# Patient Record
Sex: FEMALE | Race: WHITE | NOT HISPANIC OR LATINO | Employment: OTHER | URBAN - METROPOLITAN AREA
[De-identification: names, ages, dates, MRNs, and addresses within clinical notes are randomized per-mention and may not be internally consistent; named-entity substitution may affect disease eponyms.]

---

## 2017-07-10 ENCOUNTER — ALLSCRIPTS OFFICE VISIT (OUTPATIENT)
Dept: OTHER | Facility: OTHER | Age: 82
End: 2017-07-10

## 2017-09-27 ENCOUNTER — GENERIC CONVERSION - ENCOUNTER (OUTPATIENT)
Dept: OTHER | Facility: OTHER | Age: 82
End: 2017-09-27

## 2017-11-20 ENCOUNTER — GENERIC CONVERSION - ENCOUNTER (OUTPATIENT)
Dept: OTHER | Facility: OTHER | Age: 82
End: 2017-11-20

## 2018-01-12 NOTE — MISCELLANEOUS
Message  will change meds to atenolol 25 mg daily and amlodipine 2 5 mg daily  she called with bp 180/90 and not tolerating ziac      Plan  Atrial fibrillation, Benign essential hypertension    · AmLODIPine Besylate 2 5 MG Oral Tablet; TAKE 1 TABLET DAILY  Benign essential hypertension    · Atenolol 25 MG Oral Tablet; TAKE 1 TABLET DAILY    Signatures   Electronically signed by : Last Torres DO;  Apr 17 2016 11:13AM EST                       (Author)

## 2018-01-13 VITALS
HEIGHT: 64 IN | HEART RATE: 64 BPM | RESPIRATION RATE: 16 BRPM | DIASTOLIC BLOOD PRESSURE: 70 MMHG | SYSTOLIC BLOOD PRESSURE: 122 MMHG | WEIGHT: 126.38 LBS | BODY MASS INDEX: 21.57 KG/M2

## 2018-01-13 NOTE — MISCELLANEOUS
Message   Recorded as Task   Date: 01/13/2016 03:33 PM, Created By: Benita Holden   Task Name: Medical Complaint Callback   Assigned To: Paolo Castelan   Regarding Patient: Mima Rosas, Status: Complete   CommentBolivar Monmouth - 13 Jan 2016 3:33 PM     TASK CREATED  Caller: Self; [phone] (Home)  PC from patient stating that she was getting headaches and not feeling well, so she decided to take her BP, which she had not done in quite some time  Her BP was 190/99, which upset her, and she called an ambulance, which took her to [facility] ER  She states they found nothing and released her at 3:00 am   This was on Sunday evening  On Monday she went to PCP/Krystian Coe, and he increased her Atneolol from 12 5 mgs to 25 mgs qd  She has taken it for two days now, and BP came down to 175/85, but she feels this is still unsatifactory  She "does not care for him as a doctor" and wanted to contact you for advisement  She has only had the increase in Atenolol for 3 days, but is taking her BP very often and becoming upset  She is due back with you in April for her yearly appointment  I told her that once she has the 25 mgs in her system for a few more days, it may come down further  Her symptoms have already decreased significantly  Any suggestions or changes on your part? Fareed Valle - 13 Jan 2016 3:42 PM     TASK EDITED  phone call to pt   ML   rec taking extra 12 5 if sbp remains above 575 systolic   should call if problems persist   Fareed Valle - 13 Jan 2016 3:42 PM     TASK COMPLETED   PC from patient stating her BP has returned to normal again  She thanks us for such prompt and good care  Active Problems    1  Atrial fibrillation (427 31) (I48 91)   2  Benign essential hypertension (401 1) (I10)   3  Mitral regurgitation (424 0) (I34 0)   4  Osteoporosis (733 00) (M81 0)   5  TIA (transient ischemic attack) (435 9) (G45 9)    Current Meds   1   Atenolol 25 MG Oral Tablet; one tablet daily as directed; Therapy: 21Apr2015 to (Evaluate:19Oct2016)  Requested for: 25Oct2015; Last   Rx:25Oct2015 Ordered   2  Ecotrin 325 MG Oral Tablet Delayed Release; Take 1 tablet twice daily Recorded   3  Multiple Vitamin Oral Tablet; TAKE 1 TABLET DAILY; Therapy: 21Apr2015 to Recorded   4  Multiple Vitamin Oral Tablet; TAKE 1 TABLET DAILY; Therapy: 21Apr2015 to Recorded    Allergies    1  Iodinated Contrast Media    2  IV Dye   3   Shellfish    Signatures   Electronically signed by : Rocio Shirley, ; Jan 14 2016  1:57PM EST                       (Administrative)

## 2018-01-22 RX ORDER — AMLODIPINE BESYLATE 2.5 MG/1
1 TABLET ORAL 2 TIMES DAILY
COMMUNITY
Start: 2016-04-17 | End: 2018-06-16 | Stop reason: SDUPTHER

## 2018-01-22 RX ORDER — ONDANSETRON 4 MG/1
TABLET, FILM COATED ORAL EVERY 8 HOURS PRN
COMMUNITY
Start: 2017-07-10 | End: 2020-08-17

## 2018-01-22 RX ORDER — HYDROMORPHONE HYDROCHLORIDE 2 MG/1
2 TABLET ORAL EVERY 6 HOURS PRN
COMMUNITY
Start: 2017-07-10 | End: 2021-08-21

## 2018-01-22 RX ORDER — ATENOLOL 25 MG/1
1 TABLET ORAL DAILY
COMMUNITY
Start: 2015-04-21 | End: 2018-03-20 | Stop reason: SDUPTHER

## 2018-01-22 RX ORDER — MULTIVITAMIN WITH IRON
1 TABLET ORAL DAILY
COMMUNITY
Start: 2015-04-21

## 2018-01-24 ENCOUNTER — OFFICE VISIT (OUTPATIENT)
Dept: CARDIOLOGY CLINIC | Facility: CLINIC | Age: 83
End: 2018-01-24
Payer: MEDICARE

## 2018-01-24 VITALS
HEART RATE: 80 BPM | WEIGHT: 133.8 LBS | BODY MASS INDEX: 22.97 KG/M2 | RESPIRATION RATE: 20 BRPM | DIASTOLIC BLOOD PRESSURE: 80 MMHG | SYSTOLIC BLOOD PRESSURE: 130 MMHG

## 2018-01-24 DIAGNOSIS — I34.0 MITRAL VALVE INSUFFICIENCY, ACQUIRED: ICD-10-CM

## 2018-01-24 DIAGNOSIS — I48.91 ATRIAL FIBRILLATION, UNSPECIFIED TYPE (HCC): Primary | ICD-10-CM

## 2018-01-24 DIAGNOSIS — I10 BENIGN ESSENTIAL HYPERTENSION: ICD-10-CM

## 2018-01-24 PROCEDURE — 93000 ELECTROCARDIOGRAM COMPLETE: CPT | Performed by: INTERNAL MEDICINE

## 2018-01-24 PROCEDURE — 99213 OFFICE O/P EST LOW 20 MIN: CPT | Performed by: INTERNAL MEDICINE

## 2018-01-24 NOTE — PROGRESS NOTES
History:  28-year-old with mild to moderate mitral insufficiency, suspected embolic stroke for which she has refused systemic anticoagulation, hypertension and atrial fibrillation  Since her last visit, she denies chest pain, shortness of breath, palpitations, edema or lightheadedness  She did suffer a fall which was non syncopal    Past medical history:  History of hernia repair    Allergies:  Adhesive tape, morphine derivatives, iodine contrast media    Medications:  Amlodipine 2 5 mg b i d  Atenolol 25 mg daily  Dilaudid  Echo trend 325 mg daily  Multivitamins  Zofran    Family history: Mother had myocardial infarction and  at age 67    Social history:  Patient is a former smoker  Drinks alcohol rarely    ROS:  Remarkable for frequent urination and difficulty sleeping no recent TIAs or claudication    Physical exam:  Blood pressure 130/80  Pulse 80  General:  Pleasant elderly female in no acute distress  Eyes:  Sclera anicteric  Conjunctiva pink  ENT:  Oropharynx clear  Mucous membranes without erythema or exudate  Neck:  Supple without JVD or thyromegaly  Lungs:  Clear without wheezing rhonchi rales  Heart:  Regular with grade 2 apical systolic murmur  No diastolic murmur rub or gallop  Abdomen:  Nontender without mass organomegaly    A/P:  1  Atrial fibrillation-paroxysmal   Currently in sinus rhythm  Has refused warfarin or novel anticoagulants  Continue full-dose aspirin  Currently in normal sinus rhythm and not complaining of recent palpitations  2  Mild to moderate mitral insufficiency  Not problematic  Continue observant  3  Benign essential hypertension-well controlled with atenolol and amlodipine    Continue same    Follow-up in 6 months time    Shelby Sanchez MD

## 2018-03-20 DIAGNOSIS — I48.0 PAF (PAROXYSMAL ATRIAL FIBRILLATION) (HCC): Primary | ICD-10-CM

## 2018-03-20 RX ORDER — ATENOLOL 25 MG/1
TABLET ORAL
Qty: 90 TABLET | Refills: 1 | Status: SHIPPED | OUTPATIENT
Start: 2018-03-20 | End: 2018-11-05 | Stop reason: SDUPTHER

## 2018-06-16 DIAGNOSIS — I10 ESSENTIAL HYPERTENSION: Primary | ICD-10-CM

## 2018-06-17 RX ORDER — AMLODIPINE BESYLATE 2.5 MG/1
TABLET ORAL
Qty: 180 TABLET | Refills: 2 | Status: SHIPPED | OUTPATIENT
Start: 2018-06-17 | End: 2019-01-28 | Stop reason: SDUPTHER

## 2018-07-31 ENCOUNTER — OFFICE VISIT (OUTPATIENT)
Dept: CARDIOLOGY CLINIC | Facility: CLINIC | Age: 83
End: 2018-07-31
Payer: MEDICARE

## 2018-07-31 VITALS
BODY MASS INDEX: 23.29 KG/M2 | RESPIRATION RATE: 16 BRPM | HEART RATE: 68 BPM | DIASTOLIC BLOOD PRESSURE: 76 MMHG | SYSTOLIC BLOOD PRESSURE: 128 MMHG | HEIGHT: 64 IN | WEIGHT: 136.4 LBS

## 2018-07-31 DIAGNOSIS — I10 BENIGN ESSENTIAL HYPERTENSION: ICD-10-CM

## 2018-07-31 DIAGNOSIS — I48.0 PAROXYSMAL ATRIAL FIBRILLATION (HCC): Primary | ICD-10-CM

## 2018-07-31 DIAGNOSIS — I34.0 MITRAL VALVE INSUFFICIENCY, ACQUIRED: ICD-10-CM

## 2018-07-31 PROCEDURE — 99213 OFFICE O/P EST LOW 20 MIN: CPT | Performed by: INTERNAL MEDICINE

## 2018-07-31 NOTE — PROGRESS NOTES
Cardiology Follow Up    Gonzalez Joy  7/20/1932  [id number]  [facility] [address] [phone]    Reason for visit: 6 month FU for PAF, HTN and Mild to moderate MR    1  Paroxysmal atrial fibrillation (HCC)     2  Benign essential hypertension     3  Mitral valve insufficiency, acquired         Interval History: Since her last visit dhe denies CP or SOB  She denies palpitations or edema  He denies lightheadedness  Patient Active Problem List   Diagnosis    Paroxysmal atrial fibrillation (HCC)    Benign essential hypertension    Mitral valve insufficiency, acquired    Osteoporosis    TIA (transient ischemic attack)     No past medical history on file  Social History     Social History    Marital status: Single     Spouse name: N/A    Number of children: N/A    Years of education: N/A     Occupational History    Not on file  Social History Main Topics    Smoking status: Former Smoker    Smokeless tobacco: Never Used    Alcohol use Not on file    Drug use: No    Sexual activity: Not on file     Other Topics Concern    Not on file     Social History Narrative    No narrative on file      Family History   Problem Relation Age of Onset    Heart disease Mother      No past surgical history on file      Current Outpatient Prescriptions:     amLODIPine (NORVASC) 2 5 mg tablet, TAKE ONE TABLET BY MOUTH TWICE DAILY , Disp: 180 tablet, Rfl: 2    aspirin (ECOTRIN) 325 mg EC tablet, Take 1 tablet by mouth 2 (two) times a day, Disp: , Rfl:     atenolol (TENORMIN) 25 mg tablet, TAKE ONE TABLET BY MOUTH EVERY DAY , Disp: 90 tablet, Rfl: 1    HYDROmorphone (DILAUDID) 2 mg tablet, Take 1 tablet by mouth daily, Disp: , Rfl:     Multiple Vitamins tablet, Take 1 tablet by mouth daily, Disp: , Rfl:     ondansetron (ZOFRAN) 4 mg tablet, Take by mouth, Disp: , Rfl:   Allergies   Allergen Reactions    Iodine Shortness Of Breath    Buprenorphine GI Intolerance    Hydrocodone-Acetaminophen      Vomiting    Iodinated Diagnostic Agents     Morphine     Naproxen GI Intolerance    Shellfish Allergy     Other Other (See Comments)     red raised areas - please use paper tape    Shellfish-Derived Products Rash         Review of Systems:  Review of Systems   Constitutional: Negative for fatigue  Respiratory: Negative for shortness of breath  Cardiovascular: Negative for chest pain, palpitations and leg swelling  Gastrointestinal: Negative for constipation and diarrhea  Genitourinary: Negative for frequency  Musculoskeletal: Positive for arthralgias and back pain  Neurological: Negative for dizziness and speech difficulty  Physical Exam:  Vitals:    07/31/18 0917   BP: 128/76   Pulse: 68   Resp: 16   Weight: 61 9 kg (136 lb 6 4 oz)   Height: 5' 3 5" (1 613 m)     Physical Exam   Constitutional: She appears well-developed and well-nourished  No distress  HENT:   Head: Normocephalic and atraumatic  Mouth/Throat: No oropharyngeal exudate  Eyes: Conjunctivae are normal  No scleral icterus  Neck: Neck supple  Normal carotid pulses and no JVD present  Carotid bruit is not present  No thyromegaly present  Cardiovascular: Normal rate and regular rhythm  Exam reveals no gallop and no friction rub  Murmur heard  Systolic (apical location) murmur is present with a grade of 2/6   Pulmonary/Chest: Breath sounds normal  She has no wheezes  She has no rhonchi  She has no rales  Abdominal: Soft  She exhibits no mass  There is no hepatosplenomegaly  There is no tenderness  Musculoskeletal: She exhibits no edema  Discussion/Summary:  1  PAF-In NSR today  Refuses warfarin or NOAC despite a hx of CVA  Takes full dose ASA  Continue atenolol for potential rate control and BP  2  HTN-w/c on atenolol and amlodipine  Continue same  3  Mild to moderate MR   Should not be problematic going forward      FU 6 months      Toni Mcbride MD

## 2018-11-05 DIAGNOSIS — I48.0 PAF (PAROXYSMAL ATRIAL FIBRILLATION) (HCC): ICD-10-CM

## 2018-11-05 DIAGNOSIS — I10 ESSENTIAL HYPERTENSION: ICD-10-CM

## 2018-11-05 RX ORDER — ATENOLOL 25 MG/1
25 TABLET ORAL DAILY
Qty: 90 TABLET | Refills: 3 | Status: SHIPPED | OUTPATIENT
Start: 2018-11-05 | End: 2019-10-28 | Stop reason: SDUPTHER

## 2018-11-30 ENCOUNTER — APPOINTMENT (EMERGENCY)
Dept: CT IMAGING | Facility: HOSPITAL | Age: 83
DRG: 543 | End: 2018-11-30
Payer: MEDICARE

## 2018-11-30 ENCOUNTER — HOSPITAL ENCOUNTER (INPATIENT)
Facility: HOSPITAL | Age: 83
LOS: 2 days | Discharge: HOME HEALTH | DRG: 543 | End: 2018-12-03
Attending: EMERGENCY MEDICINE | Admitting: FAMILY MEDICINE
Payer: MEDICARE

## 2018-11-30 DIAGNOSIS — M54.9 BACK PAIN: Primary | ICD-10-CM

## 2018-11-30 DIAGNOSIS — S32.010A COMPRESSION FRACTURE OF L1 LUMBAR VERTEBRA (HCC): ICD-10-CM

## 2018-11-30 DIAGNOSIS — S32.020A COMPRESSION FRACTURE OF L2 LUMBAR VERTEBRA (HCC): ICD-10-CM

## 2018-11-30 PROBLEM — E87.1 HYPONATREMIA: Status: ACTIVE | Noted: 2018-11-30

## 2018-11-30 LAB
ALBUMIN SERPL BCP-MCNC: 3.1 G/DL (ref 3.5–5)
ALP SERPL-CCNC: 86 U/L (ref 46–116)
ALT SERPL W P-5'-P-CCNC: 25 U/L (ref 12–78)
ANION GAP SERPL CALCULATED.3IONS-SCNC: 9 MMOL/L (ref 4–13)
AST SERPL W P-5'-P-CCNC: 22 U/L (ref 5–45)
BASOPHILS # BLD AUTO: 0.02 THOUSANDS/ΜL (ref 0–0.1)
BASOPHILS NFR BLD AUTO: 0 % (ref 0–1)
BILIRUB SERPL-MCNC: 0.81 MG/DL (ref 0.2–1)
BUN SERPL-MCNC: 15 MG/DL (ref 5–25)
CALCIUM SERPL-MCNC: 8.5 MG/DL (ref 8.3–10.1)
CHLORIDE SERPL-SCNC: 96 MMOL/L (ref 100–108)
CO2 SERPL-SCNC: 26 MMOL/L (ref 21–32)
CREAT SERPL-MCNC: 0.85 MG/DL (ref 0.6–1.3)
EOSINOPHIL # BLD AUTO: 0.21 THOUSAND/ΜL (ref 0–0.61)
EOSINOPHIL NFR BLD AUTO: 3 % (ref 0–6)
ERYTHROCYTE [DISTWIDTH] IN BLOOD BY AUTOMATED COUNT: 13.3 % (ref 11.6–15.1)
GFR SERPL CREATININE-BSD FRML MDRD: 62 ML/MIN/1.73SQ M
GLUCOSE SERPL-MCNC: 111 MG/DL (ref 65–140)
HCT VFR BLD AUTO: 39.8 % (ref 34.8–46.1)
HGB BLD-MCNC: 13.4 G/DL (ref 11.5–15.4)
IMM GRANULOCYTES # BLD AUTO: 0.03 THOUSAND/UL (ref 0–0.2)
IMM GRANULOCYTES NFR BLD AUTO: 0 % (ref 0–2)
LYMPHOCYTES # BLD AUTO: 0.85 THOUSANDS/ΜL (ref 0.6–4.47)
LYMPHOCYTES NFR BLD AUTO: 11 % (ref 14–44)
MCH RBC QN AUTO: 31.7 PG (ref 26.8–34.3)
MCHC RBC AUTO-ENTMCNC: 33.7 G/DL (ref 31.4–37.4)
MCV RBC AUTO: 94 FL (ref 82–98)
MONOCYTES # BLD AUTO: 0.53 THOUSAND/ΜL (ref 0.17–1.22)
MONOCYTES NFR BLD AUTO: 7 % (ref 4–12)
NEUTROPHILS # BLD AUTO: 6.15 THOUSANDS/ΜL (ref 1.85–7.62)
NEUTS SEG NFR BLD AUTO: 79 % (ref 43–75)
NRBC BLD AUTO-RTO: 0 /100 WBCS
PLATELET # BLD AUTO: 182 THOUSANDS/UL (ref 149–390)
PMV BLD AUTO: 8.4 FL (ref 8.9–12.7)
POTASSIUM SERPL-SCNC: 4.2 MMOL/L (ref 3.5–5.3)
PROT SERPL-MCNC: 7.1 G/DL (ref 6.4–8.2)
RBC # BLD AUTO: 4.23 MILLION/UL (ref 3.81–5.12)
SODIUM SERPL-SCNC: 131 MMOL/L (ref 136–145)
WBC # BLD AUTO: 7.79 THOUSAND/UL (ref 4.31–10.16)

## 2018-11-30 PROCEDURE — 99285 EMERGENCY DEPT VISIT HI MDM: CPT

## 2018-11-30 PROCEDURE — 80053 COMPREHEN METABOLIC PANEL: CPT | Performed by: PHYSICIAN ASSISTANT

## 2018-11-30 PROCEDURE — 85025 COMPLETE CBC W/AUTO DIFF WBC: CPT | Performed by: PHYSICIAN ASSISTANT

## 2018-11-30 PROCEDURE — 72131 CT LUMBAR SPINE W/O DYE: CPT

## 2018-11-30 PROCEDURE — 96375 TX/PRO/DX INJ NEW DRUG ADDON: CPT

## 2018-11-30 PROCEDURE — 96374 THER/PROPH/DIAG INJ IV PUSH: CPT

## 2018-11-30 PROCEDURE — 72128 CT CHEST SPINE W/O DYE: CPT

## 2018-11-30 PROCEDURE — 99222 1ST HOSP IP/OBS MODERATE 55: CPT | Performed by: FAMILY MEDICINE

## 2018-11-30 PROCEDURE — 36415 COLL VENOUS BLD VENIPUNCTURE: CPT | Performed by: PHYSICIAN ASSISTANT

## 2018-11-30 RX ORDER — ATENOLOL 50 MG/1
25 TABLET ORAL DAILY
Status: DISCONTINUED | OUTPATIENT
Start: 2018-12-01 | End: 2018-12-03 | Stop reason: HOSPADM

## 2018-11-30 RX ORDER — ASPIRIN 325 MG
325 TABLET, DELAYED RELEASE (ENTERIC COATED) ORAL DAILY
Status: DISCONTINUED | OUTPATIENT
Start: 2018-12-01 | End: 2018-12-03 | Stop reason: HOSPADM

## 2018-11-30 RX ORDER — HYDROMORPHONE HCL/PF 1 MG/ML
0.5 SYRINGE (ML) INJECTION ONCE
Status: DISCONTINUED | OUTPATIENT
Start: 2018-11-30 | End: 2018-11-30

## 2018-11-30 RX ORDER — ONDANSETRON 2 MG/ML
4 INJECTION INTRAMUSCULAR; INTRAVENOUS EVERY 6 HOURS PRN
Status: DISCONTINUED | OUTPATIENT
Start: 2018-11-30 | End: 2018-12-03 | Stop reason: HOSPADM

## 2018-11-30 RX ORDER — ASPIRIN 325 MG
325 TABLET, DELAYED RELEASE (ENTERIC COATED) ORAL 2 TIMES DAILY
Status: DISCONTINUED | OUTPATIENT
Start: 2018-11-30 | End: 2018-11-30

## 2018-11-30 RX ORDER — SODIUM CHLORIDE 9 MG/ML
75 INJECTION, SOLUTION INTRAVENOUS CONTINUOUS
Status: DISCONTINUED | OUTPATIENT
Start: 2018-11-30 | End: 2018-12-02

## 2018-11-30 RX ORDER — ONDANSETRON 2 MG/ML
4 INJECTION INTRAMUSCULAR; INTRAVENOUS ONCE
Status: COMPLETED | OUTPATIENT
Start: 2018-11-30 | End: 2018-11-30

## 2018-11-30 RX ORDER — HYDROMORPHONE HYDROCHLORIDE 2 MG/1
2 TABLET ORAL DAILY
Status: DISCONTINUED | OUTPATIENT
Start: 2018-12-01 | End: 2018-12-03 | Stop reason: HOSPADM

## 2018-11-30 RX ORDER — ACETAMINOPHEN 325 MG/1
650 TABLET ORAL EVERY 6 HOURS PRN
Status: DISCONTINUED | OUTPATIENT
Start: 2018-11-30 | End: 2018-11-30

## 2018-11-30 RX ORDER — HYDROMORPHONE HCL/PF 1 MG/ML
0.2 SYRINGE (ML) INJECTION EVERY 4 HOURS PRN
Status: DISCONTINUED | OUTPATIENT
Start: 2018-11-30 | End: 2018-12-03 | Stop reason: HOSPADM

## 2018-11-30 RX ORDER — HEPARIN SODIUM 5000 [USP'U]/ML
5000 INJECTION, SOLUTION INTRAVENOUS; SUBCUTANEOUS EVERY 12 HOURS SCHEDULED
Status: DISCONTINUED | OUTPATIENT
Start: 2018-11-30 | End: 2018-12-03 | Stop reason: HOSPADM

## 2018-11-30 RX ORDER — HYDROMORPHONE HCL/PF 1 MG/ML
0.2 SYRINGE (ML) INJECTION ONCE
Status: COMPLETED | OUTPATIENT
Start: 2018-11-30 | End: 2018-11-30

## 2018-11-30 RX ORDER — AMLODIPINE BESYLATE 2.5 MG/1
2.5 TABLET ORAL 2 TIMES DAILY
Status: DISCONTINUED | OUTPATIENT
Start: 2018-11-30 | End: 2018-12-03 | Stop reason: HOSPADM

## 2018-11-30 RX ADMIN — ONDANSETRON 4 MG: 2 INJECTION INTRAMUSCULAR; INTRAVENOUS at 13:35

## 2018-11-30 RX ADMIN — AMLODIPINE BESYLATE 2.5 MG: 2.5 TABLET ORAL at 21:08

## 2018-11-30 RX ADMIN — SODIUM CHLORIDE 75 ML/HR: 0.9 INJECTION, SOLUTION INTRAVENOUS at 18:45

## 2018-11-30 RX ADMIN — HYDROMORPHONE HYDROCHLORIDE 0.2 MG: 1 INJECTION, SOLUTION INTRAMUSCULAR; INTRAVENOUS; SUBCUTANEOUS at 13:37

## 2018-11-30 RX ADMIN — HYDROMORPHONE HYDROCHLORIDE 0.2 MG: 1 INJECTION, SOLUTION INTRAMUSCULAR; INTRAVENOUS; SUBCUTANEOUS at 21:07

## 2018-11-30 NOTE — TREATMENT PLAN
Treatment plan   11/30/2018    Bernarda Billingsley, aged 80    Date of birth 7/20/1932     Neurosurgery remote evaluation and recommendations    Spoke with Dr Ligia Yousif at [facility] on 11/30/2018    This 26-year-old woman living in [place] apparently is complaining more and more of /with increasing low back back pain since early November 2018  History of longstanding chronic back    PMH hypertension  Osteopenia DJD    On ASA 81 mg a day     She lives in independent living facility    She is longstanding back pain which is position  More comfortable and less pain if lying flat two pillows not weight-bearing    By report denies any radicular or abnormal long tract signs  And has got good use and power in upper and lower extremities    No incontinence    She is severe age-related osteopenia with deconditioning and thoracolumbar kyphosis    She has a history of multiple compression fractures of the thoracolumbar spine    She had kyphoplasties of L3, L4-L5 about two years ago    Also has compression fracture of T12 with mild retropulsion    She sees chronic pain management  Apparently she takes 2 mg of Dilaudid daily    Imaging reviewed by me; CT thoracic and lumbar spine from 11/30/2018    Striking osteopenia  Preserved thoracolumbar alignment without subluxation with mild lower thoracolumbar kyphosis and levoscoliosis at the T11-L1 level moderate    Severe compression fracture of T12 which is been previously treated with cement  Overall there is  80% loss of height- essentially a vertebra plana    No severe compression impression fracture of L1 vertebral body with about 90% loss of height in the central mid body region anteriorly superiorly  Thinner remnant of bone anteriorly and slightly wider remnant posteriorly      No effective central marrow trabeculae that could be treated with transpedicular cement    Mild to moderate canal stenosis at T12  Prior cement kyphoplasty T12    There is mild compression fracture of the vertebral body of L2 with loss of height 20% with sclerosis    The L3 vertebral body is relatively well maintained in height    The L4 and L5 vertebral bodies show moderate longstanding compression fractures with previous cement treatment    Some cement is migrated into the disc space between the L4 and L5 vertebral bodies    In the thoracic spine their mind mild chronic compression deformities of T8 and T6 superior endplate    Overall no acute fracture lucencies identified and posterior elements appear intact without particular fractures    Mild grade 1 spondylolisthesis L5 on S1    There is no indication for transfer    Would not consider any neurosurgical intervention    The likely more recent fracture of L1 not be suitable for kyphoplasty    Recommend TLSO brace this is to be worn when she is sitting up greater than 45° when out of bed  Should have upright thoracolumbar x-rays AP and lateral x-rays either sitting or preferably standing once the brace is fitted    She may benefit for this for up to two months in terms of alleviation of pain and preventing forward subluxation and increase in her kyphosis at the thoracolumbar junction    Recommend be seen by pain management  + consider multi modality pain control regime for now    11/30/2018 4:57 PM    Isaias Byrne MD, Attending Neurological Surgeon

## 2018-11-30 NOTE — H&P
H&P- Robie Jeans 7/20/1932, 80 y o  female MRN: [id number]    Unit/Bed#: E5 -01 Encounter: [id number]    Primary Care Provider: Adryan Nj   Date and time admitted to hospital: 11/30/2018 11:28 AM        * Closed compression fracture of L1 lumbar vertebra [facility])   Assessment & Plan    Keep patient on strict bed rest   Consult PT/OT for evaluation  Consult orthopedic surgery to be fitted for back brace  Continue pain control with Dilaudid  Subcutaneous heparin 5000 units q 12 hours for DVT prophylaxis  Benign essential hypertension   Assessment & Plan    Continue atenolol 25 mg daily and atenolol 2 5 mg b i d  Blood pressure is well controlled     Hyponatremia   Assessment & Plan    Was likely hypovolemic hyponatremia due to dehydration  Continue normal saline at 75 mL/hour  VTE Prophylaxis: Heparin  / reason for no mechanical VTE prophylaxis Patient refused   Code Status:  DNR/DNI  POLST: reason for no mechanical VTE prophylaxis Patient refused  Discussion with family:  No    Anticipated Length of Stay:  Patient will be admitted on an Observation basis with an anticipated length of stay of  greater than 2 midnights  Justification for Hospital Stay:  Severe L1 compression fracture    Total Time for Visit, including Counseling / Coordination of Care: 20 minutes  Greater than 50% of this total time spent on direct patient counseling and coordination of care  Chief Complaint:   Severe back pain    History of Present Illness:    Robie Jeans is a 80 y o  female who presents with severe low back pain that started early this month due to an L1 compression fracture  The patient has had kyphoplasties of L3, L4, L5 and fracture of T12 approximately 2 years ago  For chronic pain she takes 2 mg of Dilaudid 3 to 4 times a day    She also follows up as an outpatient with pain management specialist   She currently lives in an independent living facility and has severe pain even when she goes to ambulate or even at rest   She denies any numbness or tingling or radiation of the pain down her legs, she denies any bowel or bladder incontinence or saddle anesthesia  She denies any fevers or chills  If the as CT of thoracic and lumbar spine showed multiple chronic compression fractures of the thoracic and lumbar spine including severe previously treated fractures of T12, L4 on L5  There is also evidence of a severe L1 compression fracture and mild L2 compression fracture  The case was discuss with the neurosurgery team from the ED physician, and recommended no surgical intervention but rather admission for back brace fitting and PT/OT evaluation  The patient currently states that she has severe back pain but no other complaints  Review of Systems:    Review of Systems   Constitutional: Negative  HENT: Negative  Eyes: Negative  Respiratory: Negative  Cardiovascular: Negative  Gastrointestinal: Negative  Endocrine: Negative  Genitourinary: Negative  Musculoskeletal: Positive for back pain  Skin: Negative  Neurological: Negative  Past Medical and Surgical History:     Past Medical History:   Diagnosis Date    Hyperlipidemia     Hypertension     L1 vertebral fracture (HCC)        Past Surgical History:   Procedure Laterality Date    BACK SURGERY         Meds/Allergies:    Prior to Admission medications    Medication Sig Start Date End Date Taking?  Authorizing Provider   amLODIPine (NORVASC) 2 5 mg tablet TAKE ONE TABLET BY MOUTH TWICE DAILY  6/17/18  Yes Candy Angela MD   aspirin (ECOTRIN) 325 mg EC tablet Take 1 tablet by mouth 2 (two) times a day   Yes Historical Provider, MD   atenolol (TENORMIN) 25 mg tablet Take 1 tablet (25 mg total) by mouth daily 11/5/18  Yes Candy Angela MD   HYDROmorphone (DILAUDID) 2 mg tablet Take 1 tablet by mouth daily 7/10/17  Yes Historical Provider, MD   ondansetron (ZOFRAN) 4 mg tablet Take by mouth 7/10/17  Yes Historical Provider, MD   Multiple Vitamins tablet Take 1 tablet by mouth daily 4/21/15   Historical Provider, MD     I have reviewed home medications using allscripts  Allergies: Allergies   Allergen Reactions    Iodine Shortness Of Breath    Buprenorphine GI Intolerance    Hydrocodone-Acetaminophen      Vomiting    Iodinated Diagnostic Agents     Morphine     Naproxen GI Intolerance    Shellfish Allergy     Other Other (See Comments)     red raised areas - please use paper tape    Shellfish-Derived Products Rash       Social History:     Marital Status: Single   Occupation:  None  Patient Pre-hospital Living Situation:  Assisted living  Patient Pre-hospital Level of Mobility:  None ambulatory  Patient Pre-hospital Diet Restrictions:  None  Substance Use History:   History   Alcohol Use No     History   Smoking Status    Former Smoker   Smokeless Tobacco    Never Used     History   Drug Use No       Family History:    Family History   Problem Relation Age of Onset    Heart disease Mother        Physical Exam:     Vitals:   Blood Pressure: 135/75 (11/30/18 1845)  Pulse: 79 (11/30/18 1845)  Temperature: 99 5 °F (37 5 °C) (11/30/18 1845)  Temp Source: Temporal (11/30/18 1845)  Respirations: 18 (11/30/18 1845)  Weight - Scale: 62 kg (136 lb 11 oz) (11/30/18 1135)  SpO2: 93 % (11/30/18 1845)    Physical Exam   Constitutional: She is oriented to person, place, and time  She appears well-developed and well-nourished  HENT:   Head: Normocephalic and atraumatic  Eyes: Pupils are equal, round, and reactive to light  Cardiovascular: Normal rate, regular rhythm and normal heart sounds  Pulmonary/Chest: Effort normal and breath sounds normal  No respiratory distress  She has no wheezes  Abdominal: Soft  Bowel sounds are normal    Musculoskeletal:   Severe back pain with tenderness to palpation in the lumbar region area   Neurological: She is alert and oriented to person, place, and time     Skin: Skin is warm and dry  Additional Data:     Lab Results: I have personally reviewed pertinent reports  Results from last 7 days  Lab Units 11/30/18  1247   WBC Thousand/uL 7 79   HEMOGLOBIN g/dL 13 4   HEMATOCRIT % 39 8   PLATELETS Thousands/uL 182   NEUTROS PCT % 79*   LYMPHS PCT % 11*   MONOS PCT % 7   EOS PCT % 3       Results from last 7 days  Lab Units 11/30/18  1247   SODIUM mmol/L 131*   POTASSIUM mmol/L 4 2   CHLORIDE mmol/L 96*   CO2 mmol/L 26   BUN mg/dL 15   CREATININE mg/dL 0 85   ANION GAP mmol/L 9   CALCIUM mg/dL 8 5   ALBUMIN g/dL 3 1*   TOTAL BILIRUBIN mg/dL 0 81   ALK PHOS U/L 86   ALT U/L 25   AST U/L 22   GLUCOSE RANDOM mg/dL 111                       Imaging: I have personally reviewed pertinent reports  CT spine thoracic & lumbar wo contrast   Final Result by Comfort Fleming DO (11/30 1342)      Diffuse osteopenia  Multiple chronic compression fractures of the thoracic and lumbar spine including severe, previously treated fractures of T12, L4 and L5  There is a severe L1 compression fracture and a mild L2 compression fracture  No acute    fracture lucencies are identified  A mild acute to subacute component cannot be excluded  Resulting mild canal stenosis and foraminal narrowing without evidence of discrete nerve impingement  Hypodensities within the right kidney described in detail above  Within the upper pole there is a hypodensity with a punctate calcification along its wall  Since there are no prior studies for comparison, this can be further evaluated with renal    ultrasound on a nonemergent basis  The study was marked in [facility] for immediate notification  Workstation performed: [id number]             EKG, Pathology, and Other Studies Reviewed on Admission:   · EKG:  CT of thoracic and lumbar spine  Allscripts / Epic Records Reviewed: Yes     ** Please Note: This note has been constructed using a voice recognition system   **

## 2018-11-30 NOTE — ED PROVIDER NOTES
History  Chief Complaint   Patient presents with    Back Pain     back pain, hx L1 fracture  pain worsening since Tuesday evening  taking pain meds as prescribed, dilaudid last taken at 0830 this morning  states no relief  This is an 80-year old female patient who being of November started with low back pain and was found to have an L1 compression fracture which she has had pain ever since  She has had kyphoplasties of L3, L4, L5, approximately 2 years ago  She also has a fracture of T12  She has chronic pain in takes 2 mg of Dilaudid daily  She does see chronic pain management  She is independent leave living and currently has severe pain when she goes ambulate but even at rest she has discomfort  Does not radiate no numbness or tingling no change in bowel or bladder or saddle anesthesia  No fever no chills no headache no blurred vision or double vision no cough congestion sore throat nausea vomiting diarrhea abdominal pain no chest pain or shortness of breath  No urgency frequency or dysuria  Patient moves her feet without difficulty  At this time patient will have a CT of her thoracic and lumbar spine a high probability of being admitted due to being a fall risk with a compression fracture  Prior to Admission Medications   Prescriptions Last Dose Informant Patient Reported? Taking?    HYDROmorphone (DILAUDID) 2 mg tablet  Self Yes Yes   Sig: Take 1 tablet by mouth daily   Multiple Vitamins tablet  Self Yes No   Sig: Take 1 tablet by mouth daily   amLODIPine (NORVASC) 2 5 mg tablet  Self No Yes   Sig: TAKE ONE TABLET BY MOUTH TWICE DAILY    aspirin (ECOTRIN) 325 mg EC tablet  Self Yes Yes   Sig: Take 1 tablet by mouth 2 (two) times a day   atenolol (TENORMIN) 25 mg tablet   No Yes   Sig: Take 1 tablet (25 mg total) by mouth daily   ondansetron (ZOFRAN) 4 mg tablet  Self Yes Yes   Sig: Take by mouth      Facility-Administered Medications: None       Past Medical History:   Diagnosis Date    Hyperlipidemia     Hypertension     L1 vertebral fracture (HCC)        Past Surgical History:   Procedure Laterality Date    BACK SURGERY         Family History   Problem Relation Age of Onset    Heart disease Mother      I have reviewed and agree with the history as documented  Social History   Substance Use Topics    Smoking status: Former Smoker    Smokeless tobacco: Never Used    Alcohol use No        Review of Systems   All other systems reviewed and are negative  Physical Exam  Physical Exam   Constitutional: She appears well-developed and well-nourished  HENT:   Head: Normocephalic and atraumatic  Right Ear: External ear normal    Left Ear: External ear normal    Nose: Nose normal    Mouth/Throat: Oropharynx is clear and moist    Eyes: Pupils are equal, round, and reactive to light  Conjunctivae are normal    Neck: Normal range of motion  Neck supple  Cardiovascular: Normal rate and regular rhythm  Pulmonary/Chest: Effort normal and breath sounds normal    Abdominal: Soft  Bowel sounds are normal  There is no tenderness  Musculoskeletal:        Back:    Neurological: She is alert  Skin: Skin is warm  Psychiatric: She has a normal mood and affect  Her behavior is normal    Nursing note and vitals reviewed        Vital Signs  ED Triage Vitals   Temperature Pulse Respirations Blood Pressure SpO2   11/30/18 1135 11/30/18 1135 11/30/18 1135 11/30/18 1135 11/30/18 1135   98 6 °F (37 °C) 80 20 130/71 95 %      Temp src Heart Rate Source Patient Position - Orthostatic VS BP Location FiO2 (%)   -- -- 11/30/18 1508 11/30/18 1508 --     Lying Right arm       Pain Score       11/30/18 1135       7           Vitals:    11/30/18 1135 11/30/18 1242 11/30/18 1508   BP: 130/71 133/72 134/68   Pulse: 80 79 80   Patient Position - Orthostatic VS:   Lying       Visual Acuity      ED Medications  Medications   ondansetron (ZOFRAN) injection 4 mg (4 mg Intravenous Given 11/30/18 1335) HYDROmorphone (DILAUDID) injection 0 2 mg (0 2 mg Intravenous Given 11/30/18 7527)       Diagnostic Studies  Results Reviewed     Procedure Component Value Units Date/Time    Comprehensive metabolic panel [[id number]]  (Abnormal) Collected:  11/30/18 1247    Lab Status:  Final result Specimen:  Blood from Arm, Left Updated:  11/30/18 1321     Sodium 131 (L) mmol/L      Potassium 4 2 mmol/L      Chloride 96 (L) mmol/L      CO2 26 mmol/L      ANION GAP 9 mmol/L      BUN 15 mg/dL      Creatinine 0 85 mg/dL      Glucose 111 mg/dL      Calcium 8 5 mg/dL      AST 22 U/L      ALT 25 U/L      Alkaline Phosphatase 86 U/L      Total Protein 7 1 g/dL      Albumin 3 1 (L) g/dL      Total Bilirubin 0 81 mg/dL      eGFR 62 ml/min/1 73sq m     Narrative:         National Kidney Disease Education Program recommendations are as follows:  GFR calculation is accurate only with a steady state creatinine  Chronic Kidney disease less than 60 ml/min/1 73 sq  meters  Kidney failure less than 15 ml/min/1 73 sq  meters      CBC and differential [[id number]]  (Abnormal) Collected:  11/30/18 1247    Lab Status:  Final result Specimen:  Blood from Arm, Left Updated:  11/30/18 1252     WBC 7 79 Thousand/uL      RBC 4 23 Million/uL      Hemoglobin 13 4 g/dL      Hematocrit 39 8 %      MCV 94 fL      MCH 31 7 pg      MCHC 33 7 g/dL      RDW 13 3 %      MPV 8 4 (L) fL      Platelets 588 Thousands/uL      nRBC 0 /100 WBCs      Neutrophils Relative 79 (H) %      Immat GRANS % 0 %      Lymphocytes Relative 11 (L) %      Monocytes Relative 7 %      Eosinophils Relative 3 %      Basophils Relative 0 %      Neutrophils Absolute 6 15 Thousands/µL      Immature Grans Absolute 0 03 Thousand/uL      Lymphocytes Absolute 0 85 Thousands/µL      Monocytes Absolute 0 53 Thousand/µL      Eosinophils Absolute 0 21 Thousand/µL      Basophils Absolute 0 02 Thousands/µL                  CT spine thoracic & lumbar wo contrast   Final Result by Jam Melgoza DO (11/30 1342)      Diffuse osteopenia  Multiple chronic compression fractures of the thoracic and lumbar spine including severe, previously treated fractures of T12, L4 and L5  There is a severe L1 compression fracture and a mild L2 compression fracture  No acute    fracture lucencies are identified  A mild acute to subacute component cannot be excluded  Resulting mild canal stenosis and foraminal narrowing without evidence of discrete nerve impingement  Hypodensities within the right kidney described in detail above  Within the upper pole there is a hypodensity with a punctate calcification along its wall  Since there are no prior studies for comparison, this can be further evaluated with renal    ultrasound on a nonemergent basis  The study was marked in [facility] for immediate notification  Workstation performed: [id number]                    Procedures  Procedures       Phone Contacts  ED Phone Contact    ED Course  ED Course as of Nov 30 1532 Fri Nov 30, 2018   1351 Spoke with Dr Chico Moreno paralysis explained case in detail he would like to consult Neurosurgery to see if intervention is necessary and the patient needs to go to [facility] where she might be able to be better served  200 Spoke with neurosurgeon will review films and call back    1525 Spoke with Dr Ashlee Park Neurosurgery he reviewed all films and states she is not a candidate for kyphoplasty nor surgery    She should be kept that [place] and fitted for  brace                                MDM  CritCare Time    Disposition  Final diagnoses:   Back pain   Compression fracture of L1 lumbar vertebra (HCC)   Compression fracture of L2 lumbar vertebra ([address] )     Time reflects when diagnosis was documented in both MDM as applicable and the Disposition within this note     Time User Action Codes Description Comment    11/30/2018  3:27 PM Carissa Bobby Add [M54 9] Back pain     11/30/2018  3:27 PM [place], Miles Donato [[id number]] Compression fracture of L1 lumbar vertebra ([address] )     11/30/2018  3:28 PM [address] [[id number]] Compression fracture of L2 lumbar vertebra [facility])       ED Disposition     ED Disposition Condition Comment    Admit  Case was discussed with dr Stas Sinha and the patient's admission status was agreed to be Admission Status: observation status to the service of Dr Josh Peters   Follow-up Information    None         Patient's Medications   Discharge Prescriptions    No medications on file     No discharge procedures on file      ED Provider  Electronically Signed by           Gurmeet Santo PA-C  11/30/18 7334

## 2018-11-30 NOTE — ED NOTES
RN called floor to clarify if pt room ready/clean   Per Yara, room is clean-OK to bring pt to room at this time     Kimberly Jimenez RN  11/30/18 7021

## 2018-12-01 LAB
ANION GAP SERPL CALCULATED.3IONS-SCNC: 8 MMOL/L (ref 4–13)
BASOPHILS # BLD AUTO: 0.02 THOUSANDS/ΜL (ref 0–0.1)
BASOPHILS NFR BLD AUTO: 0 % (ref 0–1)
BUN SERPL-MCNC: 11 MG/DL (ref 5–25)
CALCIUM SERPL-MCNC: 8.3 MG/DL (ref 8.3–10.1)
CHLORIDE SERPL-SCNC: 99 MMOL/L (ref 100–108)
CO2 SERPL-SCNC: 24 MMOL/L (ref 21–32)
CREAT SERPL-MCNC: 0.72 MG/DL (ref 0.6–1.3)
EOSINOPHIL # BLD AUTO: 0.32 THOUSAND/ΜL (ref 0–0.61)
EOSINOPHIL NFR BLD AUTO: 5 % (ref 0–6)
ERYTHROCYTE [DISTWIDTH] IN BLOOD BY AUTOMATED COUNT: 13.2 % (ref 11.6–15.1)
GFR SERPL CREATININE-BSD FRML MDRD: 76 ML/MIN/1.73SQ M
GLUCOSE SERPL-MCNC: 107 MG/DL (ref 65–140)
HCT VFR BLD AUTO: 36.2 % (ref 34.8–46.1)
HGB BLD-MCNC: 12.2 G/DL (ref 11.5–15.4)
IMM GRANULOCYTES # BLD AUTO: 0.03 THOUSAND/UL (ref 0–0.2)
IMM GRANULOCYTES NFR BLD AUTO: 1 % (ref 0–2)
LYMPHOCYTES # BLD AUTO: 0.94 THOUSANDS/ΜL (ref 0.6–4.47)
LYMPHOCYTES NFR BLD AUTO: 14 % (ref 14–44)
MCH RBC QN AUTO: 31.9 PG (ref 26.8–34.3)
MCHC RBC AUTO-ENTMCNC: 33.7 G/DL (ref 31.4–37.4)
MCV RBC AUTO: 95 FL (ref 82–98)
MONOCYTES # BLD AUTO: 0.49 THOUSAND/ΜL (ref 0.17–1.22)
MONOCYTES NFR BLD AUTO: 7 % (ref 4–12)
NEUTROPHILS # BLD AUTO: 4.86 THOUSANDS/ΜL (ref 1.85–7.62)
NEUTS SEG NFR BLD AUTO: 73 % (ref 43–75)
NRBC BLD AUTO-RTO: 0 /100 WBCS
PLATELET # BLD AUTO: 182 THOUSANDS/UL (ref 149–390)
PMV BLD AUTO: 8.9 FL (ref 8.9–12.7)
POTASSIUM SERPL-SCNC: 3.8 MMOL/L (ref 3.5–5.3)
RBC # BLD AUTO: 3.82 MILLION/UL (ref 3.81–5.12)
SODIUM SERPL-SCNC: 131 MMOL/L (ref 136–145)
WBC # BLD AUTO: 6.66 THOUSAND/UL (ref 4.31–10.16)

## 2018-12-01 PROCEDURE — 85025 COMPLETE CBC W/AUTO DIFF WBC: CPT | Performed by: FAMILY MEDICINE

## 2018-12-01 PROCEDURE — 80048 BASIC METABOLIC PNL TOTAL CA: CPT | Performed by: FAMILY MEDICINE

## 2018-12-01 PROCEDURE — 99232 SBSQ HOSP IP/OBS MODERATE 35: CPT | Performed by: FAMILY MEDICINE

## 2018-12-01 PROCEDURE — 99222 1ST HOSP IP/OBS MODERATE 55: CPT | Performed by: PHYSICIAN ASSISTANT

## 2018-12-01 RX ADMIN — SODIUM CHLORIDE 75 ML/HR: 0.9 INJECTION, SOLUTION INTRAVENOUS at 21:01

## 2018-12-01 RX ADMIN — ONDANSETRON 4 MG: 2 INJECTION INTRAMUSCULAR; INTRAVENOUS at 08:16

## 2018-12-01 RX ADMIN — HYDROMORPHONE HYDROCHLORIDE 0.2 MG: 1 INJECTION, SOLUTION INTRAMUSCULAR; INTRAVENOUS; SUBCUTANEOUS at 16:25

## 2018-12-01 RX ADMIN — AMLODIPINE BESYLATE 2.5 MG: 2.5 TABLET ORAL at 08:16

## 2018-12-01 RX ADMIN — HYDROMORPHONE HYDROCHLORIDE 2 MG: 2 TABLET ORAL at 08:16

## 2018-12-01 RX ADMIN — HYDROMORPHONE HYDROCHLORIDE 0.2 MG: 1 INJECTION, SOLUTION INTRAMUSCULAR; INTRAVENOUS; SUBCUTANEOUS at 21:43

## 2018-12-01 RX ADMIN — ASPIRIN 325 MG: 325 TABLET, COATED ORAL at 08:16

## 2018-12-01 RX ADMIN — AMLODIPINE BESYLATE 2.5 MG: 2.5 TABLET ORAL at 16:25

## 2018-12-01 RX ADMIN — ATENOLOL 25 MG: 50 TABLET ORAL at 08:16

## 2018-12-01 NOTE — PROGRESS NOTES
Progress Note - Irais Thomas 1932, 80 y o  female MRN: [id number]    Unit/Bed#: E5 -01 Encounter: [id number]    Primary Care Provider: Oksana Wilkins   Date and time admitted to hospital: 2018 11:28 AM        * Closed compression fracture of L1 lumbar vertebra [facility])   Assessment & Plan    Consult PT/OT for evaluation  Orthopedic surgery consulted and patient will be fitted with a TLSO brace  Orthopedic surgery recommends weight-bearing as tolerated  Continue pain control with Dilaudid  Subcutaneous heparin 5000 units q 12 hours for DVT prophylaxis  Benign essential hypertension   Assessment & Plan    Continue atenolol 25 mg daily and atenolol 2 5 mg b i d  Blood pressure is well controlled     Hyponatremia   Assessment & Plan    Was likely hypovolemic hyponatremia due to dehydration  Continue normal saline at 75 mL/hour  VTE Pharmacologic Prophylaxis:   Pharmacologic: Heparin  Mechanical VTE Prophylaxis in Place: No    Patient Centered Rounds: I have performed bedside rounds with nursing staff today  Discussions with Specialists or Other Care Team Provider:  Yes    Education and Discussions with Family / Patient:  Yes    Time Spent for Care: 15 minutes  More than 50% of total time spent on counseling and coordination of care as described above  Current Length of Stay: 0 day(s)    Current Patient Status: Observation   Certification Statement: The patient will continue to require additional inpatient hospital stay due to Requires PT/OT evaluation    Discharge Plan:   To be determined    Code Status: Level 3 - DNAR and DNI      Subjective:   Patient still complaining of lower back pain    Objective:     Vitals:   Temp (24hrs), Av 9 °F (37 2 °C), Min:98 °F (36 7 °C), Max:99 5 °F (37 5 °C)    Temp:  [98 °F (36 7 °C)-99 5 °F (37 5 °C)] 98 °F (36 7 °C)  HR:  [75-89] 77  Resp:  [17-20] 20  BP: (113-144)/(66-89) 113/73  SpO2:  [90 %-95 %] 93 %  Body mass index is 23 83 kg/m²  Input and Output Summary (last 24 hours):     No intake or output data in the 24 hours ending 12/01/18 1638    Physical Exam:     Physical Exam   Constitutional: She is oriented to person, place, and time  She appears well-developed  No distress  Frail, elderly female patient   HENT:   Head: Normocephalic and atraumatic  Eyes: Pupils are equal, round, and reactive to light  EOM are normal    Neck: Normal range of motion  Neck supple  Cardiovascular: Normal rate, regular rhythm and normal heart sounds  Pulmonary/Chest: Effort normal and breath sounds normal    Abdominal: Soft  Bowel sounds are normal  She exhibits no distension  There is no tenderness  Musculoskeletal: She exhibits tenderness  She exhibits no edema  Neurological: She is alert and oriented to person, place, and time  Skin: Skin is warm and dry  She is not diaphoretic  Additional Data:     Labs:      Results from last 7 days  Lab Units 12/01/18  0440   WBC Thousand/uL 6 66   HEMOGLOBIN g/dL 12 2   HEMATOCRIT % 36 2   PLATELETS Thousands/uL 182   NEUTROS PCT % 73   LYMPHS PCT % 14   MONOS PCT % 7   EOS PCT % 5       Results from last 7 days  Lab Units 12/01/18  0440 11/30/18  1247   SODIUM mmol/L 131* 131*   POTASSIUM mmol/L 3 8 4 2   CHLORIDE mmol/L 99* 96*   CO2 mmol/L 24 26   BUN mg/dL 11 15   CREATININE mg/dL 0 72 0 85   ANION GAP mmol/L 8 9   CALCIUM mg/dL 8 3 8 5   ALBUMIN g/dL  --  3 1*   TOTAL BILIRUBIN mg/dL  --  0 81   ALK PHOS U/L  --  86   ALT U/L  --  25   AST U/L  --  22   GLUCOSE RANDOM mg/dL 107 111                           * I Have Reviewed All Lab Data Listed Above  * Additional Pertinent Lab Tests Reviewed:  Stephanie [facility] Admission Reviewed    Imaging:    Imaging Reports Reviewed Today Include:  None available  Imaging Personally Reviewed by Myself Includes:  None    Recent Cultures (last 7 days):           Last 24 Hours Medication List:     Current Facility-Administered Medications:  amLODIPine 2 5 mg Oral BID Loree Lang MD    aspirin 325 mg Oral Daily Loree Lang MD    atenolol 25 mg Oral Daily Horace Porter MD    heparin (porcine) 5,000 Units Subcutaneous Q12H [address] Horace Porter MD    HYDROmorphone 0 2 mg Intravenous Q4H PRN Chana Schmitt PA-C    HYDROmorphone 2 mg Oral Daily Horace Porter MD    ondansetron 4 mg Intravenous Q6H PRN Loree Lang MD    sodium chloride 75 mL/hr Intravenous Continuous Loree Lang MD Last Rate: 75 mL/hr (11/30/18 1845)        Today, Patient Was Seen By: Loree Lang MD    ** Please Note: Dictation voice to text software may have been used in the creation of this document   **

## 2018-12-01 NOTE — CONSULTS
Orthopedics   Irais Thomas 80 y o  female MRN: [id number]  Unit/Bed#: E5 -01      Chief Complaint:   Back Pain    HPI:   80 y  o female complaining of Lumbar back pain  She has a history of L1 vertebral compression fracture and stated that her pain has been progressively increasing since Tuesday  She presented to the ED yesterday who admitted her to be fitted for a TLSO brace today  Patient admits to a history of diffuse osteopenia and osteoporosis  She is status post kyphoplasty for L3-5 performed about 2 years ago by Dr Maricarmen Miramontes of  [facility] pain [facility]  Denies lower extremity numbness, weakness, bowel/bladder incontinence  Review Of Systems:   · Skin: Normal  · Neuro: See HPI  · Musculoskeletal: See HPI  · 14 point review of systems negative except as stated above     Past Medical History:   Past Medical History:   Diagnosis Date    Hyperlipidemia     Hypertension     L1 vertebral fracture ([address] )        Past Surgical History:   Past Surgical History:   Procedure Laterality Date    BACK SURGERY         Family History:  Family history reviewed and non-contributory  Family History   Problem Relation Age of Onset    Heart disease Mother        Social History:  Social History     Social History    Marital status: Single     Spouse name: N/A    Number of children: N/A    Years of education: N/A     Social History Main Topics    Smoking status: Former Smoker    Smokeless tobacco: Never Used    Alcohol use No    Drug use: No    Sexual activity: Not Asked     Other Topics Concern    None     Social History Narrative    None       Allergies:    Allergies   Allergen Reactions    Iodine Shortness Of Breath    Buprenorphine GI Intolerance and Other (See Comments)    Hydrocodone-Acetaminophen      Vomiting    Hydrocodone-Acetaminophen      Vomiting  Vomiting    Iodinated Diagnostic Agents     Metrizamide     Morphine     Naproxen GI Intolerance    Shellfish Allergy     Glucosamine Rash    Other Other (See Comments)     red raised areas - please use paper tape    Shellfish-Derived Products Rash           Labs:    0  Lab Value Date/Time   HCT 36 2 12/01/2018 0440   HCT 39 8 11/30/2018 1247   HGB 12 2 12/01/2018 0440   HGB 13 4 11/30/2018 1247   WBC 6 66 12/01/2018 0440   WBC 7 79 11/30/2018 1247       Meds:    Current Facility-Administered Medications:     amLODIPine (NORVASC) tablet 2 5 mg, 2 5 mg, Oral, BID, Arturo Casarez MD, 2 5 mg at 11/30/18 2108    aspirin (ECOTRIN) EC tablet 325 mg, 325 mg, Oral, Daily, Arturo Casarez MD    atenolol (TENORMIN) tablet 25 mg, 25 mg, Oral, Daily, Arturo Casarez MD    heparin (porcine) subcutaneous injection 5,000 Units, 5,000 Units, Subcutaneous, Q12H [facility] **AND** Platelet count, , , Once, Arturo Casarez MD    HYDROmorphone (DILAUDID) injection 0 2 mg, 0 2 mg, Intravenous, Q4H PRN, Chana Schmitt PA-C, 0 2 mg at 11/30/18 2107    HYDROmorphone (DILAUDID) tablet 2 mg, 2 mg, Oral, Daily, Arturo Casarez MD    ondansetron (ZOFRAN) injection 4 mg, 4 mg, Intravenous, Q6H PRN, Arturo Casarez MD    sodium chloride 0 9 % infusion, 75 mL/hr, Intravenous, Continuous, Horace Porter MD, Last Rate: 75 mL/hr at 11/30/18 1845, 75 mL/hr at 11/30/18 1845    Blood Culture:   No results found for: BLOODCX    Wound Culture:   No results found for: WOUNDCULT    Ins and Outs:  No intake/output data recorded  Physical Exam:   /89 (BP Location: Right arm)   Pulse 82   Temp 99 2 °F (37 3 °C) (Temporal)   Resp 19   Wt 62 kg (136 lb 11 oz)   SpO2 90%   BMI 23 83 kg/m²   Gen: Alert and oriented to person, place, time  HEENT: EOMI, eyes clear, moist mucus membranes, hearing intact  Respiratory: Bilateral chest rise   No audible wheezing found  Cardiovascular: Regular Rate and Rhythm  Abdomen: soft nontender/nondistended  Musculoskeletal: BACK  · Skin intact, no open lesions  · Tenderness to palpation over Thoracic and Lumbar spine  · 5/5 strength with hip flexion/extension/abduction, Knee Flexion/extension, ankle dorsi/plantar flexion, EHL/FHL bilateral lower extremities  · Sensation intact L1-S1 bilateral lower extremities  · negative Straight leg raise  · 2+ deep tendon reflexes noted at patella tendon, achilles tendon bilateral lower extremities  · Normal rectal tone without gross blood  · Normal perianal sensation    Radiology:   I personally reviewed the films  CT  Thoracic and Lumbar spine shows diffuse osteopenia, compression fractures including severe fx of L1 and mild fx of S2  Assessment:  80 y  o female complaining of  Thoracic and Lumbar back pain    With multiple compression fractures and history of compression fractures and kyphoplasty of L3-5    Plan:   · TLSO brace ordered  · PT OT eval and treat  · Weight bear as tolerated  · Medical management pain control per primary team  · Dispo: Ortho signing off      Leonides Patel PA-C

## 2018-12-01 NOTE — ASSESSMENT & PLAN NOTE
Consult PT/OT for evaluation  Orthopedic surgery consulted and patient will be fitted with a TLSO brace  Orthopedic surgery recommends weight-bearing as tolerated  Continue pain control with Dilaudid  Subcutaneous heparin 5000 units q 12 hours for DVT prophylaxis

## 2018-12-01 NOTE — UTILIZATION REVIEW
Initial Clinical Review    Admission: Date/Time/Statement:   OBS  ORDER    11/30  @    1532     Orders Placed This Encounter   Procedures    Place in Observation (expected length of stay for this patient is less than two midnights)     Standing Status:   Standing     Number of Occurrences:   1     Order Specific Question:   Admitting Physician     Answer:   Francisco Mckeon     Order Specific Question:   Level of Care     Answer:   Med Surg [16]         ED: Date/Time/Mode of Arrival:   ED Arrival Information     Expected Arrival Acuity Means of Arrival Escorted By Service Admission Type    - 11/30/2018 11:28 Urgent Ambulance [facility] Medicine Urgent    Arrival Complaint    Back Pain          Chief Complaint:   Chief Complaint   Patient presents with    Back Pain     back pain, hx L1 fracture  pain worsening since Tuesday evening  taking pain meds as prescribed, dilaudid last taken at 0830 this morning  states no relief  History of Illness: Yudith Valadez is a 80 y o  female who presents with severe low back pain that started early this month due to an L1 compression fracture  The patient has had kyphoplasties of L3, L4, L5 and fracture of T12 approximately 2 years ago  For chronic pain she takes 2 mg of Dilaudid 3 to 4 times a day  She also follows up as an outpatient with pain management specialist   She currently lives in an independent living facility and has severe pain even when she goes to ambulate or even at rest   She denies any numbness or tingling or radiation of the pain down her legs, she denies any bowel or bladder incontinence or saddle anesthesia  She denies any fevers or chills  If the as CT of thoracic and lumbar spine showed multiple chronic compression fractures of the thoracic and lumbar spine including severe previously treated fractures of T12, L4 on L5  There is also evidence of a severe L1 compression fracture and mild L2 compression fracture  The case was discuss with the neurosurgery team from the ED physician, and recommended no surgical intervention but rather admission for back brace fitting and PT/OT evaluation  The patient currently states that she has severe back pain but no other complaints        ED Vital Signs:   ED Triage Vitals   Temperature Pulse Respirations Blood Pressure SpO2   11/30/18 1135 11/30/18 1135 11/30/18 1135 11/30/18 1135 11/30/18 1135   98 6 °F (37 °C) 80 20 130/71 95 %      Temp Source Heart Rate Source Patient Position - Orthostatic VS BP Location FiO2 (%)   11/30/18 1845 11/30/18 2342 11/30/18 1508 11/30/18 1508 --   Temporal Monitor Lying Right arm       Pain Score       11/30/18 1135       7        Wt Readings from Last 1 Encounters:   11/30/18 62 kg (136 lb 11 oz)       Vital Signs (abnormal):   above    Abnormal Labs/Diagnostic Test Results:   NA  131  Albumin   3 1  Ct  T/spine/L/S  Spine:    Diffuse osteopenia   Multiple chronic compression fractures of the thoracic and lumbar spine including severe, previously treated fractures of T12, L4 and L5   There is a severe L1 compression fracture and a mild L2 compression fracture   No acute   fracture lucencies are identified   A mild acute to subacute component cannot be excluded   Resulting mild canal stenosis and foraminal narrowing without evidence of discrete nerve impingement  Hypodensities within the right kidney described in detail above   Within the upper pole there is a hypodensity with a punctate calcification along its wall   Since there are no prior studies for comparison, this can be further evaluated with renal   ultrasound on a nonemergent basis        ED Treatment:   Medication Administration from 11/30/2018 1128 to 11/30/2018 1819       Date/Time Order Dose Route Action Action by Comments     11/30/2018 1335 ondansetron (ZOFRAN) injection 4 mg 4 mg Intravenous Given Aj Ritchie RN      11/30/2018 1337 HYDROmorphone (DILAUDID) injection 0 2 mg 0 2 mg Intravenous Given Kal Sweeney RN           Past Medical/Surgical History: Active Ambulatory Problems     Diagnosis Date Noted    Paroxysmal atrial fibrillation ([facility] [address] ) 01/07/2013    Benign essential hypertension 04/28/2014    Mitral valve insufficiency, acquired 04/29/2014    Osteoporosis 04/28/2014    TIA (transient ischemic attack) 04/21/2015     Resolved Ambulatory Problems     Diagnosis Date Noted    No Resolved Ambulatory Problems     Past Medical History:   Diagnosis Date    Hyperlipidemia     Hypertension     L1 vertebral fracture (HCC)        Admitting Diagnosis: Back pain [M54 9]  Compression fracture of L2 lumbar vertebra ([facility] [address] ) [S32 020A]  Compression fracture of L1 lumbar vertebra ([facility] [address] ) [S32 010A]    Age/Sex: 80 y o  female    Assessment/Plan:   Closed compression fracture of L1 lumbar vertebra (HCC)   Assessment & Plan     Keep patient on strict bed rest   Consult PT/OT for evaluation  Consult orthopedic surgery to be fitted for back brace  Continue pain control with Dilaudid  Subcutaneous heparin 5000 units q 12 hours for DVT prophylaxis       Benign essential hypertension   Assessment & Plan     Continue atenolol 25 mg daily and atenolol 2 5 mg b i d  Blood pressure is well controlled      Hyponatremia   Assessment & Plan     Was likely hypovolemic hyponatremia due to dehydration  Continue normal saline at 75 mL/hour  Anticipated Length of Stay:  Patient will be admitted on an Observation basis with an anticipated length of stay of  greater than 2 midnights     Justification for Hospital Stay:  Severe L1 compression fracture    Admission Orders:  OBS  ORDER    11/30  @    1532  Scheduled Meds:   Current Facility-Administered Medications:  amLODIPine 2 5 mg Oral BID Tigist Silva MD    aspirin 325 mg Oral Daily Horace Porter MD    atenolol 25 mg Oral Daily Horace Porter MD    heparin (porcine) 5,000 Units Subcutaneous Q12H [address] Tigist Silva MD    HYDROmorphone 0 2 mg Intravenous Q4H PRN Chana Schmitt PA-C    HYDROmorphone 2 mg Oral Daily Horace Porter MD    ondansetron 4 mg Intravenous Q6H PRN Navid Cox MD    sodium chloride 75 mL/hr Intravenous Continuous Navid Cox MD Last Rate: 75 mL/hr (11/30/18 1845)     Continuous Infusions:   sodium chloride 75 mL/hr Last Rate: 75 mL/hr (11/30/18 1845)     PRN Meds: HYDROmorphone    ondansetron     Reg diet  Cons ortho  PT/OT  Spine brace  IV  zofran PRN ( x1  12/1  Thus far)  IV  Dilaudid  Prn ( x1  11/30)    Per  Ortho Consult:  80 y  o female complaining of  Thoracic and Lumbar back pain    With multiple compression fractures and history of compression fractures and kyphoplasty of L3-5     Plan:   · TLSO brace ordered  · PT OT eval and treat  · Weight bear as tolerated

## 2018-12-02 LAB
ANION GAP SERPL CALCULATED.3IONS-SCNC: 10 MMOL/L (ref 4–13)
BUN SERPL-MCNC: 12 MG/DL (ref 5–25)
CALCIUM SERPL-MCNC: 8 MG/DL (ref 8.3–10.1)
CHLORIDE SERPL-SCNC: 103 MMOL/L (ref 100–108)
CO2 SERPL-SCNC: 22 MMOL/L (ref 21–32)
CREAT SERPL-MCNC: 0.71 MG/DL (ref 0.6–1.3)
GFR SERPL CREATININE-BSD FRML MDRD: 77 ML/MIN/1.73SQ M
GLUCOSE SERPL-MCNC: 110 MG/DL (ref 65–140)
POTASSIUM SERPL-SCNC: 3.8 MMOL/L (ref 3.5–5.3)
SODIUM SERPL-SCNC: 135 MMOL/L (ref 136–145)

## 2018-12-02 PROCEDURE — G8979 MOBILITY GOAL STATUS: HCPCS

## 2018-12-02 PROCEDURE — G8987 SELF CARE CURRENT STATUS: HCPCS

## 2018-12-02 PROCEDURE — 99232 SBSQ HOSP IP/OBS MODERATE 35: CPT | Performed by: FAMILY MEDICINE

## 2018-12-02 PROCEDURE — 97163 PT EVAL HIGH COMPLEX 45 MIN: CPT

## 2018-12-02 PROCEDURE — G8978 MOBILITY CURRENT STATUS: HCPCS

## 2018-12-02 PROCEDURE — 80048 BASIC METABOLIC PNL TOTAL CA: CPT | Performed by: FAMILY MEDICINE

## 2018-12-02 PROCEDURE — G8988 SELF CARE GOAL STATUS: HCPCS

## 2018-12-02 PROCEDURE — 97166 OT EVAL MOD COMPLEX 45 MIN: CPT

## 2018-12-02 RX ADMIN — HYDROMORPHONE HYDROCHLORIDE 0.2 MG: 1 INJECTION, SOLUTION INTRAMUSCULAR; INTRAVENOUS; SUBCUTANEOUS at 04:42

## 2018-12-02 RX ADMIN — HYDROMORPHONE HYDROCHLORIDE 0.2 MG: 1 INJECTION, SOLUTION INTRAMUSCULAR; INTRAVENOUS; SUBCUTANEOUS at 17:14

## 2018-12-02 RX ADMIN — HYDROMORPHONE HYDROCHLORIDE 0.2 MG: 1 INJECTION, SOLUTION INTRAMUSCULAR; INTRAVENOUS; SUBCUTANEOUS at 22:34

## 2018-12-02 RX ADMIN — ATENOLOL 25 MG: 50 TABLET ORAL at 09:08

## 2018-12-02 RX ADMIN — HYDROMORPHONE HYDROCHLORIDE 2 MG: 2 TABLET ORAL at 09:08

## 2018-12-02 RX ADMIN — ASPIRIN 325 MG: 325 TABLET, COATED ORAL at 09:08

## 2018-12-02 RX ADMIN — AMLODIPINE BESYLATE 2.5 MG: 2.5 TABLET ORAL at 09:08

## 2018-12-02 NOTE — PLAN OF CARE
Problem: OCCUPATIONAL THERAPY ADULT  Goal: Performs self-care activities at highest level of function for planned discharge setting  See evaluation for individualized goals  Treatment Interventions: ADL retraining, Functional transfer training, UE strengthening/ROM, Endurance training, Patient/family training, Equipment evaluation/education, Compensatory technique education, Energy conservation, Activityengagement          See flowsheet documentation for full assessment, interventions and recommendations  Limitation: Decreased ADL status, Decreased Safe judgement during ADL, Decreased UE strength, Decreased endurance, Decreased self-care trans, Decreased high-level ADLs  Prognosis: Good  Assessment: Pt is 79 y/o female seen for OT evaluation admit [facility] on 11/30/18 with increased back pain, pt w/ severe L1 compression fracture, mild L2 compression fracture  Pt with TLSO brace and spinal precautions for session  Comorbidities include: previous L3-L5 fracture s/p kyphoplasty, HTN, hyponatremia, osteoporosis  Personal factors include pt is resident at [place] [id number]  Prior to arrival pt independent with ADLS, IADLS, functional transfers and mobility with intermittent use of SPC  Upon evaluation, pt with setup self-feeding, supervision grooming, supervision UB ADLS, MOD assist LB ADLS, MIN assist toileting, MIN assist sit<>stand functional transfers, supervision functional mobility with RW  Pt presents with the following deficits impacting occupational performance: increased pain, decreased strength, increased fatigue, decreased activity tolerance (FAIR), decreased balance, spinal precautions with TLSO brace, decreased endurance  These deficits functionally impact participation with [employer], functional transfers and mobility, leisure participation   Occupational performance areas to address include: dressing, grooming, bathing, toileting, light meal prep, functional transfers and mobility with RW, pt education on home safety and transfer safety  Pt educated on spinal precautions and techniques, pt provided with packet, pt receptive  Pt provided with bedside commode over toilet  Pt seated bedside chair at end of session with call bell within reach and all needs met  Pt reports no concerns with returning to [address] [place] [address]  From an OT standpoint, recommendation at time of d/c is home with HOME OT       OT Discharge Recommendation: Home OT  OT - OK to Discharge:  (home with HOME OT when medically stable)      Comments: MARIELOS LOVE, OTS

## 2018-12-02 NOTE — ASSESSMENT & PLAN NOTE
PT/OT evaluated patient-and recommended home PT  Orthopedic surgery consulted and patient will be fitted with a TLSO brace  Orthopedic surgery recommends weight-bearing as tolerated  Continue pain control with Dilaudid  Subcutaneous heparin 5000 units q 12 hours for DVT prophylaxis  Patient will be discharged to home tomorrow

## 2018-12-02 NOTE — ASSESSMENT & PLAN NOTE
Hyponatremia improved to sodium of 135  Was likely hypovolemic hyponatremia due to dehydration  Can discontinue IV fluids

## 2018-12-02 NOTE — PLAN OF CARE
Problem: PHYSICAL THERAPY ADULT  Goal: Performs mobility at highest level of function for planned discharge setting  See evaluation for individualized goals  Treatment/Interventions: Functional transfer training, LE strengthening/ROM, Elevations, Therapeutic exercise, Endurance training, Patient/family training, Equipment eval/education, Bed mobility, Gait training, Spoke to nursing, OT  Equipment Recommended: [place] ([place])       See flowsheet documentation for full assessment, interventions and recommendations  Prognosis: Fair  Problem List: Decreased strength, Decreased endurance, Impaired balance, Decreased mobility, Pain  Assessment: Pt is 80 y o  female seen for PT evaluation s/p admit to [facility] on 11/30/2018  Two pt identifiers were used to confirm  Pt presented w/ increased low back pain  Pt was admitted with a primary dx of: closed compression fx of L1  Pt WBAT with TLSO as per Ortho  PT now consulted for assessment of mobility and d/c needs  Pt with Activity as tolerated orders  Pts current co morbidities effecting treatment include: HLD, HTN, L 1 vertebral fx, hx of samaria surgery   Pts current clinical presentation is Unstable/ Unpredictable (high complexity) due to Ongoing medical management for primary dx, Increased reliance on more restrictive AD compared to baseline, Decreased activity tolerance compared to baseline, Fall risk, Increased assistance needed from caregiver at current time, Spinal precautions at current time    Prior to admission, pt was I with ambulation with the use of a SPC as per pt  Upon evaluation, pt currently is requiring; min A x1 for transfers and S for ambulation w/ RW   Pt denies any lightheadedness or dizziness with ambulation  Pt presents at PT eval functioning below baseline and currently w/ overall mobility deficits 2* to: BLE weakness, impaired balance, decreased endurance, gait deviations, pain, orthopedic restrictions   Pt currently at a fall risk 2* to impairments listed above  Based on the aforementioned PT evaluation, pt will continue to benefit from skilled Acute PT interventions to address stated impairments; to maximize functional mobility; for ongoing pt/ family training; and DME needs  At conclusion of PT session pt returned back in chair with phone and call bell within reach  Pt denies any further questions at this time  PT is currently recommending home PT  Pt/ family agreeable to plan and goals as stated on evaluation  PT will continue to follow during hospital stay  Barriers to Discharge: None     Recommendation: Home PT     PT - OK to Discharge: Yes (when medically cleared )    See flowsheet documentation for full assessment

## 2018-12-02 NOTE — PROGRESS NOTES
Progress Note - Pantera Ozuna 1932, 80 y o  female MRN: [id number]    Unit/Bed#: E5 -01 Encounter: [id number]    Primary Care Provider: John Pantoja   Date and time admitted to hospital: 2018 11:28 AM        * Closed compression fracture of L1 lumbar vertebra [facility])   Assessment & Plan    PT/OT evaluated patient-and recommended home PT  Orthopedic surgery consulted and patient will be fitted with a TLSO brace  Orthopedic surgery recommends weight-bearing as tolerated  Continue pain control with Dilaudid  Subcutaneous heparin 5000 units q 12 hours for DVT prophylaxis  Patient will be discharged to home tomorrow  Benign essential hypertension   Assessment & Plan    Continue atenolol 25 mg daily and atenolol 2 5 mg b i d  Blood pressure is well controlled     Hyponatremia   Assessment & Plan    Hyponatremia improved to sodium of 135  Was likely hypovolemic hyponatremia due to dehydration  Can discontinue IV fluids  VTE Pharmacologic Prophylaxis:   Pharmacologic: Heparin  Mechanical VTE Prophylaxis in Place: No    Patient Centered Rounds: I have performed bedside rounds with nursing staff today  Discussions with Specialists or Other Care Team Provider:  No    Education and Discussions with Family / Patient:  Yes    Time Spent for Care: 20 minutes  More than 50% of total time spent on counseling and coordination of care as described above  Current Length of Stay: 1 day(s)    Current Patient Status: Inpatient   Certification Statement: The patient will continue to require additional inpatient hospital stay due to For discharge tomorrow    Discharge Plan:   To home tomorrow with home PT    Code Status: Level 3 - DNAR and DNI      Subjective:   Patient states that her back pain is much better    Objective:     Vitals:   Temp (24hrs), Av 2 °F (36 8 °C), Min:97 3 °F (36 3 °C), Max:99 2 °F (37 3 °C)    Temp:  [97 3 °F (36 3 °C)-99 2 °F (37 3 °C)] 98 2 °F (36 8 °C)  HR: [] 88  Resp:  [18-19] 18  BP: (100-120)/(61-68) 100/61  SpO2:  [90 %-94 %] 90 %  Body mass index is 23 83 kg/m²  Input and Output Summary (last 24 hours): Intake/Output Summary (Last 24 hours) at 12/02/18 1648  Last data filed at 12/01/18 2100   Gross per 24 hour   Intake          2088 75 ml   Output                0 ml   Net          2088 75 ml       Physical Exam:     Physical Exam   Constitutional: She is oriented to person, place, and time  She appears well-developed and well-nourished  No distress  HENT:   Head: Normocephalic and atraumatic  Eyes: Pupils are equal, round, and reactive to light  EOM are normal    Neck: Normal range of motion  Neck supple  Cardiovascular: Normal rate, regular rhythm and normal heart sounds  Pulmonary/Chest: Effort normal and breath sounds normal    Abdominal: Soft  Bowel sounds are normal    Musculoskeletal: She exhibits tenderness  Lumbar tenderness to palpation   Neurological: She is alert and oriented to person, place, and time  Skin: Skin is warm and dry  She is not diaphoretic  Additional Data:     Labs:      Results from last 7 days  Lab Units 12/01/18  0440   WBC Thousand/uL 6 66   HEMOGLOBIN g/dL 12 2   HEMATOCRIT % 36 2   PLATELETS Thousands/uL 182   NEUTROS PCT % 73   LYMPHS PCT % 14   MONOS PCT % 7   EOS PCT % 5       Results from last 7 days  Lab Units 12/02/18  0505  11/30/18  1247   SODIUM mmol/L 135*  < > 131*   POTASSIUM mmol/L 3 8  < > 4 2   CHLORIDE mmol/L 103  < > 96*   CO2 mmol/L 22  < > 26   BUN mg/dL 12  < > 15   CREATININE mg/dL 0 71  < > 0 85   ANION GAP mmol/L 10  < > 9   CALCIUM mg/dL 8 0*  < > 8 5   ALBUMIN g/dL  --   --  3 1*   TOTAL BILIRUBIN mg/dL  --   --  0 81   ALK PHOS U/L  --   --  86   ALT U/L  --   --  25   AST U/L  --   --  22   GLUCOSE RANDOM mg/dL 110  < > 111   < > = values in this interval not displayed  * I Have Reviewed All Lab Data Listed Above    * Additional Pertinent Lab Tests Reviewed: Stephanie 66 Admission Reviewed    Imaging:    Imaging Reports Reviewed Today Include:  None available  Imaging Personally Reviewed by Myself Includes:  None    Recent Cultures (last 7 days):           Last 24 Hours Medication List:     Current Facility-Administered Medications:  amLODIPine 2 5 mg Oral BID Mahad Santana MD   aspirin 325 mg Oral Daily Horace Porter MD   atenolol 25 mg Oral Daily Horace Porter MD   heparin (porcine) 5,000 Units Subcutaneous Q12H [address] Horace Porter MD   HYDROmorphone 0 2 mg Intravenous Q4H PRN Chana Schmitt PA-C   HYDROmorphone 2 mg Oral Daily Horace Porter MD   ondansetron 4 mg Intravenous Q6H PRN Mahad Santana MD        Today, Patient Was Seen By: Mahad Santana MD    ** Please Note: Dictation voice to text software may have been used in the creation of this document   **

## 2018-12-02 NOTE — OCCUPATIONAL THERAPY NOTE
[address] [facility]     Patient Name: Malachi Werner  EFSQF'S Date: 12/2/2018  Problem List  Patient Active Problem List   Diagnosis    Paroxysmal atrial fibrillation (HCC)    Benign essential hypertension    Mitral valve insufficiency, acquired    Osteoporosis    TIA (transient ischemic attack)    Closed compression fracture of L1 lumbar vertebra (HCC)    Hyponatremia     Past Medical History  Past Medical History:   Diagnosis Date    Hyperlipidemia     Hypertension     L1 vertebral fracture (HCC)      Past Surgical History  Past Surgical History:   Procedure Laterality Date    BACK SURGERY             12/02/18 0817   Note Type   Note type Eval/Treat   Restrictions/Precautions   Weight Bearing Precautions Per Order No   Braces or Orthoses TLSO   Other Precautions Fall Risk;Pain;Spinal precautions   Pain Assessment   Pain Assessment 0-10   Pain Score 6   Pain Type Chronic pain   Pain Location Back   Pain Orientation Posterior; Lower   Hospital Pain Intervention(s) Ambulation/increased activity;Repositioned; Emotional support   Response to Interventions tolerated   Home Living   Type of Home House  ([facility])   Home Layout One level; Able to live on main level with bedroom/bathroom; Performs ADLs on one level  (0 KD)   Bathroom Shower/Tub Walk-in shower   Bathroom Toilet Raised   Bathroom Equipment Grab bars in shower; Shower chair   [address] Cane;Walker  (pt reports minimal use PTA)   Additional Comments Pt is resident at [facility]   Prior Function   Level of [address] with ADLs and functional mobility   Lives With Alone   Receives Help From Friend(s); Family   ADL Assistance Independent   IADLs Independent   Falls in the last 6 months 0   Vocational Retired   Comments Pt reports intermittent use of SPC for mobility, pt very active PTA   Lifestyle   Autonomy Per pt independent with ADLS, IADLS, functional transfers and mobility with intermittent use of SPC   Reciprocal Relationships friends;family   Service to Others retired, pharmacist   Intrinsic Gratification day trips with friends   ADL   Where Assessed Edge of bed   Eating Assistance 5  Isaac Small [address] 5  Supervision/Setup   LB [address] 3  Moderate Assistance   [address] 5  Supervision/Setup    [facility] 3  Moderate [address]  4  [facility] 4  Minimal Assistance   Bed Mobility   Additional Comments Pt seated EOB upon arrival, pt seated bedside chair at end of session with call bell within reach   Transfers   Sit to Stand 4  Minimal assistance   Additional items Assist x 1; Increased time required;Verbal cues   Stand to Sit 4  Minimal assistance   Additional items Assist x 1; Armrests; Increased time required;Verbal cues   Toilet transfer 4  Minimal assistance   Additional items Assist x 1; Increased time required;Verbal cues;Standard toilet  (grab bar)   Additional Comments VC for hand placement and cues for controlled descent   Functional Mobility   Functional Mobility 5  Supervision   Additional Comments supervision for safety   Additional items Rolling walker   Balance   Static Sitting Good   Dynamic Sitting Fair +   Static Standing Fair +   Dynamic Standing Fair   Ambulatory Fair   Activity Tolerance   Activity Tolerance Patient limited by fatigue;Patient limited by pain   Nurse Made Aware appropriate to see per Briana JIMÉNEZ   RUE Assessment   RUE Assessment WFL  (4/5 , MMT NT 2* osteoporosis)   LUE Assessment   LUE Assessment WFL  (4/5 , MMT NT 2* osteoporosis)   Hand Function   Gross Motor Coordination Functional   Fine Motor Coordination Functional   Sensation   Light Touch No apparent deficits   Sharp/Dull No apparent deficits   Proprioception   Proprioception No apparent deficits  (noted t/o session)   Vision-Basic Assessment   Current Vision Wears glasses only for reading   Vision - Complex Assessment   Ocular Range of Motion [facility]   Acuity Able to read normal print without difficulty   Perception   Inattention/Neglect Appears intact   Cognition   Overall Cognitive Status [facility]   Arousal/Participation Alert; Cooperative   Attention Within functional limits   Orientation Level Oriented X4   Memory Within functional limits   Following Commands Follows one step commands with increased time or repetition   Comments Pt engaged in appropriate conversation t/o session, pt with prior back surgery and understands restrictions   Assessment   Limitation Decreased ADL status; Decreased Safe judgement during ADL;Decreased UE strength;Decreased endurance;Decreased self-care trans;Decreased high-level ADLs   Prognosis Good   Assessment Pt is 79 y/o female seen for OT evaluation [facility] with increased back pain, pt w/ severe L1 compression fracture, mild L2 compression fracture  Pt with TLSO brace and spinal precautions for session  Comorbidities include: previous L3-L5 fracture s/p kyphoplasty, HTN, hyponatremia, osteoporosis  Personal factors include pt is resident at [place] [address]  Prior to arrival pt independent with ADLS, IADLS, functional transfers and mobility with intermittent use of SPC  Upon evaluation, pt with setup self-feeding, supervision grooming, supervision UB ADLS, MOD assist LB ADLS, MIN assist toileting, MIN assist sit<>stand functional transfers, supervision functional mobility with RW  Pt presents with the following deficits impacting occupational performance: increased pain, decreased strength, increased fatigue, decreased activity tolerance (FAIR), decreased balance, spinal precautions with TLSO brace, decreased endurance   These deficits functionally impact participation with Johnna Parker, functional transfers and mobility, leisure participation  Occupational performance areas to address include: dressing, grooming, bathing, toileting, light meal prep, functional transfers and mobility with RW, pt education on home safety and transfer safety  Pt educated on spinal precautions and techniques, pt provided with packet, pt receptive  Pt provided with bedside commode over toilet  Pt seated bedside chair at end of session with call bell within reach and all needs met  Pt reports no concerns with returning to [address] [place] [address]  From an OT standpoint, recommendation at time of d/c is home with HOME OT  Goals   Patient Goals "to go home"   LTG Time Frame 7-10   Long Term Goal please see goals listed below   Plan   Treatment Interventions ADL retraining;Functional transfer training;UE strengthening/ROM; Endurance training;Patient/family training;Equipment evaluation/education; Compensatory technique education; Energy conservation; Activityengagement   Goal Expiration Date 12/12/18   Treatment Day 0   OT Frequency 3-5x/wk   Recommendation   OT Discharge Recommendation Home OT   OT - OK to Discharge (home with HOME OT when medically stable)   Barthel Index   Feeding 10   Bathing 0   Grooming Score 5   Dressing Score 5   Bladder Score 10   Bowels Score 10   Toilet Use Score 5   Transfers (Bed/Chair) Score 10   Mobility (Level Surface) Score 0   Stairs Score 0   Barthel Index Score 55   Modified Chelsi Scale   Modified Chelsi Scale 4     OT goals to be met in 7-10 days:    1  Pt will complete UB ADLS with MOD I to increase participation with ADLS and IADLS  2  Pt will complete LB ADLS with MOD I and LHAE PRN to enhance participation with self-care  3  Pt will complete functional transfers with MOD I to increase independence with ADLS  4  Pt will complete functional mobility with MOD I and use of DME PRN to enhance participation with self-care  5   Pt will demonstrate bed mobility with MOD I and use of log rolling techniques to increase independence with ADLS  6  Pt will demonstrate improved toileting with MOD I  7  Pt will demonstrate improved dynamic standing tolerance to 5 minutes with G balance to increase participation with functional tasks  8  Pt will demonstrate improved activity tolerance to G (30 minutes) to enhance participation with functional tasks  9  Pt will adhere to spinal precautions 100% time t/o sessions to assist with safety at home  10   Pt will demonstrate 100% carryover of transfer safety techniques for 3/3 trials to increase safety at home    MARIELOS LOVE, OTS

## 2018-12-02 NOTE — PHYSICIAN ADVISOR
Current patient class: Observation  The patient is currently on Hospital Day: 2 at [facility]      The patient was admitted to the hospital at N/A on N/A for the following diagnosis:  Back pain [M54 9]  Compression fracture of L2 lumbar vertebra ([address] ) [S32 020A]  Compression fracture of L1 lumbar vertebra ([address] ) [S32 010A]       There is documentation in the medical record of an expected length of stay of at least 2 midnights  The patient is therefore expected to satisfy the 2 midnight benchmark and given the 2 midnight presumption is appropriate for INPATIENT ADMISSION  Given this expectation of a satisfying stay, CMS instructs us that the patient is most often appropriate for inpatient admission under part A provided medical necessity is documented in the chart  After review of the relevant documentation, labs, vital signs and test results, the patient is appropriate for INPATIENT ADMISSION  Admission to the hospital as an inpatient is a complex decision making process which requires the practitioner to consider the patients presenting complaint, history and physical examination and all relevant testing  With this in mind, in this case, the patient was deemed appropriate for INPATIENT ADMISSION  After review of the documentation and testing available at the time of the admission I concur with this clinical determination of medical necessity  Rationale is as follows: The patient is a 80 yrs old Female who presented to the ED at 11/30/2018 11:28 AM with a chief complaint of Back Pain (back pain, hx L1 fracture  pain worsening since Tuesday evening  taking pain meds as prescribed, dilaudid last taken at 0830 this morning  states no relief   )     The patient presented with severe low back pain  The patient had a L1 compression fracture  The plan of care includes orthopedics surgery consultation, pain control, repeat labs, IVF   This patient is appropriate for INPATIENT admission; continued hospitalization is necessary to ensure stabilization of her clinical status       The patients vitals on arrival were ED Triage Vitals   Temperature Pulse Respirations Blood Pressure SpO2   11/30/18 1135 11/30/18 1135 11/30/18 1135 11/30/18 1135 11/30/18 1135   98 6 °F (37 °C) 80 20 130/71 95 %      Temp Source Heart Rate Source Patient Position - Orthostatic VS BP Location FiO2 (%)   11/30/18 1845 11/30/18 2342 11/30/18 1508 11/30/18 1508 --   Temporal Monitor Lying Right arm       Pain Score       11/30/18 1135       7           Past Medical History:   Diagnosis Date    Hyperlipidemia     Hypertension     L1 vertebral fracture ([facility] )      Past Surgical History:   Procedure Laterality Date    BACK SURGERY             Consults have been placed to:   IP CONSULT TO ORTHOPEDIC SURGERY    Vitals:    11/30/18 2107 11/30/18 2342 12/01/18 0726 12/01/18 1523   BP: 141/85 142/86 144/89 113/73   BP Location: Right arm Right arm Right arm Right arm   Pulse:  75 82 77   Resp:  18 19 20   Temp:  99 °F (37 2 °C) 99 2 °F (37 3 °C) 98 °F (36 7 °C)   TempSrc:  Temporal Temporal Temporal   SpO2:  95% 90% 93%   Weight:           Most recent labs:    Recent Labs      11/30/18   1247  12/01/18   0440   WBC  7 79  6 66   HGB  13 4  12 2   HCT  39 8  36 2   PLT  182  182   K  4 2  3 8   CALCIUM  8 5  8 3   BUN  15  11   CREATININE  0 85  0 72   AST  22   --    ALT  25   --    ALKPHOS  86   --        Scheduled Meds:  Current Facility-Administered Medications:  amLODIPine 2 5 mg Oral BID Radha Lennon MD    aspirin 325 mg Oral Daily Horace Porter MD    atenolol 25 mg Oral Daily Horace Porter MD    heparin (porcine) 5,000 Units Subcutaneous Q12H [address] Horace Porter MD    HYDROmorphone 0 2 mg Intravenous Q4H PRN Chana Schmitt PA-C    HYDROmorphone 2 mg Oral Daily Horace Porter MD    ondansetron 4 mg Intravenous Q6H PRN Radha Lennon MD    sodium chloride 75 mL/hr Intravenous Continuous Radha Lennon MD Last Rate: 75 mL/hr (12/01/18 2101)     Continuous Infusions:  sodium chloride 75 mL/hr Last Rate: 75 mL/hr (12/01/18 2101)     PRN Meds:  HYDROmorphone    ondansetron    Surgical procedures (if appropriate):

## 2018-12-03 VITALS
HEART RATE: 88 BPM | DIASTOLIC BLOOD PRESSURE: 67 MMHG | RESPIRATION RATE: 19 BRPM | OXYGEN SATURATION: 94 % | BODY MASS INDEX: 23.83 KG/M2 | SYSTOLIC BLOOD PRESSURE: 109 MMHG | WEIGHT: 136.69 LBS | TEMPERATURE: 98.5 F

## 2018-12-03 PROCEDURE — 99239 HOSP IP/OBS DSCHRG MGMT >30: CPT | Performed by: HOSPITALIST

## 2018-12-03 RX ADMIN — ATENOLOL 25 MG: 50 TABLET ORAL at 08:28

## 2018-12-03 RX ADMIN — ASPIRIN 325 MG: 325 TABLET, COATED ORAL at 08:28

## 2018-12-03 RX ADMIN — HYDROMORPHONE HYDROCHLORIDE 2 MG: 2 TABLET ORAL at 10:45

## 2018-12-03 RX ADMIN — HYDROMORPHONE HYDROCHLORIDE 0.2 MG: 1 INJECTION, SOLUTION INTRAMUSCULAR; INTRAVENOUS; SUBCUTANEOUS at 06:28

## 2018-12-03 RX ADMIN — AMLODIPINE BESYLATE 2.5 MG: 2.5 TABLET ORAL at 08:28

## 2018-12-03 RX ADMIN — ONDANSETRON 4 MG: 2 INJECTION INTRAMUSCULAR; INTRAVENOUS at 08:28

## 2018-12-03 RX ADMIN — HYDROMORPHONE HYDROCHLORIDE 0.2 MG: 1 INJECTION, SOLUTION INTRAMUSCULAR; INTRAVENOUS; SUBCUTANEOUS at 16:54

## 2018-12-03 NOTE — UTILIZATION REVIEW
OBSERVATION WRITTEN 11/30/18 @ 7204 CONVERTED TO INPATIENT ADMISSION 12/01/18 @ 9214 DUE TO FURTHER DIAGNOSTIC WORKUP REQUIRED FOR PT/OT EVALUATION, REQUIRING AT LEAST A 2 MIDNIGHT STAY  Admitting Physician MELA HERNANDEZ    Level of Care Med Surg    Estimated length of stay More than 2 Midnights    Certification I certify that inpatient services are medically necessary for this patient for a duration of greater than two midnights  See H&P and MD Progress Notes for additional information about the patient's course of treatment  * Closed compression fracture of L1 lumbar vertebra [facility])   Assessment & Plan     Consult PT/OT for evaluation  Orthopedic surgery consulted and patient will be fitted with a TLSO brace  Orthopedic surgery recommends weight-bearing as tolerated  Continue pain control with Dilaudid  Subcutaneous heparin 5000 units q 12 hours for DVT prophylaxis       Benign essential hypertension   Assessment & Plan     Continue atenolol 25 mg daily and atenolol 2 5 mg b i d  Blood pressure is well controlled      Hyponatremia   Assessment & Plan     Was likely hypovolemic hyponatremia due to dehydration  Continue normal saline at 75 mL/hour          VTE Pharmacologic Prophylaxis:   Pharmacologic: Heparin  Mechanical VTE Prophylaxis in Place: No     Certification Statement: The patient will continue to require additional inpatient hospital stay due to Requires PT/OT evaluation     Discharge Plan: To be determined    [address] [place] [address] Review Department  Phone: [phone]; Fax [phone]  [email or web address]  ATTENTION: Please call with any questions or concerns to [phone]  and carefully listen to the prompts so that you are directed to the right person  Send all requests for admission clinical reviews, approved or denied determinations and any other requests to fax [phone]   All voicemails are confidential

## 2018-12-03 NOTE — SOCIAL WORK
DANIEL spoke with [facility] with pt in her room  Pt is a member with [facility]  They are able to provide a care service to bring pt back to her residence  Earliest time available is 7pm  DANIEL made RN and pt aware

## 2018-12-03 NOTE — ASSESSMENT & PLAN NOTE
Seen by ortho, recommend TLSO brace  She is already on Dilaudid at home, so she can continue that  Justino Mann to discharge home

## 2018-12-03 NOTE — DISCHARGE SUMMARY
Discharge- Ruddy Pat 7/20/1932, 80 y o  female MRN: [id number]    Unit/Bed#: E5 -01 Encounter: [id number]    Primary Care Provider: Jose Elias Fernando   Date and time admitted to hospital: 11/30/2018 11:28 AM        * Closed compression fracture of L1 lumbar vertebra [facility])   Assessment & Plan    Seen by ortho, recommend TLSO brace  She is already on Dilaudid at home, so she can continue that  Hope Keisha to discharge home         Discharging Physician / Practitioner: Roseanne Lovelace DO  PCP: Jose Elias Fernando  Admission Date:   Admission Orders     Ordered        12/01/18 2115  Inpatient Admission  Once         11/30/18 1532  Place in Observation (expected length of stay for this patient is less than two midnights)  Once             Discharge Date: 12/03/18    Resolved Problems  Date Reviewed: 12/3/2018    None            Consultations During Hospital Stay:  · ortho      Procedures Performed:     · CT spine  ·       Reason for Admission: back pain      Hospital Course:     Ruddy Pat is a 80 y o  female patient who originally presented to the hospital on 11/30/2018 due to back pain  Found to have compression fracture of L1  Please see above list of diagnoses and related plan for additional information  Condition at Discharge: good       Discharge Day Visit / Exam:     Subjective:  Doing better  Wants to go home  Says pain is tolerable      Vitals: Blood Pressure: 109/67 (12/03/18 1511)  Pulse: 88 (12/03/18 1511)  Temperature: 98 5 °F (36 9 °C) (12/03/18 1511)  Temp Source: Temporal (12/03/18 1511)  Respirations: 19 (12/03/18 1511)  Weight - Scale: 62 kg (136 lb 11 oz) (11/30/18 1135)  SpO2: 94 % (12/03/18 1511)    Exam:     Physical Exam   HENT:   Head: Normocephalic and atraumatic  Eyes: Pupils are equal, round, and reactive to light  EOM are normal    Cardiovascular: Normal rate and regular rhythm  Exam reveals no gallop and no friction rub  No murmur heard    Pulmonary/Chest: Effort normal and breath sounds normal  She has no wheezes  She has no rales  Abdominal: Soft  Bowel sounds are normal  There is no tenderness  Musculoskeletal: She exhibits no edema  Nursing note and vitals reviewed            Discharge instructions/Information to patient and family:   See after visit summary for information provided to patient and family  Provisions for Follow-Up Care:  See after visit summary for information related to follow-up care and any pertinent home health orders  Disposition:     Home       Discharge Statement:  I spent 36 minutes discharging the patient  This time was spent on the day of discharge  I had direct contact with the patient on the day of discharge  Greater than 50% of the total time was spent examining patient, answering all patient questions, arranging and discussing plan of care with patient as well as directly providing post-discharge instructions  Additional time then spent on discharge activities  Discharge Medications:  See after visit summary for reconciled discharge medications provided to patient and family        ** Please Note: This note has been constructed using a voice recognition system **

## 2018-12-03 NOTE — NURSING NOTE
Reviewed discharge instructions with patient including when to take next doses of medications, importance of using spinal brace, VNA PT, etc  IV was removed and patient was discharged with transporter via wheelchair

## 2019-01-28 DIAGNOSIS — I10 ESSENTIAL HYPERTENSION: ICD-10-CM

## 2019-01-28 RX ORDER — AMLODIPINE BESYLATE 2.5 MG/1
TABLET ORAL
Qty: 180 TABLET | Refills: 3 | Status: SHIPPED | OUTPATIENT
Start: 2019-01-28 | End: 2020-03-11

## 2019-02-11 ENCOUNTER — OFFICE VISIT (OUTPATIENT)
Dept: CARDIOLOGY CLINIC | Facility: CLINIC | Age: 84
End: 2019-02-11
Payer: MEDICARE

## 2019-02-11 VITALS
HEIGHT: 64 IN | SYSTOLIC BLOOD PRESSURE: 110 MMHG | HEART RATE: 70 BPM | WEIGHT: 128.2 LBS | BODY MASS INDEX: 21.89 KG/M2 | DIASTOLIC BLOOD PRESSURE: 82 MMHG

## 2019-02-11 DIAGNOSIS — I10 BENIGN ESSENTIAL HYPERTENSION: ICD-10-CM

## 2019-02-11 DIAGNOSIS — I48.0 PAROXYSMAL ATRIAL FIBRILLATION (HCC): Primary | ICD-10-CM

## 2019-02-11 DIAGNOSIS — I34.0 MITRAL VALVE INSUFFICIENCY, ACQUIRED: ICD-10-CM

## 2019-02-11 PROCEDURE — 1124F ACP DISCUSS-NO DSCNMKR DOCD: CPT | Performed by: INTERNAL MEDICINE

## 2019-02-11 PROCEDURE — 99213 OFFICE O/P EST LOW 20 MIN: CPT | Performed by: INTERNAL MEDICINE

## 2019-02-11 PROCEDURE — 93000 ELECTROCARDIOGRAM COMPLETE: CPT | Performed by: INTERNAL MEDICINE

## 2019-02-11 RX ORDER — CYCLOSPORINE 0.5 MG/ML
1 EMULSION OPHTHALMIC EVERY 12 HOURS
COMMUNITY

## 2019-02-11 NOTE — PROGRESS NOTES
Cardiology Follow Up    Shen Smoker  7/20/1932  [id number]  [facility] [facility] [id number] [phone]    Reason for visit: 6 month FU for PAF, HTN and Mild to moderate MR           1  Paroxysmal atrial fibrillation (HCC)  POCT ECG   2  Benign essential hypertension     3  Mitral valve insufficiency, acquired         Interval History: Since her last visit she denies CP or SOB    She denies palpitations or edema  She denies lightheadedness  She has another compression fracture and has nausea       Patient Active Problem List   Diagnosis    Paroxysmal atrial fibrillation (HCC)    Benign essential hypertension    Mitral valve insufficiency, acquired    Osteoporosis    TIA (transient ischemic attack)    Closed compression fracture of L1 lumbar vertebra (HCC)    Hyponatremia     Past Medical History:   Diagnosis Date    Hyperlipidemia     Hypertension     L1 vertebral fracture (HCC)      Social History     Socioeconomic History    Marital status: Single     Spouse name: Not on file    Number of children: Not on file    Years of education: Not on file    Highest education level: Not on file   Occupational History    Not on file   Social Needs    Financial resource strain: Not on file    Food insecurity:     Worry: Not on file     Inability: Not on file    Transportation needs:     Medical: Not on file     Non-medical: Not on file   Tobacco Use    Smoking status: Former Smoker    Smokeless tobacco: Never Used   Substance and Sexual Activity    Alcohol use: No    Drug use: No    Sexual activity: Not on file   Lifestyle    Physical activity:     Days per week: Not on file     Minutes per session: Not on file    Stress: Not on file   Relationships    Social connections:     Talks on phone: Not on file     Gets together: Not on file     Attends Yazidism service: Not on file     Active member of club or organization: Not on file     Attends meetings of clubs or organizations: Not on file     Relationship status: Not on file    Intimate partner violence:     Fear of current or ex partner: Not on file     Emotionally abused: Not on file     Physically abused: Not on file     Forced sexual activity: Not on file   Other Topics Concern    Not on file   Social History Narrative    Not on file      Family History   Problem Relation Age of Onset    Heart disease Mother      Past Surgical History:   Procedure Laterality Date    BACK SURGERY         Current Outpatient Medications:     amLODIPine (NORVASC) 2 5 mg tablet, TAKE ONE TABLET BY MOUTH TWICE DAILY , Disp: 180 tablet, Rfl: 3    aspirin (ECOTRIN) 325 mg EC tablet, Take 325 mg by mouth once , Disp: , Rfl:     atenolol (TENORMIN) 25 mg tablet, Take 1 tablet (25 mg total) by mouth daily, Disp: 90 tablet, Rfl: 3    Cholecalciferol 2000 units CAPS, Take 1 capsule by mouth, Disp: , Rfl:     cycloSPORINE (RESTASIS) 0 05 % ophthalmic emulsion, Apply 1 drop to eye, Disp: , Rfl:     HYDROmorphone (DILAUDID) 2 mg tablet, Take 2 mg by mouth 4 (four) times a day , Disp: , Rfl:     Multiple Vitamins tablet, Take 1 tablet by mouth daily, Disp: , Rfl:     ondansetron (ZOFRAN) 4 mg tablet, Take by mouth, Disp: , Rfl:   Allergies   Allergen Reactions    Iodine Shortness Of Breath    Buprenorphine GI Intolerance and Other (See Comments)    Hydrocodone-Acetaminophen      Vomiting    Hydrocodone-Acetaminophen      Vomiting  Vomiting    Iodinated Diagnostic Agents     Metrizamide     Morphine     Naproxen GI Intolerance    Shellfish Allergy     Glucosamine Rash    Other Other (See Comments)     red raised areas - please use paper tape    Shellfish-Derived Products Rash       Review of Systems:  Review of Systems   Constitutional: Negative for fatigue and unexpected weight change  Respiratory: Negative for shortness of breath      Cardiovascular: Negative for chest pain, palpitations and leg swelling  Gastrointestinal: Positive for nausea  Negative for blood in stool, constipation and diarrhea  Genitourinary: Negative for frequency  Musculoskeletal: Positive for arthralgias and back pain  Physical Exam:  Vitals:    02/11/19 0904   BP: 110/82   BP Location: Right arm   Patient Position: Sitting   Cuff Size: Standard   Pulse: 70   Weight: 58 2 kg (128 lb 3 2 oz)   Height: 5' 3 5" (1 613 m)       Physical Exam   Constitutional: She appears well-developed and well-nourished  No distress  HENT:   Head: Normocephalic and atraumatic  Mouth/Throat: Oropharynx is clear and moist  No oropharyngeal exudate  Eyes: Conjunctivae are normal  No scleral icterus  Neck: Normal carotid pulses and no JVD present  Carotid bruit is not present  No thyromegaly present  Cardiovascular: Normal rate, regular rhythm and intact distal pulses  Exam reveals no gallop and no friction rub  Murmur (apical systolic murmur grade 3) heard  Pulses:       Dorsalis pedis pulses are 2+ on the right side, and 2+ on the left side  Posterior tibial pulses are 2+ on the right side, and 2+ on the left side  Pulmonary/Chest: Effort normal  She has no wheezes  She has no rhonchi  She has no rales  Abdominal: She exhibits no mass  There is no hepatosplenomegaly  There is no tenderness  Musculoskeletal: She exhibits no edema  Discussion/Summary:  1  PAF-in NSR  Ana Skipper Refuses warfarin or NOAC despite a hx of CVA    Takes full dose ASA  Ana Skipper Continue atenolol for BP and potential rate control  2  HTN    Well controlled on amlodipine and atenolol  3  MR-mild to moderate MR    Should not be problematic going forward        Belinda Dorantes MD

## 2019-04-26 ENCOUNTER — TELEPHONE (OUTPATIENT)
Dept: CARDIOLOGY CLINIC | Facility: CLINIC | Age: 84
End: 2019-04-26

## 2019-05-01 ENCOUNTER — OFFICE VISIT (OUTPATIENT)
Dept: URGENT CARE | Age: 84
End: 2019-05-01
Payer: MEDICARE

## 2019-05-01 ENCOUNTER — TELEPHONE (OUTPATIENT)
Dept: URGENT CARE | Age: 84
End: 2019-05-01

## 2019-05-01 ENCOUNTER — APPOINTMENT (OUTPATIENT)
Dept: RADIOLOGY | Age: 84
End: 2019-05-01
Payer: MEDICARE

## 2019-05-01 VITALS
SYSTOLIC BLOOD PRESSURE: 157 MMHG | HEIGHT: 60 IN | WEIGHT: 123 LBS | OXYGEN SATURATION: 98 % | BODY MASS INDEX: 24.15 KG/M2 | TEMPERATURE: 97.6 F | RESPIRATION RATE: 16 BRPM | DIASTOLIC BLOOD PRESSURE: 75 MMHG | HEART RATE: 70 BPM

## 2019-05-01 DIAGNOSIS — M54.6 CHRONIC MIDLINE THORACIC BACK PAIN: ICD-10-CM

## 2019-05-01 DIAGNOSIS — M54.50 LOW BACK PAIN WITHOUT SCIATICA, UNSPECIFIED BACK PAIN LATERALITY, UNSPECIFIED CHRONICITY: Primary | ICD-10-CM

## 2019-05-01 DIAGNOSIS — M54.50 LOW BACK PAIN WITHOUT SCIATICA, UNSPECIFIED BACK PAIN LATERALITY, UNSPECIFIED CHRONICITY: ICD-10-CM

## 2019-05-01 DIAGNOSIS — G89.29 CHRONIC MIDLINE THORACIC BACK PAIN: ICD-10-CM

## 2019-05-01 PROCEDURE — 72072 X-RAY EXAM THORAC SPINE 3VWS: CPT

## 2019-05-01 PROCEDURE — 99213 OFFICE O/P EST LOW 20 MIN: CPT | Performed by: NURSE PRACTITIONER

## 2019-05-01 PROCEDURE — G0463 HOSPITAL OUTPT CLINIC VISIT: HCPCS | Performed by: NURSE PRACTITIONER

## 2019-05-01 PROCEDURE — 72100 X-RAY EXAM L-S SPINE 2/3 VWS: CPT

## 2019-10-02 ENCOUNTER — OFFICE VISIT (OUTPATIENT)
Dept: URGENT CARE | Age: 84
End: 2019-10-02
Payer: MEDICARE

## 2019-10-02 ENCOUNTER — APPOINTMENT (OUTPATIENT)
Dept: RADIOLOGY | Age: 84
End: 2019-10-02
Payer: MEDICARE

## 2019-10-02 VITALS
HEIGHT: 60 IN | SYSTOLIC BLOOD PRESSURE: 119 MMHG | HEART RATE: 82 BPM | TEMPERATURE: 97.6 F | OXYGEN SATURATION: 95 % | RESPIRATION RATE: 20 BRPM | WEIGHT: 121 LBS | DIASTOLIC BLOOD PRESSURE: 89 MMHG | BODY MASS INDEX: 23.75 KG/M2

## 2019-10-02 DIAGNOSIS — M25.551 PAIN OF RIGHT HIP JOINT: ICD-10-CM

## 2019-10-02 DIAGNOSIS — M25.551 PAIN OF RIGHT HIP JOINT: Primary | ICD-10-CM

## 2019-10-02 PROCEDURE — 99213 OFFICE O/P EST LOW 20 MIN: CPT | Performed by: PHYSICIAN ASSISTANT

## 2019-10-02 PROCEDURE — 73552 X-RAY EXAM OF FEMUR 2/>: CPT

## 2019-10-02 PROCEDURE — G0463 HOSPITAL OUTPT CLINIC VISIT: HCPCS | Performed by: PHYSICIAN ASSISTANT

## 2019-10-02 PROCEDURE — 72170 X-RAY EXAM OF PELVIS: CPT

## 2019-10-02 RX ORDER — POLYMYXIN B SULFATE AND TRIMETHOPRIM 1; 10000 MG/ML; [USP'U]/ML
SOLUTION OPHTHALMIC
Status: ON HOLD | COMMUNITY
Start: 2019-05-24 | End: 2020-06-06

## 2019-10-02 RX ORDER — METRONIDAZOLE 500 MG/1
TABLET ORAL
COMMUNITY
Start: 2019-08-10 | End: 2019-10-02

## 2019-10-02 RX ORDER — LIDOCAINE 50 MG/G
1 PATCH TOPICAL DAILY
Qty: 5 PATCH | Refills: 0 | Status: ON HOLD | OUTPATIENT
Start: 2019-10-02 | End: 2020-06-06

## 2019-10-02 RX ORDER — POLYMYXIN B SULFATE AND TRIMETHOPRIM 1; 10000 MG/ML; [USP'U]/ML
SOLUTION OPHTHALMIC
COMMUNITY
Start: 2019-07-24 | End: 2019-10-02

## 2019-10-02 RX ORDER — HYDROMORPHONE HYDROCHLORIDE 2 MG/1
2 TABLET ORAL EVERY 6 HOURS PRN
COMMUNITY
Start: 2019-09-27 | End: 2019-10-02

## 2019-10-02 NOTE — PROGRESS NOTES
[facility]        NAME: Lytle Goldberg is a 80 y o  female  : 1932    MRN: [id number]  DATE: 2019  TIME: 1:09 PM    Assessment and Plan   Pain of right hip joint [M25 551]  1  Pain of right hip joint  XR femur 2 vw right    XR pelvis ap only 1 or 2 vw    lidocaine (LIDODERM) 5 %    CANCELED: XR hip/pelv 2-3 vws right if performed         Patient Instructions     Recheck with your pain specialist   Follow up with PCP in 3-5 days  Proceed to  ER if symptoms worsen  Chief Complaint     Chief Complaint   Patient presents with    Hip Pain     Pt c/o right hip pain that radiates down her right knee for the past 2 weeks  Denies injury  History of Present Illness       Pt with chronic low back pain  On dilaudid from pain management  Pt with new right hip pain into right thigh for little over a week     Hip Pain    The incident occurred 5 to 7 days ago  The incident occurred at home  There was no injury mechanism  The pain is present in the right thigh  The quality of the pain is described as aching  The pain is at a severity of 3/10  The pain is mild  The pain has been intermittent since onset  Pertinent negatives include no inability to bear weight, loss of motion, loss of sensation, muscle weakness, numbness or tingling  She reports no foreign bodies present  Nothing aggravates the symptoms  Treatments tried: pt on dilaudid  The treatment provided no relief  Review of Systems   Review of Systems   Constitutional: Negative  HENT: Negative  Eyes: Negative  Respiratory: Negative  Cardiovascular: Negative  Gastrointestinal: Negative  Endocrine: Negative  Genitourinary: Negative  Musculoskeletal: Positive for back pain  Skin: Negative  Allergic/Immunologic: Negative  Neurological: Negative  Negative for tingling and numbness  Hematological: Negative  Psychiatric/Behavioral: Negative      All other systems reviewed and are negative          Current Medications       Current Outpatient Medications:     amLODIPine (NORVASC) 2 5 mg tablet, TAKE ONE TABLET BY MOUTH TWICE DAILY , Disp: 180 tablet, Rfl: 3    atenolol (TENORMIN) 25 mg tablet, Take 1 tablet (25 mg total) by mouth daily, Disp: 90 tablet, Rfl: 3    Cholecalciferol 2000 units CAPS, Take 1 capsule by mouth, Disp: , Rfl:     cycloSPORINE (RESTASIS) 0 05 % ophthalmic emulsion, Apply 1 drop to eye, Disp: , Rfl:     HYDROmorphone (DILAUDID) 2 mg tablet, Take 2 mg by mouth 4 (four) times a day , Disp: , Rfl:     Multiple Vitamins tablet, Take 1 tablet by mouth daily, Disp: , Rfl:     polymyxin b-trimethoprim (POLYTRIM) ophthalmic solution, , Disp: , Rfl:     lidocaine (LIDODERM) 5 %, Apply 1 patch topically daily Remove & Discard patch within 12 hours or as directed by MD, Disp: 5 patch, Rfl: 0    ondansetron (ZOFRAN) 4 mg tablet, Take by mouth, Disp: , Rfl:     Current Allergies     Allergies as of 10/02/2019 - Reviewed 10/02/2019   Allergen Reaction Noted    Iodine Shortness Of Breath 07/23/2012    Buprenorphine GI Intolerance and Other (See Comments) 10/13/2016    Hydrocodone-acetaminophen  10/05/2015    Hydrocodone-acetaminophen  10/05/2015    Iodinated diagnostic agents  03/06/2013    Metrizamide  03/06/2013    Morphine  12/21/2016    Naproxen GI Intolerance 01/06/2016    Shellfish allergy  03/06/2013    Glucosamine Rash 07/23/2012    Other Other (See Comments) 07/23/2012    Shellfish-derived products Rash 07/23/2012            The following portions of the patient's history were reviewed and updated as appropriate: allergies, current medications, past family history, past medical history, past social history, past surgical history and problem list      Past Medical History:   Diagnosis Date    Hyperlipidemia     Hypertension     L1 vertebral fracture (HCC)     Osteoporosis     Paroxysmal A-fib (HCC)     TIA (transient ischemic attack)        Past Surgical History:   Procedure Laterality Date    BACK SURGERY         Family History   Problem Relation Age of Onset    Heart disease Mother          Medications have been verified  Objective   /89   Pulse 82   Temp 97 6 °F (36 4 °C)   Resp 20   Ht 5' (1 524 m)   Wt 54 9 kg (121 lb)   SpO2 95%   BMI 23 63 kg/m²        Physical Exam     Physical Exam   Constitutional: She is oriented to person, place, and time  She appears well-developed and well-nourished  Pt declines meds for eye  Wants to see her ophth doctor    HENT:   Head: Normocephalic and atraumatic  Right Ear: External ear normal    Left Ear: External ear normal    Nose: Nose normal    Mouth/Throat: Oropharynx is clear and moist    Eyes: Pupils are equal, round, and reactive to light  EOM are normal    Injected bilat    Neck: Normal range of motion  Neck supple  Cardiovascular: Normal rate, regular rhythm, normal heart sounds and intact distal pulses  Pulmonary/Chest: Effort normal and breath sounds normal    Abdominal: Soft  Bowel sounds are normal    Musculoskeletal: Normal range of motion  Right sciatic notch pain dtr wnl great toe ext strong bilat dp pulses strong bilat   Right [id number] paresthesias       No hip erythema or swelling or warmth    Neurological: She is alert and oriented to person, place, and time  Skin: Skin is warm  Capillary refill takes less than 2 seconds  Psychiatric: She has a normal mood and affect  Her behavior is normal    Nursing note and vitals reviewed

## 2019-10-02 NOTE — PATIENT INSTRUCTIONS
Hip Pain   WHAT YOU NEED TO KNOW:   Hip pain can be caused by a number of conditions, such as bursitis, arthritis, or muscle or tendon strain  X-rays do not show broken bones  You may have swelling in the fluid-filled sacs that protect your muscles and tendons  Hip pain can also be caused by a lower back problem  Hip pain may be caused by trauma, playing sports, or running  Your pain may start in your hip and go to your thigh, buttock, or groin  DISCHARGE INSTRUCTIONS:   Medicines:   · NSAIDs , such as ibuprofen, help decrease swelling, pain, and fever  This medicine is available with or without a doctor's order  NSAIDs can cause stomach bleeding or kidney problems in certain people  If you take blood thinner medicine, always ask your healthcare provider if NSAIDs are safe for you  Always read the medicine label and follow directions  · Take your medicine as directed  Contact your healthcare provider if you think your medicine is not helping or if you have side effects  Tell him of her if you are allergic to any medicine  Keep a list of the medicines, vitamins, and herbs you take  Include the amounts, and when and why you take them  Bring the list or the pill bottles to follow-up visits  Carry your medicine list with you in case of an emergency  Return to the emergency department if:   · Your pain gets worse  · You have numbness in your leg or toes  · You cannot put any weight on or move your hip  Contact your healthcare provider if:   · You have a fever  · Your pain does not decrease, even after treatment  · You have questions or concerns about your condition or care  Follow up with your healthcare provider as directed: You may need physical therapy, an injection, or more testing  You may need to see an orthopedic specialist  Write down your questions so you remember to ask them during your visits    Manage your hip pain:   · Rest  your injured hip so that it can heal  You may need to avoid putting any weight on your hip for at least 48 hours  Return to normal activities as directed  · Ice  the injury for 20 minutes every 4 hours, or as directed  Use an ice pack, or put crushed ice in a plastic bag  Cover it with a towel to protect your skin  Ice helps prevent tissue damage and decreases swelling and pain  · Elevate  your injured hip above the level of your heart as often as you can  This will help decrease swelling and pain  If possible, prop your hip and leg on pillows or blankets to keep the area elevated comfortably  · Maintain a healthy weight  Extra body weight can cause pressure and pain in your hip, knee, and ankle joints  Ask your healthcare provider how much you should weigh  Ask him to help you create a weight loss plan if you are overweight  · Use assistive devices as directed  You may need to use a cane or crutches  Assistive devices help decrease pain and pressure on your hip when you walk  Ask your healthcare provider for more information about assistive devices and how to use them correctly  © 2017 [address] [facility] Information is for End User's use only and may not be sold, redistributed or otherwise used for commercial purposes  All illustrations and images included in CareNotes® are the copyrighted property of A D A M [employer]  or [employer]  The above information is an  only  It is not intended as medical advice for individual conditions or treatments  Talk to your doctor, nurse or pharmacist before following any medical regimen to see if it is safe and effective for you  Chronic Back Pain   WHAT YOU NEED TO KNOW:   Chronic back pain is back pain that lasts 3 months or longer  This may include pain that has not been controlled or does not improve with treatment  Your back pain may cause weakness or pain that spreads to your arms or legs    DISCHARGE INSTRUCTIONS:   Return to the emergency department if:   · You have severe pain     · You have new signs of numbness or weakness, especially in your lower back, legs, arms, or genital area  · You lose control of your bladder or bowel movements  · You have a fever or sudden weight loss  Contact your healthcare provider if:   · You have new or worsened pain  · You have questions or concerns about your condition or care  Medicines:   · NSAIDs  help decrease swelling and pain  This medicine can be bought with or without a doctor's order  This medicine can cause stomach bleeding or kidney problems in certain people  If you take blood thinner medicine, always ask your healthcare provider if NSAIDs are safe for you  Always read the medicine label and follow the directions on it before using this medicine  · Acetaminophen  decreases pain  It is available without a doctor's order  Ask how much to take and how often to take it  Follow directions  Acetaminophen can cause liver damage if not taken correctly  · Prescription pain medicine  may also be given to decrease pain  Do not wait until the pain is severe before you take this medicine  · Muscle relaxers  help decrease muscle spasms and back pain  · Take your medicine as directed  Contact your healthcare provider if you think your medicine is not helping or if you have side effects  Tell him if you are allergic to any medicine  Keep a list of the medicines, vitamins, and herbs you take  Include the amounts, and when and why you take them  Bring the list or the pill bottles to follow-up visits  Carry your medicine list with you in case of an emergency  Follow up with your healthcare provider as directed: You may be referred to a sports medicine or spine specialist  Write down your questions so you remember to ask them during your visits  Manage your chronic back pain:   · Heat  helps decrease pain and muscle spasms  Apply heat on your back for 15 to 20 minutes every 2 hours or as often as directed       · Stay active  as much as you can without causing more pain  Ask your healthcare provider what exercises are right for you  Do not sit or lie down for long periods  This could make your back pain worse  Avoid heavy lifting until your pain is gone  · A physical therapist  can teach you exercises to help improve movement and strength, and to decrease pain  © 2017 [employer] [employer] Information is for End User's use only and may not be sold, redistributed or otherwise used for commercial purposes  All illustrations and images included in CareNotes® are the copyrighted property of [employer]  or [employer]  The above information is an  only  It is not intended as medical advice for individual conditions or treatments  Talk to your doctor, nurse or pharmacist before following any medical regimen to see if it is safe and effective for you

## 2019-10-18 ENCOUNTER — OFFICE VISIT (OUTPATIENT)
Dept: CARDIOLOGY CLINIC | Facility: CLINIC | Age: 84
End: 2019-10-18
Payer: MEDICARE

## 2019-10-18 VITALS
HEIGHT: 60 IN | WEIGHT: 129 LBS | OXYGEN SATURATION: 98 % | HEART RATE: 78 BPM | BODY MASS INDEX: 25.32 KG/M2 | SYSTOLIC BLOOD PRESSURE: 120 MMHG | DIASTOLIC BLOOD PRESSURE: 70 MMHG

## 2019-10-18 DIAGNOSIS — I34.0 MITRAL VALVE INSUFFICIENCY, ACQUIRED: ICD-10-CM

## 2019-10-18 DIAGNOSIS — I10 BENIGN ESSENTIAL HYPERTENSION: ICD-10-CM

## 2019-10-18 DIAGNOSIS — I48.0 PAROXYSMAL ATRIAL FIBRILLATION (HCC): Primary | ICD-10-CM

## 2019-10-18 PROCEDURE — 99214 OFFICE O/P EST MOD 30 MIN: CPT | Performed by: INTERNAL MEDICINE

## 2019-10-18 NOTE — PROGRESS NOTES
Cardiology Follow Up    Arturo Braga  7/20/1932  [id number]  [address] [facility] [address]  [phone]  [phone]    Reason for visit: 6 month FU for PAF, HTN and mild to moderate MR      1  Paroxysmal atrial fibrillation (HCC)     2  Benign essential hypertension     3  Mitral valve insufficiency, acquired         Interval History: Since her last visit she did have BP problems in April after a knee injection    She is back to her normal dosages of BP meds with normal BP  She denies CP or SOB    She denies lightheadedness    She has ongoing back pain and arthritis pains  She denies lightheadedness         Patient Active Problem List   Diagnosis    Paroxysmal atrial fibrillation (HCC)    Benign essential hypertension    Mitral valve insufficiency, acquired    Osteoporosis    TIA (transient ischemic attack)    Closed compression fracture of L1 lumbar vertebra    Hyponatremia     Past Medical History:   Diagnosis Date    Hyperlipidemia     Hypertension     L1 vertebral fracture (HCC)     Osteoporosis     Paroxysmal A-fib (HCC)     TIA (transient ischemic attack)      Social History     Socioeconomic History    Marital status: Single     Spouse name: Not on file    Number of children: Not on file    Years of education: Not on file    Highest education level: Not on file   Occupational History    Not on file   Social Needs    Financial resource strain: Not on file    Food insecurity:     Worry: Not on file     Inability: Not on file    Transportation needs:     Medical: Not on file     Non-medical: Not on file   Tobacco Use    Smoking status: Former Smoker    Smokeless tobacco: Never Used   Substance and Sexual Activity    Alcohol use: No    Drug use: No    Sexual activity: Not on file   Lifestyle    Physical activity:     Days per week: Not on file     Minutes per session: Not on file    Stress: Not on file   Relationships    Social connections:     Talks on phone: Not on file     Gets together: Not on file     Attends Cheondoism service: Not on file     Active member of club or organization: Not on file     Attends meetings of clubs or organizations: Not on file     Relationship status: Not on file    Intimate partner violence:     Fear of current or ex partner: Not on file     Emotionally abused: Not on file     Physically abused: Not on file     Forced sexual activity: Not on file   Other Topics Concern    Not on file   Social History Narrative    Not on file      Family History   Problem Relation Age of Onset    Heart disease Mother      Past Surgical History:   Procedure Laterality Date    BACK SURGERY         Current Outpatient Medications:     amLODIPine (NORVASC) 2 5 mg tablet, TAKE ONE TABLET BY MOUTH TWICE DAILY , Disp: 180 tablet, Rfl: 3    atenolol (TENORMIN) 25 mg tablet, Take 1 tablet (25 mg total) by mouth daily, Disp: 90 tablet, Rfl: 3    Cholecalciferol 2000 units CAPS, Take 1 capsule by mouth, Disp: , Rfl:     cycloSPORINE (RESTASIS) 0 05 % ophthalmic emulsion, Apply 1 drop to eye, Disp: , Rfl:     HYDROmorphone (DILAUDID) 2 mg tablet, Take 2 mg by mouth 4 (four) times a day , Disp: , Rfl:     Multiple Vitamins tablet, Take 1 tablet by mouth daily, Disp: , Rfl:     ondansetron (ZOFRAN) 4 mg tablet, Take by mouth every 8 (eight) hours as needed , Disp: , Rfl:     lidocaine (LIDODERM) 5 %, Apply 1 patch topically daily Remove & Discard patch within 12 hours or as directed by MD (Patient not taking: Reported on 10/18/2019), Disp: 5 patch, Rfl: 0    polymyxin b-trimethoprim (POLYTRIM) ophthalmic solution, , Disp: , Rfl:   Allergies   Allergen Reactions    Iodine Shortness Of Breath    Buprenorphine GI Intolerance and Other (See Comments)    Hydrocodone-Acetaminophen      Vomiting    Hydrocodone-Acetaminophen      Vomiting  Vomiting    Iodinated Diagnostic Agents     Medical Tape  Metrizamide     Morphine     Naproxen GI Intolerance    Shellfish Allergy     Glucosamine Rash    Other Other (See Comments)     red raised areas - please use paper tape    Shellfish-Derived Products Rash       Review of Systems:  Review of Systems   Constitutional: Negative for appetite change and fatigue  Respiratory: Negative for shortness of breath  Cardiovascular: Negative for chest pain, palpitations and leg swelling  Gastrointestinal: Negative for blood in stool, constipation, diarrhea and nausea  Genitourinary: Negative for frequency  Musculoskeletal: Positive for arthralgias and back pain  Psychiatric/Behavioral: Negative for agitation, behavioral problems, confusion and decreased concentration  Physical Exam:  Vitals:    10/18/19 0933   BP: 120/70   BP Location: Left arm   Patient Position: Sitting   Cuff Size: Adult   Pulse: 78   SpO2: 98%   Weight: 58 5 kg (129 lb)   Height: 5' (1 524 m)       Physical Exam   Constitutional: She appears well-developed and well-nourished  No distress  HENT:   Head: Normocephalic and atraumatic  Mouth/Throat: Oropharynx is clear and moist  No oropharyngeal exudate  Eyes: Conjunctivae are normal  No scleral icterus  Neck: Neck supple  Normal carotid pulses and no JVD present  Carotid bruit is not present  No thyromegaly present  Cardiovascular: Normal rate, regular rhythm and intact distal pulses  Exam reveals no gallop and no friction rub  Murmur (grade 3 systolic murmur at apex) heard  Pulmonary/Chest: Breath sounds normal  She has no wheezes  She has no rhonchi  She has no rales  Abdominal: Soft  She exhibits no mass  There is no hepatosplenomegaly  There is no tenderness  Musculoskeletal: She exhibits no edema  Discussion/Summary:  1  PAF-in NSR today    On atenolol  Ivory Delacruz AC despite CVA  Thelda Farjinny No current palpitations  2  HTN-w/c on atenolol and amlodipine  3   MR-no more than moderate-should not be problematic      FU 6 months    Kirstie Glover MD

## 2019-10-28 DIAGNOSIS — I48.0 PAF (PAROXYSMAL ATRIAL FIBRILLATION) (HCC): ICD-10-CM

## 2019-10-28 DIAGNOSIS — I10 ESSENTIAL HYPERTENSION: ICD-10-CM

## 2019-10-28 RX ORDER — ATENOLOL 25 MG/1
TABLET ORAL
Qty: 90 TABLET | Refills: 2 | Status: SHIPPED | OUTPATIENT
Start: 2019-10-28 | End: 2020-06-06 | Stop reason: HOSPADM

## 2020-03-05 NOTE — MISCELLANEOUS
Message   Date: 20 Nov 2017 9:05 AM EST, Recorded By: Galindo Simeon For: Mario Kidd   Caller: Will Contreras, Self   Phone: [phone] (Home)   PC from patient who is having some spikes in her BP   163/93 and 154/87, 178/98  I went over her medications and she was to be taking Norvasc BID, and she "was unaware of that"  She never takes an evening dose  She is due back this month in Dec   I advised her to start taking the Norvasc as directed, keep track of her BP's, and call if they do not come down  I put her through to the  to make an appt  She will follow up with a phone call if the additional Norvasc does not work  Active Problems    1  Atrial fibrillation (427 31) (I48 91)   2  Benign essential hypertension (401 1) (I10)   3  Mitral valve insufficiency, acquired (424 0) (I34 0)   4  Osteoporosis (733 00) (M81 0)   5  TIA (transient ischemic attack) (435 9) (G45 9)    Current Meds   1  AmLODIPine Besylate 2 5 MG Oral Tablet; TAKE 1 TABLET TWICE DAILY; Therapy: [id number] to (Yuan Lyles)  Requested for: 10MXC1199; Last   Rx:[id number] Ordered   2  Atenolol 25 MG Oral Tablet; take one tablet by mouth daily; Therapy: 54Hqu1934 to (Evaluate:[id number])  Requested for: 58BMZ4238; Last   Rx:[id number] Ordered   3  Dilaudid 2 MG Oral Tablet (HYDROmorphone HCl); 1 tablet daily; Therapy: [id number] to Recorded   4  Ecotrin 325 MG Oral Tablet Delayed Release; Take 1 tablet twice daily Recorded   5  Multiple Vitamin TABS; TAKE 1 TABLET DAILY; Therapy: [id number] to Recorded   6  Zofran 4 MG Oral Tablet (Ondansetron HCl); Therapy: 06KJA7243 to Recorded    Allergies    1  Adhesive Tape MISC   2  Iodinated Contrast Media   3  Morphine Derivatives    4  IV Dye   5   Shellfish    Signatures   Electronically signed by : Ann Lopez, ; Nov 20 2017  9:11AM EST                       (Administrative) English

## 2020-03-11 DIAGNOSIS — I10 ESSENTIAL HYPERTENSION: ICD-10-CM

## 2020-03-11 RX ORDER — AMLODIPINE BESYLATE 2.5 MG/1
TABLET ORAL
Qty: 180 TABLET | Refills: 3 | Status: SHIPPED | OUTPATIENT
Start: 2020-03-11 | End: 2020-06-06 | Stop reason: HOSPADM

## 2020-04-24 ENCOUNTER — TELEPHONE (OUTPATIENT)
Dept: CARDIOLOGY CLINIC | Facility: CLINIC | Age: 85
End: 2020-04-24

## 2020-06-03 ENCOUNTER — HOSPITAL ENCOUNTER (INPATIENT)
Facility: HOSPITAL | Age: 85
LOS: 2 days | Discharge: HOME | DRG: 309 | End: 2020-06-06
Attending: EMERGENCY MEDICINE | Admitting: INTERNAL MEDICINE
Payer: MEDICARE

## 2020-06-03 ENCOUNTER — APPOINTMENT (EMERGENCY)
Dept: RADIOLOGY | Facility: HOSPITAL | Age: 85
DRG: 309 | End: 2020-06-03
Payer: MEDICARE

## 2020-06-03 ENCOUNTER — TELEPHONE (OUTPATIENT)
Dept: CARDIOLOGY CLINIC | Facility: CLINIC | Age: 85
End: 2020-06-03

## 2020-06-03 DIAGNOSIS — R00.2 PALPITATIONS: Primary | ICD-10-CM

## 2020-06-03 DIAGNOSIS — I48.91 ATRIAL FIBRILLATION (HCC): ICD-10-CM

## 2020-06-03 LAB
ALBUMIN SERPL BCP-MCNC: 3.6 G/DL (ref 3.5–5)
ALP SERPL-CCNC: 73 U/L (ref 46–116)
ALT SERPL W P-5'-P-CCNC: 26 U/L (ref 12–78)
ANION GAP SERPL CALCULATED.3IONS-SCNC: 5 MMOL/L (ref 4–13)
AST SERPL W P-5'-P-CCNC: 32 U/L (ref 5–45)
BASOPHILS # BLD AUTO: 0.03 THOUSANDS/ΜL (ref 0–0.1)
BASOPHILS NFR BLD AUTO: 0 % (ref 0–1)
BILIRUB SERPL-MCNC: 0.47 MG/DL (ref 0.2–1)
BUN SERPL-MCNC: 23 MG/DL (ref 5–25)
CALCIUM SERPL-MCNC: 8.8 MG/DL (ref 8.3–10.1)
CHLORIDE SERPL-SCNC: 103 MMOL/L (ref 100–108)
CO2 SERPL-SCNC: 30 MMOL/L (ref 21–32)
CREAT SERPL-MCNC: 0.8 MG/DL (ref 0.6–1.3)
EOSINOPHIL # BLD AUTO: 0.26 THOUSAND/ΜL (ref 0–0.61)
EOSINOPHIL NFR BLD AUTO: 3 % (ref 0–6)
ERYTHROCYTE [DISTWIDTH] IN BLOOD BY AUTOMATED COUNT: 13.3 % (ref 11.6–15.1)
GFR SERPL CREATININE-BSD FRML MDRD: 67 ML/MIN/1.73SQ M
GLUCOSE SERPL-MCNC: 152 MG/DL (ref 65–140)
HCT VFR BLD AUTO: 41.4 % (ref 34.8–46.1)
HGB BLD-MCNC: 13.4 G/DL (ref 11.5–15.4)
IMM GRANULOCYTES # BLD AUTO: 0.03 THOUSAND/UL (ref 0–0.2)
IMM GRANULOCYTES NFR BLD AUTO: 0 % (ref 0–2)
LYMPHOCYTES # BLD AUTO: 1.64 THOUSANDS/ΜL (ref 0.6–4.47)
LYMPHOCYTES NFR BLD AUTO: 20 % (ref 14–44)
MAGNESIUM SERPL-MCNC: 1.6 MG/DL (ref 1.6–2.6)
MCH RBC QN AUTO: 31.4 PG (ref 26.8–34.3)
MCHC RBC AUTO-ENTMCNC: 32.4 G/DL (ref 31.4–37.4)
MCV RBC AUTO: 97 FL (ref 82–98)
MONOCYTES # BLD AUTO: 0.58 THOUSAND/ΜL (ref 0.17–1.22)
MONOCYTES NFR BLD AUTO: 7 % (ref 4–12)
NEUTROPHILS # BLD AUTO: 5.69 THOUSANDS/ΜL (ref 1.85–7.62)
NEUTS SEG NFR BLD AUTO: 70 % (ref 43–75)
NRBC BLD AUTO-RTO: 0 /100 WBCS
PLATELET # BLD AUTO: 198 THOUSANDS/UL (ref 149–390)
PMV BLD AUTO: 9.3 FL (ref 8.9–12.7)
POTASSIUM SERPL-SCNC: 3.9 MMOL/L (ref 3.5–5.3)
PROT SERPL-MCNC: 7.7 G/DL (ref 6.4–8.2)
RBC # BLD AUTO: 4.27 MILLION/UL (ref 3.81–5.12)
SODIUM SERPL-SCNC: 138 MMOL/L (ref 136–145)
TROPONIN I SERPL-MCNC: <0.02 NG/ML
WBC # BLD AUTO: 8.23 THOUSAND/UL (ref 4.31–10.16)

## 2020-06-03 PROCEDURE — 96361 HYDRATE IV INFUSION ADD-ON: CPT

## 2020-06-03 PROCEDURE — 83735 ASSAY OF MAGNESIUM: CPT | Performed by: EMERGENCY MEDICINE

## 2020-06-03 PROCEDURE — 80053 COMPREHEN METABOLIC PANEL: CPT | Performed by: EMERGENCY MEDICINE

## 2020-06-03 PROCEDURE — 99285 EMERGENCY DEPT VISIT HI MDM: CPT | Performed by: EMERGENCY MEDICINE

## 2020-06-03 PROCEDURE — 71046 X-RAY EXAM CHEST 2 VIEWS: CPT

## 2020-06-03 PROCEDURE — 84484 ASSAY OF TROPONIN QUANT: CPT | Performed by: EMERGENCY MEDICINE

## 2020-06-03 PROCEDURE — 99220 PR INITIAL OBSERVATION CARE/DAY 70 MINUTES: CPT | Performed by: PHYSICIAN ASSISTANT

## 2020-06-03 PROCEDURE — 84484 ASSAY OF TROPONIN QUANT: CPT | Performed by: PHYSICIAN ASSISTANT

## 2020-06-03 PROCEDURE — 99285 EMERGENCY DEPT VISIT HI MDM: CPT

## 2020-06-03 PROCEDURE — 93005 ELECTROCARDIOGRAM TRACING: CPT

## 2020-06-03 PROCEDURE — 36415 COLL VENOUS BLD VENIPUNCTURE: CPT | Performed by: EMERGENCY MEDICINE

## 2020-06-03 PROCEDURE — 96374 THER/PROPH/DIAG INJ IV PUSH: CPT

## 2020-06-03 PROCEDURE — 85025 COMPLETE CBC W/AUTO DIFF WBC: CPT | Performed by: EMERGENCY MEDICINE

## 2020-06-03 RX ORDER — ATENOLOL 50 MG/1
25 TABLET ORAL DAILY
Status: DISCONTINUED | OUTPATIENT
Start: 2020-06-04 | End: 2020-06-04

## 2020-06-03 RX ORDER — ACETAMINOPHEN 325 MG/1
650 TABLET ORAL EVERY 6 HOURS PRN
Status: DISCONTINUED | OUTPATIENT
Start: 2020-06-03 | End: 2020-06-06 | Stop reason: HOSPADM

## 2020-06-03 RX ORDER — MELATONIN
2000 DAILY
Status: DISCONTINUED | OUTPATIENT
Start: 2020-06-04 | End: 2020-06-06 | Stop reason: HOSPADM

## 2020-06-03 RX ORDER — HYDROMORPHONE HYDROCHLORIDE 2 MG/1
2 TABLET ORAL 4 TIMES DAILY
Status: DISCONTINUED | OUTPATIENT
Start: 2020-06-03 | End: 2020-06-06 | Stop reason: HOSPADM

## 2020-06-03 RX ORDER — METOPROLOL TARTRATE 5 MG/5ML
5 INJECTION INTRAVENOUS ONCE
Status: COMPLETED | OUTPATIENT
Start: 2020-06-03 | End: 2020-06-03

## 2020-06-03 RX ORDER — METOPROLOL TARTRATE 5 MG/5ML
5 INJECTION INTRAVENOUS
Status: DISCONTINUED | OUTPATIENT
Start: 2020-06-03 | End: 2020-06-06 | Stop reason: HOSPADM

## 2020-06-03 RX ORDER — AMLODIPINE BESYLATE 2.5 MG/1
2.5 TABLET ORAL 2 TIMES DAILY
Status: DISCONTINUED | OUTPATIENT
Start: 2020-06-04 | End: 2020-06-05

## 2020-06-03 RX ORDER — CALCIUM CARBONATE 200(500)MG
1000 TABLET,CHEWABLE ORAL DAILY PRN
Status: DISCONTINUED | OUTPATIENT
Start: 2020-06-03 | End: 2020-06-06 | Stop reason: HOSPADM

## 2020-06-03 RX ORDER — ONDANSETRON 2 MG/ML
4 INJECTION INTRAMUSCULAR; INTRAVENOUS EVERY 6 HOURS PRN
Status: DISCONTINUED | OUTPATIENT
Start: 2020-06-03 | End: 2020-06-06 | Stop reason: HOSPADM

## 2020-06-03 RX ADMIN — GLYCERIN, HYPROMELLOSE, POLYETHYLENE GLYCOL 1 DROP: .2; .2; 1 LIQUID OPHTHALMIC at 23:37

## 2020-06-03 RX ADMIN — HYDROMORPHONE HYDROCHLORIDE 2 MG: 2 TABLET ORAL at 23:37

## 2020-06-03 RX ADMIN — METOPROLOL TARTRATE 5 MG: 1 INJECTION, SOLUTION INTRAVENOUS at 20:25

## 2020-06-03 RX ADMIN — SODIUM CHLORIDE 500 ML: 0.9 INJECTION, SOLUTION INTRAVENOUS at 20:25

## 2020-06-04 ENCOUNTER — APPOINTMENT (OUTPATIENT)
Dept: NON INVASIVE DIAGNOSTICS | Facility: HOSPITAL | Age: 85
DRG: 309 | End: 2020-06-04
Payer: MEDICARE

## 2020-06-04 PROBLEM — S22.000A THORACIC COMPRESSION FRACTURE (HCC): Status: ACTIVE | Noted: 2020-06-04

## 2020-06-04 LAB
ANION GAP SERPL CALCULATED.3IONS-SCNC: 9 MMOL/L (ref 4–13)
ATRIAL RATE: 241 BPM
BUN SERPL-MCNC: 18 MG/DL (ref 5–25)
CALCIUM SERPL-MCNC: 8.8 MG/DL (ref 8.3–10.1)
CHLORIDE SERPL-SCNC: 104 MMOL/L (ref 100–108)
CHOLEST SERPL-MCNC: 190 MG/DL (ref 50–200)
CO2 SERPL-SCNC: 25 MMOL/L (ref 21–32)
CREAT SERPL-MCNC: 0.71 MG/DL (ref 0.6–1.3)
ERYTHROCYTE [DISTWIDTH] IN BLOOD BY AUTOMATED COUNT: 13.4 % (ref 11.6–15.1)
GFR SERPL CREATININE-BSD FRML MDRD: 77 ML/MIN/1.73SQ M
GLUCOSE P FAST SERPL-MCNC: 105 MG/DL (ref 65–99)
GLUCOSE SERPL-MCNC: 105 MG/DL (ref 65–140)
HCT VFR BLD AUTO: 34.6 % (ref 34.8–46.1)
HDLC SERPL-MCNC: 68 MG/DL
HGB BLD-MCNC: 11.5 G/DL (ref 11.5–15.4)
INR PPP: 1.08 (ref 0.84–1.19)
LDLC SERPL CALC-MCNC: 107 MG/DL (ref 0–100)
MCH RBC QN AUTO: 31.7 PG (ref 26.8–34.3)
MCHC RBC AUTO-ENTMCNC: 33.2 G/DL (ref 31.4–37.4)
MCV RBC AUTO: 95 FL (ref 82–98)
PLATELET # BLD AUTO: 182 THOUSANDS/UL (ref 149–390)
PMV BLD AUTO: 9.1 FL (ref 8.9–12.7)
POTASSIUM SERPL-SCNC: 3.8 MMOL/L (ref 3.5–5.3)
PROTHROMBIN TIME: 14.1 SECONDS (ref 11.6–14.5)
QRS AXIS: 8 DEGREES
QRSD INTERVAL: 84 MS
QT INTERVAL: 320 MS
QTC INTERVAL: 433 MS
RBC # BLD AUTO: 3.63 MILLION/UL (ref 3.81–5.12)
SODIUM SERPL-SCNC: 138 MMOL/L (ref 136–145)
T WAVE AXIS: 60 DEGREES
TRIGL SERPL-MCNC: 76 MG/DL
TROPONIN I SERPL-MCNC: <0.02 NG/ML
TROPONIN I SERPL-MCNC: <0.02 NG/ML
TSH SERPL DL<=0.05 MIU/L-ACNC: 0.43 UIU/ML (ref 0.36–3.74)
VENTRICULAR RATE: 110 BPM
WBC # BLD AUTO: 7.82 THOUSAND/UL (ref 4.31–10.16)

## 2020-06-04 PROCEDURE — 97163 PT EVAL HIGH COMPLEX 45 MIN: CPT | Performed by: PHYSICAL THERAPIST

## 2020-06-04 PROCEDURE — 99222 1ST HOSP IP/OBS MODERATE 55: CPT | Performed by: INTERNAL MEDICINE

## 2020-06-04 PROCEDURE — 93306 TTE W/DOPPLER COMPLETE: CPT

## 2020-06-04 PROCEDURE — 85610 PROTHROMBIN TIME: CPT | Performed by: INTERNAL MEDICINE

## 2020-06-04 PROCEDURE — 85027 COMPLETE CBC AUTOMATED: CPT | Performed by: PHYSICIAN ASSISTANT

## 2020-06-04 PROCEDURE — 84443 ASSAY THYROID STIM HORMONE: CPT | Performed by: PHYSICIAN ASSISTANT

## 2020-06-04 PROCEDURE — 84484 ASSAY OF TROPONIN QUANT: CPT | Performed by: PHYSICIAN ASSISTANT

## 2020-06-04 PROCEDURE — 80048 BASIC METABOLIC PNL TOTAL CA: CPT | Performed by: PHYSICIAN ASSISTANT

## 2020-06-04 PROCEDURE — 80061 LIPID PANEL: CPT | Performed by: INTERNAL MEDICINE

## 2020-06-04 PROCEDURE — 93010 ELECTROCARDIOGRAM REPORT: CPT | Performed by: INTERNAL MEDICINE

## 2020-06-04 PROCEDURE — 99232 SBSQ HOSP IP/OBS MODERATE 35: CPT | Performed by: INTERNAL MEDICINE

## 2020-06-04 RX ORDER — WARFARIN SODIUM 5 MG/1
5 TABLET ORAL
Status: DISCONTINUED | OUTPATIENT
Start: 2020-06-04 | End: 2020-06-06 | Stop reason: DRUGHIGH

## 2020-06-04 RX ORDER — ATENOLOL 50 MG/1
25 TABLET ORAL ONCE
Status: COMPLETED | OUTPATIENT
Start: 2020-06-04 | End: 2020-06-04

## 2020-06-04 RX ORDER — ATENOLOL 50 MG/1
50 TABLET ORAL DAILY
Status: DISCONTINUED | OUTPATIENT
Start: 2020-06-05 | End: 2020-06-05

## 2020-06-04 RX ADMIN — AMLODIPINE BESYLATE 2.5 MG: 2.5 TABLET ORAL at 08:57

## 2020-06-04 RX ADMIN — GLYCERIN, HYPROMELLOSE, POLYETHYLENE GLYCOL 1 DROP: .2; .2; 1 LIQUID OPHTHALMIC at 08:59

## 2020-06-04 RX ADMIN — ATENOLOL 25 MG: 25 TABLET ORAL at 08:58

## 2020-06-04 RX ADMIN — ONDANSETRON 4 MG: 2 INJECTION INTRAMUSCULAR; INTRAVENOUS at 12:32

## 2020-06-04 RX ADMIN — HYDROMORPHONE HYDROCHLORIDE 2 MG: 2 TABLET ORAL at 12:30

## 2020-06-04 RX ADMIN — HYDROMORPHONE HYDROCHLORIDE 2 MG: 2 TABLET ORAL at 18:19

## 2020-06-04 RX ADMIN — HYDROMORPHONE HYDROCHLORIDE 2 MG: 2 TABLET ORAL at 08:55

## 2020-06-04 RX ADMIN — VITAMIN D, TAB 1000IU (100/BT) 2000 UNITS: 25 TAB at 08:57

## 2020-06-04 RX ADMIN — HYDROMORPHONE HYDROCHLORIDE 2 MG: 2 TABLET ORAL at 23:09

## 2020-06-04 RX ADMIN — GLYCERIN, HYPROMELLOSE, POLYETHYLENE GLYCOL 1 DROP: .2; .2; 1 LIQUID OPHTHALMIC at 23:10

## 2020-06-04 RX ADMIN — ENOXAPARIN SODIUM 40 MG: 40 INJECTION SUBCUTANEOUS at 08:59

## 2020-06-04 RX ADMIN — ATENOLOL 25 MG: 50 TABLET ORAL at 12:31

## 2020-06-05 ENCOUNTER — ANTICOAG VISIT (OUTPATIENT)
Dept: CARDIOLOGY CLINIC | Facility: CLINIC | Age: 85
End: 2020-06-05

## 2020-06-05 DIAGNOSIS — I48.0 PAROXYSMAL ATRIAL FIBRILLATION (HCC): ICD-10-CM

## 2020-06-05 LAB
INR PPP: 1.05 (ref 0.84–1.19)
PROTHROMBIN TIME: 13.8 SECONDS (ref 11.6–14.5)

## 2020-06-05 PROCEDURE — 97166 OT EVAL MOD COMPLEX 45 MIN: CPT

## 2020-06-05 PROCEDURE — 85610 PROTHROMBIN TIME: CPT | Performed by: INTERNAL MEDICINE

## 2020-06-05 PROCEDURE — 99232 SBSQ HOSP IP/OBS MODERATE 35: CPT | Performed by: INTERNAL MEDICINE

## 2020-06-05 RX ORDER — ATENOLOL 50 MG/1
75 TABLET ORAL DAILY
Status: DISCONTINUED | OUTPATIENT
Start: 2020-06-05 | End: 2020-06-06

## 2020-06-05 RX ADMIN — GLYCERIN, HYPROMELLOSE, POLYETHYLENE GLYCOL 1 DROP: .2; .2; 1 LIQUID OPHTHALMIC at 08:50

## 2020-06-05 RX ADMIN — GLYCERIN, HYPROMELLOSE, POLYETHYLENE GLYCOL 1 DROP: .2; .2; 1 LIQUID OPHTHALMIC at 23:46

## 2020-06-05 RX ADMIN — HYDROMORPHONE HYDROCHLORIDE 2 MG: 2 TABLET ORAL at 18:00

## 2020-06-05 RX ADMIN — ONDANSETRON 4 MG: 2 INJECTION INTRAMUSCULAR; INTRAVENOUS at 08:55

## 2020-06-05 RX ADMIN — HYDROMORPHONE HYDROCHLORIDE 2 MG: 2 TABLET ORAL at 23:47

## 2020-06-05 RX ADMIN — HYDROMORPHONE HYDROCHLORIDE 2 MG: 2 TABLET ORAL at 08:52

## 2020-06-05 RX ADMIN — VITAMIN D, TAB 1000IU (100/BT) 2000 UNITS: 25 TAB at 08:49

## 2020-06-05 RX ADMIN — ATENOLOL 75 MG: 50 TABLET ORAL at 08:50

## 2020-06-05 RX ADMIN — WARFARIN SODIUM 5 MG: 5 TABLET ORAL at 18:00

## 2020-06-05 RX ADMIN — HYDROMORPHONE HYDROCHLORIDE 2 MG: 2 TABLET ORAL at 13:39

## 2020-06-06 VITALS
HEART RATE: 88 BPM | TEMPERATURE: 98.4 F | DIASTOLIC BLOOD PRESSURE: 60 MMHG | WEIGHT: 126.1 LBS | OXYGEN SATURATION: 99 % | BODY MASS INDEX: 23.81 KG/M2 | SYSTOLIC BLOOD PRESSURE: 101 MMHG | RESPIRATION RATE: 20 BRPM | HEIGHT: 61 IN

## 2020-06-06 LAB
INR PPP: 1.05 (ref 0.84–1.19)
PROTHROMBIN TIME: 13.8 SECONDS (ref 11.6–14.5)
SARS-COV-2 RNA RESP QL NAA+PROBE: NEGATIVE

## 2020-06-06 PROCEDURE — 87635 SARS-COV-2 COVID-19 AMP PRB: CPT | Performed by: INTERNAL MEDICINE

## 2020-06-06 PROCEDURE — 85610 PROTHROMBIN TIME: CPT | Performed by: INTERNAL MEDICINE

## 2020-06-06 PROCEDURE — 99239 HOSP IP/OBS DSCHRG MGMT >30: CPT | Performed by: INTERNAL MEDICINE

## 2020-06-06 RX ORDER — WARFARIN SODIUM 2.5 MG/1
2.5 TABLET ORAL
Status: DISCONTINUED | OUTPATIENT
Start: 2020-06-06 | End: 2020-06-06 | Stop reason: HOSPADM

## 2020-06-06 RX ORDER — ATENOLOL 50 MG/1
50 TABLET ORAL DAILY
Qty: 30 TABLET | Refills: 2 | Status: SHIPPED | OUTPATIENT
Start: 2020-06-06 | End: 2020-06-13 | Stop reason: HOSPADM

## 2020-06-06 RX ORDER — ATENOLOL 50 MG/1
50 TABLET ORAL DAILY
Status: DISCONTINUED | OUTPATIENT
Start: 2020-06-06 | End: 2020-06-06 | Stop reason: HOSPADM

## 2020-06-06 RX ORDER — WARFARIN SODIUM 5 MG/1
2.5 TABLET ORAL
Qty: 30 TABLET | Refills: 2 | Status: SHIPPED | OUTPATIENT
Start: 2020-06-06 | End: 2020-06-22 | Stop reason: SDUPTHER

## 2020-06-06 RX ADMIN — HYDROMORPHONE HYDROCHLORIDE 2 MG: 2 TABLET ORAL at 18:37

## 2020-06-06 RX ADMIN — HYDROMORPHONE HYDROCHLORIDE 2 MG: 2 TABLET ORAL at 13:04

## 2020-06-06 RX ADMIN — WARFARIN SODIUM 2.5 MG: 2.5 TABLET ORAL at 18:37

## 2020-06-06 RX ADMIN — ONDANSETRON 4 MG: 2 INJECTION INTRAMUSCULAR; INTRAVENOUS at 13:08

## 2020-06-06 RX ADMIN — HYDROMORPHONE HYDROCHLORIDE 2 MG: 2 TABLET ORAL at 08:16

## 2020-06-06 RX ADMIN — GLYCERIN, HYPROMELLOSE, POLYETHYLENE GLYCOL 1 DROP: .2; .2; 1 LIQUID OPHTHALMIC at 08:16

## 2020-06-06 RX ADMIN — VITAMIN D, TAB 1000IU (100/BT) 2000 UNITS: 25 TAB at 08:17

## 2020-06-06 RX ADMIN — ATENOLOL 50 MG: 50 TABLET ORAL at 10:30

## 2020-06-08 ENCOUNTER — APPOINTMENT (EMERGENCY)
Dept: RADIOLOGY | Facility: HOSPITAL | Age: 85
DRG: 308 | End: 2020-06-08
Payer: MEDICARE

## 2020-06-08 ENCOUNTER — HOSPITAL ENCOUNTER (INPATIENT)
Facility: HOSPITAL | Age: 85
LOS: 5 days | Discharge: HOME | DRG: 308 | End: 2020-06-13
Attending: EMERGENCY MEDICINE | Admitting: FAMILY MEDICINE
Payer: MEDICARE

## 2020-06-08 ENCOUNTER — TELEPHONE (OUTPATIENT)
Dept: CARDIOLOGY CLINIC | Facility: CLINIC | Age: 85
End: 2020-06-08

## 2020-06-08 DIAGNOSIS — R79.89 ELEVATED BRAIN NATRIURETIC PEPTIDE (BNP) LEVEL: ICD-10-CM

## 2020-06-08 DIAGNOSIS — S32.010D CLOSED COMPRESSION FRACTURE OF L1 LUMBAR VERTEBRA WITH ROUTINE HEALING, SUBSEQUENT ENCOUNTER: ICD-10-CM

## 2020-06-08 DIAGNOSIS — I48.0 PAROXYSMAL ATRIAL FIBRILLATION (HCC): Primary | ICD-10-CM

## 2020-06-08 DIAGNOSIS — E87.1 HYPONATREMIA: ICD-10-CM

## 2020-06-08 DIAGNOSIS — I10 BENIGN ESSENTIAL HYPERTENSION: ICD-10-CM

## 2020-06-08 DIAGNOSIS — I50.9 CHF EXACERBATION (HCC): ICD-10-CM

## 2020-06-08 DIAGNOSIS — I48.91 ATRIAL FIBRILLATION (HCC): ICD-10-CM

## 2020-06-08 DIAGNOSIS — I50.32 CHRONIC DIASTOLIC (CONGESTIVE) HEART FAILURE (HCC): ICD-10-CM

## 2020-06-08 DIAGNOSIS — R07.9 CHEST PAIN: Primary | ICD-10-CM

## 2020-06-08 PROBLEM — E87.70 VOLUME OVERLOAD: Status: ACTIVE | Noted: 2020-06-08

## 2020-06-08 LAB
ALBUMIN SERPL BCP-MCNC: 3.5 G/DL (ref 3.5–5)
ALP SERPL-CCNC: 58 U/L (ref 46–116)
ALT SERPL W P-5'-P-CCNC: 38 U/L (ref 12–78)
ANION GAP SERPL CALCULATED.3IONS-SCNC: 9 MMOL/L (ref 4–13)
AST SERPL W P-5'-P-CCNC: 37 U/L (ref 5–45)
BASOPHILS # BLD AUTO: 0.02 THOUSANDS/ΜL (ref 0–0.1)
BASOPHILS NFR BLD AUTO: 0 % (ref 0–1)
BILIRUB SERPL-MCNC: 0.66 MG/DL (ref 0.2–1)
BUN SERPL-MCNC: 24 MG/DL (ref 5–25)
CALCIUM SERPL-MCNC: 9.2 MG/DL (ref 8.3–10.1)
CHLORIDE SERPL-SCNC: 101 MMOL/L (ref 100–108)
CO2 SERPL-SCNC: 23 MMOL/L (ref 21–32)
CREAT SERPL-MCNC: 0.97 MG/DL (ref 0.6–1.3)
EOSINOPHIL # BLD AUTO: 0.06 THOUSAND/ΜL (ref 0–0.61)
EOSINOPHIL NFR BLD AUTO: 1 % (ref 0–6)
ERYTHROCYTE [DISTWIDTH] IN BLOOD BY AUTOMATED COUNT: 13.8 % (ref 11.6–15.1)
GFR SERPL CREATININE-BSD FRML MDRD: 53 ML/MIN/1.73SQ M
GLUCOSE SERPL-MCNC: 156 MG/DL (ref 65–140)
HCT VFR BLD AUTO: 36.2 % (ref 34.8–46.1)
HGB BLD-MCNC: 12 G/DL (ref 11.5–15.4)
IMM GRANULOCYTES # BLD AUTO: 0.03 THOUSAND/UL (ref 0–0.2)
IMM GRANULOCYTES NFR BLD AUTO: 0 % (ref 0–2)
INR PPP: 1.78 (ref 0.84–1.19)
LYMPHOCYTES # BLD AUTO: 1.29 THOUSANDS/ΜL (ref 0.6–4.47)
LYMPHOCYTES NFR BLD AUTO: 13 % (ref 14–44)
MCH RBC QN AUTO: 32 PG (ref 26.8–34.3)
MCHC RBC AUTO-ENTMCNC: 33.1 G/DL (ref 31.4–37.4)
MCV RBC AUTO: 97 FL (ref 82–98)
MONOCYTES # BLD AUTO: 0.52 THOUSAND/ΜL (ref 0.17–1.22)
MONOCYTES NFR BLD AUTO: 5 % (ref 4–12)
NEUTROPHILS # BLD AUTO: 7.94 THOUSANDS/ΜL (ref 1.85–7.62)
NEUTS SEG NFR BLD AUTO: 81 % (ref 43–75)
NRBC BLD AUTO-RTO: 0 /100 WBCS
NT-PROBNP SERPL-MCNC: ABNORMAL PG/ML
PLATELET # BLD AUTO: 195 THOUSANDS/UL (ref 149–390)
PMV BLD AUTO: 9.6 FL (ref 8.9–12.7)
POTASSIUM SERPL-SCNC: 4.6 MMOL/L (ref 3.5–5.3)
PROT SERPL-MCNC: 7.4 G/DL (ref 6.4–8.2)
PROTHROMBIN TIME: 21 SECONDS (ref 11.6–14.5)
RBC # BLD AUTO: 3.75 MILLION/UL (ref 3.81–5.12)
SODIUM SERPL-SCNC: 133 MMOL/L (ref 136–145)
TROPONIN I SERPL-MCNC: <0.02 NG/ML
TROPONIN I SERPL-MCNC: <0.02 NG/ML
WBC # BLD AUTO: 9.86 THOUSAND/UL (ref 4.31–10.16)

## 2020-06-08 PROCEDURE — 85610 PROTHROMBIN TIME: CPT | Performed by: EMERGENCY MEDICINE

## 2020-06-08 PROCEDURE — 96375 TX/PRO/DX INJ NEW DRUG ADDON: CPT

## 2020-06-08 PROCEDURE — 99285 EMERGENCY DEPT VISIT HI MDM: CPT

## 2020-06-08 PROCEDURE — 71045 X-RAY EXAM CHEST 1 VIEW: CPT

## 2020-06-08 PROCEDURE — 96374 THER/PROPH/DIAG INJ IV PUSH: CPT

## 2020-06-08 PROCEDURE — 84484 ASSAY OF TROPONIN QUANT: CPT | Performed by: PHYSICIAN ASSISTANT

## 2020-06-08 PROCEDURE — 99285 EMERGENCY DEPT VISIT HI MDM: CPT | Performed by: EMERGENCY MEDICINE

## 2020-06-08 PROCEDURE — 83880 ASSAY OF NATRIURETIC PEPTIDE: CPT | Performed by: EMERGENCY MEDICINE

## 2020-06-08 PROCEDURE — 84484 ASSAY OF TROPONIN QUANT: CPT | Performed by: EMERGENCY MEDICINE

## 2020-06-08 PROCEDURE — 99223 1ST HOSP IP/OBS HIGH 75: CPT | Performed by: PHYSICIAN ASSISTANT

## 2020-06-08 PROCEDURE — 85025 COMPLETE CBC W/AUTO DIFF WBC: CPT | Performed by: EMERGENCY MEDICINE

## 2020-06-08 PROCEDURE — 36415 COLL VENOUS BLD VENIPUNCTURE: CPT

## 2020-06-08 PROCEDURE — 93005 ELECTROCARDIOGRAM TRACING: CPT

## 2020-06-08 PROCEDURE — 80053 COMPREHEN METABOLIC PANEL: CPT | Performed by: EMERGENCY MEDICINE

## 2020-06-08 RX ORDER — SENNOSIDES 8.6 MG
1 TABLET ORAL
Status: DISCONTINUED | OUTPATIENT
Start: 2020-06-08 | End: 2020-06-13 | Stop reason: HOSPADM

## 2020-06-08 RX ORDER — CALCIUM CARBONATE 200(500)MG
500 TABLET,CHEWABLE ORAL 3 TIMES DAILY PRN
Status: DISCONTINUED | OUTPATIENT
Start: 2020-06-08 | End: 2020-06-13 | Stop reason: HOSPADM

## 2020-06-08 RX ORDER — ONDANSETRON 2 MG/ML
4 INJECTION INTRAMUSCULAR; INTRAVENOUS EVERY 6 HOURS PRN
Status: DISCONTINUED | OUTPATIENT
Start: 2020-06-08 | End: 2020-06-13 | Stop reason: HOSPADM

## 2020-06-08 RX ORDER — HYDROMORPHONE HYDROCHLORIDE 2 MG/1
2 TABLET ORAL EVERY 6 HOURS PRN
Status: DISCONTINUED | OUTPATIENT
Start: 2020-06-08 | End: 2020-06-13 | Stop reason: HOSPADM

## 2020-06-08 RX ORDER — ATENOLOL 50 MG/1
50 TABLET ORAL DAILY
Status: DISCONTINUED | OUTPATIENT
Start: 2020-06-09 | End: 2020-06-09

## 2020-06-08 RX ORDER — WARFARIN SODIUM 2.5 MG/1
2.5 TABLET ORAL
Status: DISCONTINUED | OUTPATIENT
Start: 2020-06-08 | End: 2020-06-13 | Stop reason: HOSPADM

## 2020-06-08 RX ORDER — DIGOXIN 125 MCG
125 TABLET ORAL DAILY
Qty: 30 TABLET | Refills: 5 | Status: SHIPPED | OUTPATIENT
Start: 2020-06-08 | End: 2020-06-22 | Stop reason: ALTCHOICE

## 2020-06-08 RX ORDER — ACETAMINOPHEN 325 MG/1
650 TABLET ORAL EVERY 4 HOURS PRN
Status: DISCONTINUED | OUTPATIENT
Start: 2020-06-08 | End: 2020-06-13 | Stop reason: HOSPADM

## 2020-06-08 RX ORDER — ONDANSETRON 2 MG/ML
4 INJECTION INTRAMUSCULAR; INTRAVENOUS ONCE
Status: COMPLETED | OUTPATIENT
Start: 2020-06-08 | End: 2020-06-08

## 2020-06-08 RX ORDER — DIGOXIN 125 MCG
125 TABLET ORAL DAILY
Status: DISCONTINUED | OUTPATIENT
Start: 2020-06-09 | End: 2020-06-13 | Stop reason: HOSPADM

## 2020-06-08 RX ORDER — FUROSEMIDE 10 MG/ML
20 INJECTION INTRAMUSCULAR; INTRAVENOUS DAILY
Status: DISCONTINUED | OUTPATIENT
Start: 2020-06-09 | End: 2020-06-09

## 2020-06-08 RX ORDER — FUROSEMIDE 10 MG/ML
20 INJECTION INTRAMUSCULAR; INTRAVENOUS ONCE
Status: COMPLETED | OUTPATIENT
Start: 2020-06-08 | End: 2020-06-08

## 2020-06-08 RX ADMIN — FUROSEMIDE 20 MG: 10 INJECTION, SOLUTION INTRAMUSCULAR; INTRAVENOUS at 17:11

## 2020-06-08 RX ADMIN — WARFARIN SODIUM 2.5 MG: 2.5 TABLET ORAL at 21:25

## 2020-06-08 RX ADMIN — HYDROMORPHONE HYDROCHLORIDE 2 MG: 2 TABLET ORAL at 21:25

## 2020-06-08 RX ADMIN — ONDANSETRON 4 MG: 2 INJECTION INTRAMUSCULAR; INTRAVENOUS at 16:41

## 2020-06-08 RX ADMIN — GLYCERIN, HYPROMELLOSE, POLYETHYLENE GLYCOL 1 DROP: .2; .2; 1 LIQUID OPHTHALMIC at 21:26

## 2020-06-09 PROBLEM — F11.20 UNCOMPLICATED OPIOID DEPENDENCE (HCC): Status: ACTIVE | Noted: 2020-06-09

## 2020-06-09 PROBLEM — I50.33 ACUTE ON CHRONIC DIASTOLIC CHF (CONGESTIVE HEART FAILURE) (HCC): Status: ACTIVE | Noted: 2020-06-09

## 2020-06-09 LAB
ANION GAP SERPL CALCULATED.3IONS-SCNC: 9 MMOL/L (ref 4–13)
ATRIAL RATE: 326 BPM
BUN SERPL-MCNC: 27 MG/DL (ref 5–25)
CALCIUM SERPL-MCNC: 8.9 MG/DL (ref 8.3–10.1)
CHLORIDE SERPL-SCNC: 103 MMOL/L (ref 100–108)
CO2 SERPL-SCNC: 25 MMOL/L (ref 21–32)
CREAT SERPL-MCNC: 1.05 MG/DL (ref 0.6–1.3)
GFR SERPL CREATININE-BSD FRML MDRD: 48 ML/MIN/1.73SQ M
GLUCOSE SERPL-MCNC: 110 MG/DL (ref 65–140)
INR PPP: 2.07 (ref 0.84–1.19)
P AXIS: 228 DEGREES
POTASSIUM SERPL-SCNC: 4.3 MMOL/L (ref 3.5–5.3)
PROTHROMBIN TIME: 23.7 SECONDS (ref 11.6–14.5)
QRS AXIS: 19 DEGREES
QRSD INTERVAL: 84 MS
QT INTERVAL: 320 MS
QTC INTERVAL: 425 MS
SODIUM SERPL-SCNC: 137 MMOL/L (ref 136–145)
T WAVE AXIS: 33 DEGREES
TROPONIN I SERPL-MCNC: <0.02 NG/ML
VENTRICULAR RATE: 106 BPM

## 2020-06-09 PROCEDURE — 93010 ELECTROCARDIOGRAM REPORT: CPT | Performed by: INTERNAL MEDICINE

## 2020-06-09 PROCEDURE — 80048 BASIC METABOLIC PNL TOTAL CA: CPT | Performed by: FAMILY MEDICINE

## 2020-06-09 PROCEDURE — 99223 1ST HOSP IP/OBS HIGH 75: CPT | Performed by: INTERNAL MEDICINE

## 2020-06-09 PROCEDURE — 85610 PROTHROMBIN TIME: CPT | Performed by: PHYSICIAN ASSISTANT

## 2020-06-09 PROCEDURE — 84484 ASSAY OF TROPONIN QUANT: CPT | Performed by: PHYSICIAN ASSISTANT

## 2020-06-09 PROCEDURE — 99232 SBSQ HOSP IP/OBS MODERATE 35: CPT | Performed by: FAMILY MEDICINE

## 2020-06-09 RX ORDER — ATENOLOL 50 MG/1
50 TABLET ORAL 2 TIMES DAILY
Status: DISCONTINUED | OUTPATIENT
Start: 2020-06-10 | End: 2020-06-13 | Stop reason: HOSPADM

## 2020-06-09 RX ORDER — ATENOLOL 50 MG/1
75 TABLET ORAL DAILY
Status: DISCONTINUED | OUTPATIENT
Start: 2020-06-10 | End: 2020-06-09

## 2020-06-09 RX ORDER — ZOLPIDEM TARTRATE 5 MG/1
5 TABLET ORAL
Status: DISCONTINUED | OUTPATIENT
Start: 2020-06-09 | End: 2020-06-13 | Stop reason: HOSPADM

## 2020-06-09 RX ORDER — FUROSEMIDE 10 MG/ML
20 INJECTION INTRAMUSCULAR; INTRAVENOUS
Status: DISCONTINUED | OUTPATIENT
Start: 2020-06-09 | End: 2020-06-10

## 2020-06-09 RX ORDER — ATENOLOL 50 MG/1
75 TABLET ORAL DAILY
Status: DISCONTINUED | OUTPATIENT
Start: 2020-06-09 | End: 2020-06-09

## 2020-06-09 RX ORDER — ATENOLOL 50 MG/1
25 TABLET ORAL ONCE
Status: DISCONTINUED | OUTPATIENT
Start: 2020-06-09 | End: 2020-06-09

## 2020-06-09 RX ORDER — ATENOLOL 50 MG/1
25 TABLET ORAL ONCE
Status: COMPLETED | OUTPATIENT
Start: 2020-06-09 | End: 2020-06-09

## 2020-06-09 RX ADMIN — ATENOLOL 75 MG: 50 TABLET ORAL at 10:23

## 2020-06-09 RX ADMIN — ZOLPIDEM TARTRATE 5 MG: 5 TABLET, COATED ORAL at 22:06

## 2020-06-09 RX ADMIN — ATENOLOL 25 MG: 50 TABLET ORAL at 17:33

## 2020-06-09 RX ADMIN — HYDROMORPHONE HYDROCHLORIDE 2 MG: 2 TABLET ORAL at 10:25

## 2020-06-09 RX ADMIN — WARFARIN SODIUM 2.5 MG: 2.5 TABLET ORAL at 17:33

## 2020-06-09 RX ADMIN — DIGOXIN 125 MCG: 125 TABLET ORAL at 10:00

## 2020-06-09 RX ADMIN — HYDROMORPHONE HYDROCHLORIDE 2 MG: 2 TABLET ORAL at 16:25

## 2020-06-09 RX ADMIN — FUROSEMIDE 20 MG: 10 INJECTION, SOLUTION INTRAMUSCULAR; INTRAVENOUS at 16:09

## 2020-06-09 RX ADMIN — HYDROMORPHONE HYDROCHLORIDE 2 MG: 2 TABLET ORAL at 22:25

## 2020-06-09 RX ADMIN — FUROSEMIDE 20 MG: 10 INJECTION, SOLUTION INTRAMUSCULAR; INTRAVENOUS at 06:51

## 2020-06-10 ENCOUNTER — APPOINTMENT (INPATIENT)
Dept: RADIOLOGY | Facility: HOSPITAL | Age: 85
DRG: 308 | End: 2020-06-10
Payer: MEDICARE

## 2020-06-10 LAB
ANION GAP SERPL CALCULATED.3IONS-SCNC: 9 MMOL/L (ref 4–13)
BUN SERPL-MCNC: 34 MG/DL (ref 5–25)
CALCIUM SERPL-MCNC: 8.7 MG/DL (ref 8.3–10.1)
CHLORIDE SERPL-SCNC: 102 MMOL/L (ref 100–108)
CO2 SERPL-SCNC: 28 MMOL/L (ref 21–32)
CREAT SERPL-MCNC: 1.14 MG/DL (ref 0.6–1.3)
DIGOXIN SERPL-MCNC: 0.9 NG/ML (ref 0.8–2)
GFR SERPL CREATININE-BSD FRML MDRD: 43 ML/MIN/1.73SQ M
GLUCOSE SERPL-MCNC: 94 MG/DL (ref 65–140)
INR PPP: 2.17 (ref 0.84–1.19)
MAGNESIUM SERPL-MCNC: 2 MG/DL (ref 1.6–2.6)
NT-PROBNP SERPL-MCNC: ABNORMAL PG/ML
PHOSPHATE SERPL-MCNC: 4.1 MG/DL (ref 2.3–4.1)
POTASSIUM SERPL-SCNC: 3.6 MMOL/L (ref 3.5–5.3)
PROTHROMBIN TIME: 24.6 SECONDS (ref 11.6–14.5)
SODIUM SERPL-SCNC: 139 MMOL/L (ref 136–145)

## 2020-06-10 PROCEDURE — 84100 ASSAY OF PHOSPHORUS: CPT | Performed by: FAMILY MEDICINE

## 2020-06-10 PROCEDURE — 80048 BASIC METABOLIC PNL TOTAL CA: CPT | Performed by: FAMILY MEDICINE

## 2020-06-10 PROCEDURE — 83880 ASSAY OF NATRIURETIC PEPTIDE: CPT | Performed by: FAMILY MEDICINE

## 2020-06-10 PROCEDURE — 71045 X-RAY EXAM CHEST 1 VIEW: CPT

## 2020-06-10 PROCEDURE — 99232 SBSQ HOSP IP/OBS MODERATE 35: CPT | Performed by: INTERNAL MEDICINE

## 2020-06-10 PROCEDURE — 83735 ASSAY OF MAGNESIUM: CPT | Performed by: FAMILY MEDICINE

## 2020-06-10 PROCEDURE — 99232 SBSQ HOSP IP/OBS MODERATE 35: CPT | Performed by: FAMILY MEDICINE

## 2020-06-10 PROCEDURE — 80162 ASSAY OF DIGOXIN TOTAL: CPT | Performed by: PHYSICIAN ASSISTANT

## 2020-06-10 PROCEDURE — 85610 PROTHROMBIN TIME: CPT | Performed by: FAMILY MEDICINE

## 2020-06-10 RX ORDER — FUROSEMIDE 10 MG/ML
20 INJECTION INTRAMUSCULAR; INTRAVENOUS DAILY
Status: DISCONTINUED | OUTPATIENT
Start: 2020-06-11 | End: 2020-06-12

## 2020-06-10 RX ADMIN — HYDROMORPHONE HYDROCHLORIDE 2 MG: 2 TABLET ORAL at 15:52

## 2020-06-10 RX ADMIN — ZOLPIDEM TARTRATE 5 MG: 5 TABLET, COATED ORAL at 22:02

## 2020-06-10 RX ADMIN — GLYCERIN, HYPROMELLOSE, POLYETHYLENE GLYCOL 1 DROP: .2; .2; 1 LIQUID OPHTHALMIC at 22:01

## 2020-06-10 RX ADMIN — WARFARIN SODIUM 2.5 MG: 2.5 TABLET ORAL at 17:07

## 2020-06-10 RX ADMIN — ATENOLOL 50 MG: 50 TABLET ORAL at 17:07

## 2020-06-10 RX ADMIN — HYDROMORPHONE HYDROCHLORIDE 2 MG: 2 TABLET ORAL at 22:02

## 2020-06-10 RX ADMIN — HYDROMORPHONE HYDROCHLORIDE 2 MG: 2 TABLET ORAL at 09:40

## 2020-06-10 RX ADMIN — DIGOXIN 125 MCG: 125 TABLET ORAL at 09:32

## 2020-06-10 RX ADMIN — FUROSEMIDE 20 MG: 10 INJECTION, SOLUTION INTRAMUSCULAR; INTRAVENOUS at 09:33

## 2020-06-10 RX ADMIN — ATENOLOL 50 MG: 50 TABLET ORAL at 09:32

## 2020-06-11 LAB
ANION GAP SERPL CALCULATED.3IONS-SCNC: 6 MMOL/L (ref 4–13)
BUN SERPL-MCNC: 38 MG/DL (ref 5–25)
CALCIUM SERPL-MCNC: 8.5 MG/DL (ref 8.3–10.1)
CHLORIDE SERPL-SCNC: 105 MMOL/L (ref 100–108)
CO2 SERPL-SCNC: 31 MMOL/L (ref 21–32)
CREAT SERPL-MCNC: 1.08 MG/DL (ref 0.6–1.3)
GFR SERPL CREATININE-BSD FRML MDRD: 46 ML/MIN/1.73SQ M
GLUCOSE SERPL-MCNC: 94 MG/DL (ref 65–140)
INR PPP: 2.35 (ref 0.84–1.19)
MAGNESIUM SERPL-MCNC: 1.9 MG/DL (ref 1.6–2.6)
PHOSPHATE SERPL-MCNC: 3.8 MG/DL (ref 2.3–4.1)
POTASSIUM SERPL-SCNC: 3.9 MMOL/L (ref 3.5–5.3)
PROTHROMBIN TIME: 26.2 SECONDS (ref 11.6–14.5)
SODIUM SERPL-SCNC: 142 MMOL/L (ref 136–145)

## 2020-06-11 PROCEDURE — 85610 PROTHROMBIN TIME: CPT | Performed by: FAMILY MEDICINE

## 2020-06-11 PROCEDURE — 83735 ASSAY OF MAGNESIUM: CPT | Performed by: FAMILY MEDICINE

## 2020-06-11 PROCEDURE — 80048 BASIC METABOLIC PNL TOTAL CA: CPT | Performed by: FAMILY MEDICINE

## 2020-06-11 PROCEDURE — 84100 ASSAY OF PHOSPHORUS: CPT | Performed by: FAMILY MEDICINE

## 2020-06-11 PROCEDURE — 99232 SBSQ HOSP IP/OBS MODERATE 35: CPT | Performed by: FAMILY MEDICINE

## 2020-06-11 PROCEDURE — 99232 SBSQ HOSP IP/OBS MODERATE 35: CPT | Performed by: INTERNAL MEDICINE

## 2020-06-11 RX ADMIN — ZOLPIDEM TARTRATE 5 MG: 5 TABLET, COATED ORAL at 22:21

## 2020-06-11 RX ADMIN — GLYCERIN, HYPROMELLOSE, POLYETHYLENE GLYCOL 1 DROP: .2; .2; 1 LIQUID OPHTHALMIC at 22:21

## 2020-06-11 RX ADMIN — DICLOFENAC 2 G: 10 GEL TOPICAL at 22:21

## 2020-06-11 RX ADMIN — HYDROMORPHONE HYDROCHLORIDE 2 MG: 2 TABLET ORAL at 09:21

## 2020-06-11 RX ADMIN — HYDROMORPHONE HYDROCHLORIDE 2 MG: 2 TABLET ORAL at 22:21

## 2020-06-11 RX ADMIN — DIGOXIN 125 MCG: 125 TABLET ORAL at 09:22

## 2020-06-11 RX ADMIN — DICLOFENAC 2 G: 10 GEL TOPICAL at 11:51

## 2020-06-11 RX ADMIN — GLYCERIN, HYPROMELLOSE, POLYETHYLENE GLYCOL 1 DROP: .2; .2; 1 LIQUID OPHTHALMIC at 09:23

## 2020-06-11 RX ADMIN — DICLOFENAC 2 G: 10 GEL TOPICAL at 17:14

## 2020-06-11 RX ADMIN — ATENOLOL 50 MG: 50 TABLET ORAL at 09:22

## 2020-06-11 RX ADMIN — FUROSEMIDE 20 MG: 10 INJECTION, SOLUTION INTRAMUSCULAR; INTRAVENOUS at 09:20

## 2020-06-11 RX ADMIN — HYDROMORPHONE HYDROCHLORIDE 2 MG: 2 TABLET ORAL at 15:21

## 2020-06-11 RX ADMIN — ATENOLOL 50 MG: 50 TABLET ORAL at 17:14

## 2020-06-11 RX ADMIN — WARFARIN SODIUM 2.5 MG: 2.5 TABLET ORAL at 17:14

## 2020-06-12 ENCOUNTER — APPOINTMENT (INPATIENT)
Dept: NON INVASIVE DIAGNOSTICS | Facility: HOSPITAL | Age: 85
DRG: 308 | End: 2020-06-12
Attending: INTERNAL MEDICINE
Payer: MEDICARE

## 2020-06-12 ENCOUNTER — ANESTHESIA EVENT (INPATIENT)
Dept: NON INVASIVE DIAGNOSTICS | Facility: HOSPITAL | Age: 85
DRG: 308 | End: 2020-06-12
Payer: MEDICARE

## 2020-06-12 LAB
ANION GAP SERPL CALCULATED.3IONS-SCNC: 6 MMOL/L (ref 4–13)
BUN SERPL-MCNC: 39 MG/DL (ref 5–25)
CALCIUM SERPL-MCNC: 8.9 MG/DL (ref 8.3–10.1)
CHLORIDE SERPL-SCNC: 103 MMOL/L (ref 100–108)
CO2 SERPL-SCNC: 30 MMOL/L (ref 21–32)
CREAT SERPL-MCNC: 0.99 MG/DL (ref 0.6–1.3)
GFR SERPL CREATININE-BSD FRML MDRD: 51 ML/MIN/1.73SQ M
GLUCOSE SERPL-MCNC: 92 MG/DL (ref 65–140)
INR PPP: 2.55 (ref 0.84–1.19)
MAGNESIUM SERPL-MCNC: 2.1 MG/DL (ref 1.6–2.6)
PHOSPHATE SERPL-MCNC: 3.6 MG/DL (ref 2.3–4.1)
POTASSIUM SERPL-SCNC: 3.7 MMOL/L (ref 3.5–5.3)
PROTHROMBIN TIME: 28 SECONDS (ref 11.6–14.5)
SODIUM SERPL-SCNC: 139 MMOL/L (ref 136–145)

## 2020-06-12 PROCEDURE — 93312 ECHO TRANSESOPHAGEAL: CPT

## 2020-06-12 PROCEDURE — 80048 BASIC METABOLIC PNL TOTAL CA: CPT | Performed by: FAMILY MEDICINE

## 2020-06-12 PROCEDURE — 92960 CARDIOVERSION ELECTRIC EXT: CPT | Performed by: INTERNAL MEDICINE

## 2020-06-12 PROCEDURE — 5A2204Z RESTORATION OF CARDIAC RHYTHM, SINGLE: ICD-10-PCS | Performed by: INTERNAL MEDICINE

## 2020-06-12 PROCEDURE — 92960 CARDIOVERSION ELECTRIC EXT: CPT

## 2020-06-12 PROCEDURE — 93320 DOPPLER ECHO COMPLETE: CPT | Performed by: INTERNAL MEDICINE

## 2020-06-12 PROCEDURE — 93325 DOPPLER ECHO COLOR FLOW MAPG: CPT | Performed by: INTERNAL MEDICINE

## 2020-06-12 PROCEDURE — 85610 PROTHROMBIN TIME: CPT | Performed by: FAMILY MEDICINE

## 2020-06-12 PROCEDURE — 93312 ECHO TRANSESOPHAGEAL: CPT | Performed by: INTERNAL MEDICINE

## 2020-06-12 PROCEDURE — 83735 ASSAY OF MAGNESIUM: CPT | Performed by: FAMILY MEDICINE

## 2020-06-12 PROCEDURE — 99232 SBSQ HOSP IP/OBS MODERATE 35: CPT | Performed by: INTERNAL MEDICINE

## 2020-06-12 PROCEDURE — 93005 ELECTROCARDIOGRAM TRACING: CPT

## 2020-06-12 PROCEDURE — 84100 ASSAY OF PHOSPHORUS: CPT | Performed by: FAMILY MEDICINE

## 2020-06-12 PROCEDURE — 99232 SBSQ HOSP IP/OBS MODERATE 35: CPT | Performed by: FAMILY MEDICINE

## 2020-06-12 RX ORDER — LIDOCAINE HYDROCHLORIDE 20 MG/ML
INJECTION, SOLUTION INFILTRATION; PERINEURAL AS NEEDED
Status: DISCONTINUED | OUTPATIENT
Start: 2020-06-12 | End: 2020-06-12 | Stop reason: SURG

## 2020-06-12 RX ORDER — SODIUM CHLORIDE 9 MG/ML
50 INJECTION, SOLUTION INTRAVENOUS CONTINUOUS
Status: CANCELLED | OUTPATIENT
Start: 2020-06-12

## 2020-06-12 RX ORDER — FENTANYL CITRATE 50 UG/ML
INJECTION, SOLUTION INTRAMUSCULAR; INTRAVENOUS AS NEEDED
Status: DISCONTINUED | OUTPATIENT
Start: 2020-06-12 | End: 2020-06-12 | Stop reason: SURG

## 2020-06-12 RX ORDER — SODIUM CHLORIDE 9 MG/ML
INJECTION, SOLUTION INTRAVENOUS CONTINUOUS PRN
Status: DISCONTINUED | OUTPATIENT
Start: 2020-06-12 | End: 2020-06-12 | Stop reason: SURG

## 2020-06-12 RX ORDER — ONDANSETRON 2 MG/ML
4 INJECTION INTRAMUSCULAR; INTRAVENOUS EVERY 6 HOURS PRN
Status: DISCONTINUED | OUTPATIENT
Start: 2020-06-12 | End: 2020-06-12 | Stop reason: HOSPADM

## 2020-06-12 RX ORDER — POTASSIUM CHLORIDE 20 MEQ/1
20 TABLET, EXTENDED RELEASE ORAL ONCE
Status: COMPLETED | OUTPATIENT
Start: 2020-06-12 | End: 2020-06-12

## 2020-06-12 RX ORDER — FUROSEMIDE 20 MG/1
20 TABLET ORAL DAILY
Status: DISCONTINUED | OUTPATIENT
Start: 2020-06-13 | End: 2020-06-13 | Stop reason: HOSPADM

## 2020-06-12 RX ORDER — PROPOFOL 10 MG/ML
INJECTION, EMULSION INTRAVENOUS AS NEEDED
Status: DISCONTINUED | OUTPATIENT
Start: 2020-06-12 | End: 2020-06-12 | Stop reason: SURG

## 2020-06-12 RX ADMIN — FENTANYL CITRATE 25 MCG: 50 INJECTION, SOLUTION INTRAMUSCULAR; INTRAVENOUS at 16:00

## 2020-06-12 RX ADMIN — DIGOXIN 125 MCG: 125 TABLET ORAL at 08:11

## 2020-06-12 RX ADMIN — PROPOFOL 60 MG: 10 INJECTION, EMULSION INTRAVENOUS at 16:11

## 2020-06-12 RX ADMIN — LIDOCAINE HYDROCHLORIDE 2.5 ML: 20 INJECTION, SOLUTION INFILTRATION; PERINEURAL at 16:11

## 2020-06-12 RX ADMIN — DICLOFENAC 2 G: 10 GEL TOPICAL at 18:07

## 2020-06-12 RX ADMIN — PHENYLEPHRINE HYDROCHLORIDE 100 MCG: 10 INJECTION INTRAVENOUS at 16:23

## 2020-06-12 RX ADMIN — PHENYLEPHRINE HYDROCHLORIDE 100 MCG: 10 INJECTION INTRAVENOUS at 16:24

## 2020-06-12 RX ADMIN — FUROSEMIDE 20 MG: 10 INJECTION, SOLUTION INTRAMUSCULAR; INTRAVENOUS at 08:11

## 2020-06-12 RX ADMIN — ONDANSETRON 4 MG: 2 INJECTION INTRAMUSCULAR; INTRAVENOUS at 16:03

## 2020-06-12 RX ADMIN — HYDROMORPHONE HYDROCHLORIDE 2 MG: 2 TABLET ORAL at 18:09

## 2020-06-12 RX ADMIN — ATENOLOL 50 MG: 50 TABLET ORAL at 19:45

## 2020-06-12 RX ADMIN — POTASSIUM CHLORIDE 20 MEQ: 1500 TABLET, EXTENDED RELEASE ORAL at 18:10

## 2020-06-12 RX ADMIN — GLYCERIN, HYPROMELLOSE, POLYETHYLENE GLYCOL 1 DROP: .2; .2; 1 LIQUID OPHTHALMIC at 21:51

## 2020-06-12 RX ADMIN — ATENOLOL 50 MG: 50 TABLET ORAL at 08:11

## 2020-06-12 RX ADMIN — WARFARIN SODIUM 2.5 MG: 2.5 TABLET ORAL at 18:05

## 2020-06-12 RX ADMIN — FENTANYL CITRATE 50 MCG: 50 INJECTION, SOLUTION INTRAMUSCULAR; INTRAVENOUS at 16:06

## 2020-06-12 RX ADMIN — GLYCERIN, HYPROMELLOSE, POLYETHYLENE GLYCOL 1 DROP: .2; .2; 1 LIQUID OPHTHALMIC at 08:17

## 2020-06-12 RX ADMIN — ZOLPIDEM TARTRATE 5 MG: 5 TABLET, COATED ORAL at 23:16

## 2020-06-12 RX ADMIN — PROPOFOL 20 MG: 10 INJECTION, EMULSION INTRAVENOUS at 16:20

## 2020-06-12 RX ADMIN — PROPOFOL 10 MG: 10 INJECTION, EMULSION INTRAVENOUS at 16:36

## 2020-06-12 RX ADMIN — FENTANYL CITRATE 25 MCG: 50 INJECTION, SOLUTION INTRAMUSCULAR; INTRAVENOUS at 16:09

## 2020-06-12 RX ADMIN — PHENYLEPHRINE HYDROCHLORIDE 100 MCG: 10 INJECTION INTRAVENOUS at 16:20

## 2020-06-12 RX ADMIN — SODIUM CHLORIDE: 0.9 INJECTION, SOLUTION INTRAVENOUS at 15:59

## 2020-06-12 RX ADMIN — PROPOFOL 20 MG: 10 INJECTION, EMULSION INTRAVENOUS at 16:38

## 2020-06-12 RX ADMIN — DICLOFENAC 2 G: 10 GEL TOPICAL at 08:17

## 2020-06-13 VITALS
HEIGHT: 61 IN | DIASTOLIC BLOOD PRESSURE: 53 MMHG | BODY MASS INDEX: 22.89 KG/M2 | OXYGEN SATURATION: 94 % | WEIGHT: 121.25 LBS | RESPIRATION RATE: 18 BRPM | TEMPERATURE: 98.1 F | SYSTOLIC BLOOD PRESSURE: 108 MMHG | HEART RATE: 66 BPM

## 2020-06-13 PROBLEM — I50.32 CHRONIC DIASTOLIC (CONGESTIVE) HEART FAILURE (HCC): Status: ACTIVE | Noted: 2020-06-09

## 2020-06-13 LAB
ANION GAP SERPL CALCULATED.3IONS-SCNC: 4 MMOL/L (ref 4–13)
BUN SERPL-MCNC: 42 MG/DL (ref 5–25)
CALCIUM SERPL-MCNC: 8.6 MG/DL (ref 8.3–10.1)
CHLORIDE SERPL-SCNC: 104 MMOL/L (ref 100–108)
CO2 SERPL-SCNC: 32 MMOL/L (ref 21–32)
CREAT SERPL-MCNC: 1.14 MG/DL (ref 0.6–1.3)
GFR SERPL CREATININE-BSD FRML MDRD: 43 ML/MIN/1.73SQ M
GLUCOSE SERPL-MCNC: 92 MG/DL (ref 65–140)
INR PPP: 3.14 (ref 0.84–1.19)
MAGNESIUM SERPL-MCNC: 2.2 MG/DL (ref 1.6–2.6)
POTASSIUM SERPL-SCNC: 4.4 MMOL/L (ref 3.5–5.3)
PROTHROMBIN TIME: 33 SECONDS (ref 11.6–14.5)
SODIUM SERPL-SCNC: 140 MMOL/L (ref 136–145)

## 2020-06-13 PROCEDURE — 80048 BASIC METABOLIC PNL TOTAL CA: CPT | Performed by: FAMILY MEDICINE

## 2020-06-13 PROCEDURE — 99232 SBSQ HOSP IP/OBS MODERATE 35: CPT | Performed by: INTERNAL MEDICINE

## 2020-06-13 PROCEDURE — 83735 ASSAY OF MAGNESIUM: CPT | Performed by: FAMILY MEDICINE

## 2020-06-13 PROCEDURE — 85610 PROTHROMBIN TIME: CPT | Performed by: FAMILY MEDICINE

## 2020-06-13 PROCEDURE — 99238 HOSP IP/OBS DSCHRG MGMT 30/<: CPT | Performed by: FAMILY MEDICINE

## 2020-06-13 RX ORDER — ATENOLOL 50 MG/1
50 TABLET ORAL 2 TIMES DAILY
Qty: 60 TABLET | Refills: 5 | Status: SHIPPED | OUTPATIENT
Start: 2020-06-13 | End: 2020-06-22 | Stop reason: SDUPTHER

## 2020-06-13 RX ORDER — FUROSEMIDE 20 MG/1
20 TABLET ORAL DAILY
Qty: 30 TABLET | Refills: 5 | Status: SHIPPED | OUTPATIENT
Start: 2020-06-14 | End: 2020-06-22 | Stop reason: SDUPTHER

## 2020-06-13 RX ADMIN — WARFARIN SODIUM 2.5 MG: 2.5 TABLET ORAL at 17:55

## 2020-06-13 RX ADMIN — DICLOFENAC 2 G: 10 GEL TOPICAL at 08:30

## 2020-06-13 RX ADMIN — ATENOLOL 50 MG: 50 TABLET ORAL at 08:29

## 2020-06-13 RX ADMIN — DIGOXIN 125 MCG: 125 TABLET ORAL at 08:29

## 2020-06-13 RX ADMIN — HYDROMORPHONE HYDROCHLORIDE 2 MG: 2 TABLET ORAL at 14:57

## 2020-06-13 RX ADMIN — FUROSEMIDE 20 MG: 20 TABLET ORAL at 08:29

## 2020-06-13 RX ADMIN — HYDROMORPHONE HYDROCHLORIDE 2 MG: 2 TABLET ORAL at 08:30

## 2020-06-13 RX ADMIN — GLYCERIN, HYPROMELLOSE, POLYETHYLENE GLYCOL 1 DROP: .2; .2; 1 LIQUID OPHTHALMIC at 08:30

## 2020-06-15 ENCOUNTER — EPISODE CHANGES (OUTPATIENT)
Dept: CASE MANAGEMENT | Facility: OTHER | Age: 85
End: 2020-06-15

## 2020-06-15 LAB
ATRIAL RATE: 59 BPM
P AXIS: 20 DEGREES
PR INTERVAL: 178 MS
QRS AXIS: -12 DEGREES
QRSD INTERVAL: 82 MS
QT INTERVAL: 422 MS
QTC INTERVAL: 417 MS
T WAVE AXIS: 7 DEGREES
VENTRICULAR RATE: 59 BPM

## 2020-06-15 PROCEDURE — 93010 ELECTROCARDIOGRAM REPORT: CPT | Performed by: INTERNAL MEDICINE

## 2020-06-16 ENCOUNTER — APPOINTMENT (OUTPATIENT)
Dept: LAB | Age: 85
End: 2020-06-16
Payer: MEDICARE

## 2020-06-16 ENCOUNTER — TELEPHONE (OUTPATIENT)
Dept: LAB | Facility: HOSPITAL | Age: 85
End: 2020-06-16

## 2020-06-16 ENCOUNTER — TELEPHONE (OUTPATIENT)
Dept: CARDIOLOGY CLINIC | Facility: CLINIC | Age: 85
End: 2020-06-16

## 2020-06-16 ENCOUNTER — PATIENT OUTREACH (OUTPATIENT)
Dept: CASE MANAGEMENT | Facility: OTHER | Age: 85
End: 2020-06-16

## 2020-06-16 DIAGNOSIS — I48.91 ATRIAL FIBRILLATION, UNSPECIFIED TYPE (HCC): Primary | ICD-10-CM

## 2020-06-16 DIAGNOSIS — I50.9 CHF EXACERBATION (HCC): ICD-10-CM

## 2020-06-16 DIAGNOSIS — E87.1 HYPONATREMIA: ICD-10-CM

## 2020-06-16 LAB
ANION GAP SERPL CALCULATED.3IONS-SCNC: 4 MMOL/L (ref 4–13)
BASOPHILS # BLD AUTO: 0.03 THOUSANDS/ΜL (ref 0–0.1)
BASOPHILS NFR BLD AUTO: 1 % (ref 0–1)
BUN SERPL-MCNC: 25 MG/DL (ref 5–25)
CALCIUM SERPL-MCNC: 9.3 MG/DL (ref 8.3–10.1)
CHLORIDE SERPL-SCNC: 104 MMOL/L (ref 100–108)
CO2 SERPL-SCNC: 28 MMOL/L (ref 21–32)
CREAT SERPL-MCNC: 0.87 MG/DL (ref 0.6–1.3)
EOSINOPHIL # BLD AUTO: 0.38 THOUSAND/ΜL (ref 0–0.61)
EOSINOPHIL NFR BLD AUTO: 6 % (ref 0–6)
ERYTHROCYTE [DISTWIDTH] IN BLOOD BY AUTOMATED COUNT: 14.1 % (ref 11.6–15.1)
GFR SERPL CREATININE-BSD FRML MDRD: 60 ML/MIN/1.73SQ M
GLUCOSE SERPL-MCNC: 95 MG/DL (ref 65–140)
HCT VFR BLD AUTO: 38.9 % (ref 34.8–46.1)
HGB BLD-MCNC: 12.5 G/DL (ref 11.5–15.4)
IMM GRANULOCYTES # BLD AUTO: 0.03 THOUSAND/UL (ref 0–0.2)
IMM GRANULOCYTES NFR BLD AUTO: 1 % (ref 0–2)
LYMPHOCYTES # BLD AUTO: 1.69 THOUSANDS/ΜL (ref 0.6–4.47)
LYMPHOCYTES NFR BLD AUTO: 27 % (ref 14–44)
MCH RBC QN AUTO: 31.3 PG (ref 26.8–34.3)
MCHC RBC AUTO-ENTMCNC: 32.1 G/DL (ref 31.4–37.4)
MCV RBC AUTO: 97 FL (ref 82–98)
MONOCYTES # BLD AUTO: 0.62 THOUSAND/ΜL (ref 0.17–1.22)
MONOCYTES NFR BLD AUTO: 10 % (ref 4–12)
NEUTROPHILS # BLD AUTO: 3.58 THOUSANDS/ΜL (ref 1.85–7.62)
NEUTS SEG NFR BLD AUTO: 55 % (ref 43–75)
NRBC BLD AUTO-RTO: 0 /100 WBCS
PLATELET # BLD AUTO: 238 THOUSANDS/UL (ref 149–390)
PMV BLD AUTO: 9.8 FL (ref 8.9–12.7)
POTASSIUM SERPL-SCNC: 4.2 MMOL/L (ref 3.5–5.3)
RBC # BLD AUTO: 4 MILLION/UL (ref 3.81–5.12)
SODIUM SERPL-SCNC: 136 MMOL/L (ref 136–145)
WBC # BLD AUTO: 6.33 THOUSAND/UL (ref 4.31–10.16)

## 2020-06-16 PROCEDURE — 36415 COLL VENOUS BLD VENIPUNCTURE: CPT

## 2020-06-16 PROCEDURE — 80048 BASIC METABOLIC PNL TOTAL CA: CPT

## 2020-06-16 PROCEDURE — 85025 COMPLETE CBC W/AUTO DIFF WBC: CPT

## 2020-06-18 ENCOUNTER — ANTICOAG VISIT (OUTPATIENT)
Dept: CARDIOLOGY CLINIC | Facility: CLINIC | Age: 85
End: 2020-06-18

## 2020-06-18 ENCOUNTER — APPOINTMENT (OUTPATIENT)
Dept: LAB | Facility: HOSPITAL | Age: 85
End: 2020-06-18
Attending: INTERNAL MEDICINE
Payer: MEDICARE

## 2020-06-18 DIAGNOSIS — I48.91 ATRIAL FIBRILLATION, UNSPECIFIED TYPE (HCC): ICD-10-CM

## 2020-06-18 LAB
INR PPP: 3.17 (ref 0.84–1.19)
PROTHROMBIN TIME: 32 SECONDS (ref 11.6–14.5)

## 2020-06-18 PROCEDURE — 85610 PROTHROMBIN TIME: CPT

## 2020-06-18 PROCEDURE — 36415 COLL VENOUS BLD VENIPUNCTURE: CPT

## 2020-06-22 ENCOUNTER — OFFICE VISIT (OUTPATIENT)
Dept: CARDIOLOGY CLINIC | Facility: CLINIC | Age: 85
End: 2020-06-22
Payer: MEDICARE

## 2020-06-22 VITALS
WEIGHT: 123 LBS | BODY MASS INDEX: 23.22 KG/M2 | HEART RATE: 59 BPM | DIASTOLIC BLOOD PRESSURE: 82 MMHG | SYSTOLIC BLOOD PRESSURE: 140 MMHG | HEIGHT: 61 IN

## 2020-06-22 DIAGNOSIS — I10 BENIGN ESSENTIAL HYPERTENSION: ICD-10-CM

## 2020-06-22 DIAGNOSIS — I48.0 PAROXYSMAL ATRIAL FIBRILLATION (HCC): Primary | ICD-10-CM

## 2020-06-22 DIAGNOSIS — I34.0 MITRAL VALVE INSUFFICIENCY, ACQUIRED: ICD-10-CM

## 2020-06-22 DIAGNOSIS — I50.32 CHRONIC DIASTOLIC (CONGESTIVE) HEART FAILURE (HCC): ICD-10-CM

## 2020-06-22 PROCEDURE — 93000 ELECTROCARDIOGRAM COMPLETE: CPT | Performed by: INTERNAL MEDICINE

## 2020-06-22 PROCEDURE — 1036F TOBACCO NON-USER: CPT | Performed by: INTERNAL MEDICINE

## 2020-06-22 PROCEDURE — 1111F DSCHRG MED/CURRENT MED MERGE: CPT | Performed by: INTERNAL MEDICINE

## 2020-06-22 PROCEDURE — 3008F BODY MASS INDEX DOCD: CPT | Performed by: INTERNAL MEDICINE

## 2020-06-22 PROCEDURE — 3077F SYST BP >= 140 MM HG: CPT | Performed by: INTERNAL MEDICINE

## 2020-06-22 PROCEDURE — 99213 OFFICE O/P EST LOW 20 MIN: CPT | Performed by: INTERNAL MEDICINE

## 2020-06-22 PROCEDURE — 3079F DIAST BP 80-89 MM HG: CPT | Performed by: INTERNAL MEDICINE

## 2020-06-22 RX ORDER — FUROSEMIDE 20 MG/1
20 TABLET ORAL EVERY OTHER DAY
Qty: 30 TABLET | Refills: 5
Start: 2020-06-22 | End: 2020-06-22 | Stop reason: SDUPTHER

## 2020-06-22 RX ORDER — WARFARIN SODIUM 2.5 MG/1
2.5 TABLET ORAL
Qty: 30 TABLET | Refills: 5
Start: 2020-06-22 | End: 2021-06-21 | Stop reason: HOSPADM

## 2020-06-22 RX ORDER — WARFARIN SODIUM 2.5 MG/1
2.5 TABLET ORAL
Qty: 30 TABLET | Refills: 5 | Status: SHIPPED | OUTPATIENT
Start: 2020-06-22 | End: 2020-06-22 | Stop reason: SDUPTHER

## 2020-06-22 RX ORDER — FUROSEMIDE 20 MG/1
20 TABLET ORAL EVERY OTHER DAY
Qty: 30 TABLET | Refills: 5
Start: 2020-06-22 | End: 2020-07-13 | Stop reason: SDUPTHER

## 2020-06-22 RX ORDER — ATENOLOL 50 MG/1
50 TABLET ORAL DAILY
Start: 2020-06-22 | End: 2020-11-13 | Stop reason: SDUPTHER

## 2020-06-24 ENCOUNTER — OFFICE VISIT (OUTPATIENT)
Dept: FAMILY MEDICINE CLINIC | Facility: CLINIC | Age: 85
End: 2020-06-24
Payer: MEDICARE

## 2020-06-24 VITALS
SYSTOLIC BLOOD PRESSURE: 126 MMHG | HEART RATE: 64 BPM | DIASTOLIC BLOOD PRESSURE: 82 MMHG | BODY MASS INDEX: 23.03 KG/M2 | WEIGHT: 122 LBS | OXYGEN SATURATION: 95 % | HEIGHT: 61 IN

## 2020-06-24 DIAGNOSIS — I10 BENIGN ESSENTIAL HYPERTENSION: ICD-10-CM

## 2020-06-24 DIAGNOSIS — I48.0 PAROXYSMAL ATRIAL FIBRILLATION (HCC): ICD-10-CM

## 2020-06-24 DIAGNOSIS — Z00.00 MEDICARE ANNUAL WELLNESS VISIT, SUBSEQUENT: ICD-10-CM

## 2020-06-24 DIAGNOSIS — F11.20 UNCOMPLICATED OPIOID DEPENDENCE (HCC): ICD-10-CM

## 2020-06-24 DIAGNOSIS — I50.32 CHRONIC DIASTOLIC (CONGESTIVE) HEART FAILURE (HCC): Primary | ICD-10-CM

## 2020-06-24 PROBLEM — N81.2 UTEROVAGINAL PROLAPSE, INCOMPLETE: Status: ACTIVE | Noted: 2017-10-02

## 2020-06-24 PROBLEM — N95.0 POST-MENOPAUSE BLEEDING: Status: ACTIVE | Noted: 2017-10-02

## 2020-06-24 PROBLEM — Z79.891 CHRONICALLY ON OPIATE THERAPY: Status: ACTIVE | Noted: 2018-09-06

## 2020-06-24 PROCEDURE — 99204 OFFICE O/P NEW MOD 45 MIN: CPT | Performed by: FAMILY MEDICINE

## 2020-06-24 PROCEDURE — 1036F TOBACCO NON-USER: CPT | Performed by: FAMILY MEDICINE

## 2020-06-24 PROCEDURE — 1111F DSCHRG MED/CURRENT MED MERGE: CPT | Performed by: FAMILY MEDICINE

## 2020-06-24 PROCEDURE — 1170F FXNL STATUS ASSESSED: CPT | Performed by: FAMILY MEDICINE

## 2020-06-24 PROCEDURE — 1125F AMNT PAIN NOTED PAIN PRSNT: CPT | Performed by: FAMILY MEDICINE

## 2020-06-24 PROCEDURE — 3074F SYST BP LT 130 MM HG: CPT | Performed by: FAMILY MEDICINE

## 2020-06-24 PROCEDURE — G0438 PPPS, INITIAL VISIT: HCPCS | Performed by: FAMILY MEDICINE

## 2020-06-24 PROCEDURE — 3008F BODY MASS INDEX DOCD: CPT | Performed by: FAMILY MEDICINE

## 2020-06-24 PROCEDURE — 1160F RVW MEDS BY RX/DR IN RCRD: CPT | Performed by: FAMILY MEDICINE

## 2020-06-24 PROCEDURE — 3079F DIAST BP 80-89 MM HG: CPT | Performed by: FAMILY MEDICINE

## 2020-06-25 ENCOUNTER — APPOINTMENT (OUTPATIENT)
Dept: LAB | Facility: HOSPITAL | Age: 85
End: 2020-06-25
Payer: MEDICARE

## 2020-06-25 ENCOUNTER — ANTICOAG VISIT (OUTPATIENT)
Dept: CARDIOLOGY CLINIC | Facility: CLINIC | Age: 85
End: 2020-06-25

## 2020-06-25 DIAGNOSIS — I48.91 ATRIAL FIBRILLATION, UNSPECIFIED TYPE (HCC): ICD-10-CM

## 2020-06-25 LAB
INR PPP: 2.9 (ref 0.84–1.19)
PROTHROMBIN TIME: 30.1 SECONDS (ref 11.6–14.5)

## 2020-06-25 PROCEDURE — 36415 COLL VENOUS BLD VENIPUNCTURE: CPT

## 2020-06-25 PROCEDURE — 85610 PROTHROMBIN TIME: CPT

## 2020-06-26 ENCOUNTER — TELEPHONE (OUTPATIENT)
Dept: CARDIOLOGY CLINIC | Facility: CLINIC | Age: 85
End: 2020-06-26

## 2020-07-02 ENCOUNTER — APPOINTMENT (OUTPATIENT)
Dept: LAB | Facility: HOSPITAL | Age: 85
End: 2020-07-02
Payer: MEDICARE

## 2020-07-02 ENCOUNTER — ANTICOAG VISIT (OUTPATIENT)
Dept: CARDIOLOGY CLINIC | Facility: CLINIC | Age: 85
End: 2020-07-02

## 2020-07-02 DIAGNOSIS — I48.91 ATRIAL FIBRILLATION, UNSPECIFIED TYPE (HCC): ICD-10-CM

## 2020-07-02 LAB
INR PPP: 2.64 (ref 0.84–1.19)
PROTHROMBIN TIME: 28.1 SECONDS (ref 11.6–14.5)

## 2020-07-02 PROCEDURE — 85610 PROTHROMBIN TIME: CPT

## 2020-07-02 PROCEDURE — 36415 COLL VENOUS BLD VENIPUNCTURE: CPT

## 2020-07-10 ENCOUNTER — TELEPHONE (OUTPATIENT)
Dept: OTHER | Facility: OTHER | Age: 85
End: 2020-07-10

## 2020-07-13 ENCOUNTER — APPOINTMENT (OUTPATIENT)
Dept: LAB | Facility: HOSPITAL | Age: 85
End: 2020-07-13
Payer: MEDICARE

## 2020-07-13 ENCOUNTER — ANTICOAG VISIT (OUTPATIENT)
Dept: CARDIOLOGY CLINIC | Facility: CLINIC | Age: 85
End: 2020-07-13

## 2020-07-13 ENCOUNTER — OFFICE VISIT (OUTPATIENT)
Dept: CARDIOLOGY CLINIC | Facility: CLINIC | Age: 85
End: 2020-07-13
Payer: MEDICARE

## 2020-07-13 VITALS
DIASTOLIC BLOOD PRESSURE: 70 MMHG | WEIGHT: 123 LBS | BODY MASS INDEX: 23.24 KG/M2 | SYSTOLIC BLOOD PRESSURE: 140 MMHG | HEART RATE: 76 BPM | TEMPERATURE: 96.9 F

## 2020-07-13 DIAGNOSIS — I48.0 PAROXYSMAL ATRIAL FIBRILLATION (HCC): ICD-10-CM

## 2020-07-13 DIAGNOSIS — I10 BENIGN ESSENTIAL HYPERTENSION: ICD-10-CM

## 2020-07-13 DIAGNOSIS — I48.91 ATRIAL FIBRILLATION, UNSPECIFIED TYPE (HCC): ICD-10-CM

## 2020-07-13 DIAGNOSIS — R06.00 DYSPNEA, UNSPECIFIED TYPE: Primary | ICD-10-CM

## 2020-07-13 DIAGNOSIS — I34.0 MITRAL VALVE INSUFFICIENCY, ACQUIRED: ICD-10-CM

## 2020-07-13 DIAGNOSIS — I50.32 CHRONIC DIASTOLIC (CONGESTIVE) HEART FAILURE (HCC): ICD-10-CM

## 2020-07-13 LAB
INR PPP: 2.97 (ref 0.84–1.19)
PROTHROMBIN TIME: 30.7 SECONDS (ref 11.6–14.5)

## 2020-07-13 PROCEDURE — 99214 OFFICE O/P EST MOD 30 MIN: CPT | Performed by: INTERNAL MEDICINE

## 2020-07-13 PROCEDURE — 85610 PROTHROMBIN TIME: CPT

## 2020-07-13 PROCEDURE — 1170F FXNL STATUS ASSESSED: CPT | Performed by: INTERNAL MEDICINE

## 2020-07-13 PROCEDURE — 1036F TOBACCO NON-USER: CPT | Performed by: INTERNAL MEDICINE

## 2020-07-13 PROCEDURE — 36415 COLL VENOUS BLD VENIPUNCTURE: CPT

## 2020-07-13 PROCEDURE — 1111F DSCHRG MED/CURRENT MED MERGE: CPT | Performed by: INTERNAL MEDICINE

## 2020-07-13 PROCEDURE — 3077F SYST BP >= 140 MM HG: CPT | Performed by: INTERNAL MEDICINE

## 2020-07-13 PROCEDURE — 1160F RVW MEDS BY RX/DR IN RCRD: CPT | Performed by: INTERNAL MEDICINE

## 2020-07-13 PROCEDURE — 3078F DIAST BP <80 MM HG: CPT | Performed by: INTERNAL MEDICINE

## 2020-07-13 RX ORDER — FUROSEMIDE 20 MG/1
20 TABLET ORAL DAILY PRN
Qty: 90 TABLET | Refills: 3 | Status: SHIPPED | OUTPATIENT
Start: 2020-07-13

## 2020-07-13 RX ORDER — AMLODIPINE BESYLATE 2.5 MG/1
2.5 TABLET ORAL DAILY
Qty: 90 TABLET | Refills: 3
Start: 2020-07-13 | End: 2021-07-06

## 2020-07-13 NOTE — PROGRESS NOTES
Cardiology Follow Up    Geno Search  7/20/1932  [id number]  [address] [facility] [address] [phone]    Reason for visit:  Patient  Time for elevated blood pressure and somewhat atypical dyspnea  Has no mitral valve prolapse with mitral regurgitation, paroxysmal atrial fibrillation, known essential hypertension and chronic diastolic heart failure      1  Paroxysmal atrial fibrillation (HCC)     2  Benign essential hypertension     3  Chronic diastolic (congestive) heart failure ([facility] )     4  TIA (transient ischemic attack)     5  Dyspnea, unspecified type         Interval History: The patient noticed a vague heaviness and perhaps some dyspnea on Friday  Alexander Hurtlander Her BP was elevated    She did not have wt gain    She does have  Palpitations after she eats lunch each day    She denies edema  She denies anginal chest pain  She was instructed to take amlodipine 2 5 mg and lasix 20 mg on Friday        Patient Active Problem List   Diagnosis    Atrial fibrillation ([facility] [address] )    Benign essential hypertension    Mitral valve insufficiency, acquired    Osteoporosis    TIA (transient ischemic attack)    Closed compression fracture of L1 lumbar vertebra    Hyponatremia    Thoracic compression fracture (HCC)    Volume overload    Uncomplicated opioid dependence (HCC)    Chronic diastolic (congestive) heart failure (HCC)    Arthritis    Cerebral infarction (HCC)    Chronic sacroiliac (SI) strain, initial encounter    Chronically on opiate therapy    Elevated antinuclear antibody (EDGAR) level    Injury, shoulder and upper arm    Low back pain    Lumbar spinal stenosis    Osteoarthritis of multiple joints    Pain in thoracic spine    Panniculitis involving thoracolumbar region    Post-menopause bleeding    Senile osteoporosis    Spondylosis of lumbar region without myelopathy or radiculopathy    Unspecified late effects of cerebrovascular disease    Uterovaginal prolapse, incomplete    Medicare annual wellness visit, subsequent     Past Medical History:   Diagnosis Date    Hyperlipidemia     Hypertension     L1 vertebral fracture (HCC)     Osteoporosis     Paroxysmal A-fib (HCC)     TIA (transient ischemic attack)      Social History     Socioeconomic History    Marital status: Single     Spouse name: Not on file    Number of children: Not on file    Years of education: Not on file    Highest education level: Not on file   Occupational History    Not on file   Social Needs    Financial resource strain: Not on file    Food insecurity:     Worry: Not on file     Inability: Not on file    Transportation needs:     Medical: Not on file     Non-medical: Not on file   Tobacco Use    Smoking status: Former Smoker    Smokeless tobacco: Never Used   Substance and Sexual Activity    Alcohol use: Never     Frequency: Never     Binge frequency: Never    Drug use: Never    Sexual activity: Not on file   Lifestyle    Physical activity:     Days per week: Not on file     Minutes per session: Not on file    Stress: Not on file   Relationships    Social connections:     Talks on phone: Not on file     Gets together: Not on file     Attends Restorationism service: Not on file     Active member of club or organization: Not on file     Attends meetings of clubs or organizations: Not on file     Relationship status: Not on file    Intimate partner violence:     Fear of current or ex partner: Not on file     Emotionally abused: Not on file     Physically abused: Not on file     Forced sexual activity: Not on file   Other Topics Concern    Not on file   Social History Narrative    Not on file      Family History   Problem Relation Age of Onset    Heart disease Mother      Past Surgical History:   Procedure Laterality Date    BACK SURGERY         Current Outpatient Medications:     atenolol (TENORMIN) 50 mg tablet, Take 1 tablet (50 mg total) by mouth daily, Disp: , Rfl:     Cholecalciferol 2000 units CAPS, Take 1 capsule by mouth, Disp: , Rfl:     cycloSPORINE (RESTASIS) 0 05 % ophthalmic emulsion, Apply 1 drop to eye every 12 (twelve) hours , Disp: , Rfl:     diclofenac sodium (VOLTAREN) 1 %, Apply 2 g topically 4 (four) times a day, Disp: 6 Tube, Rfl: 3    furosemide (LASIX) 20 mg tablet, Take 1 tablet (20 mg total) by mouth every other day, Disp: 30 tablet, Rfl: 5    HYDROmorphone (DILAUDID) 2 mg tablet, Take 2 mg by mouth 4 (four) times a day , Disp: , Rfl:     Multiple Vitamins tablet, Take 1 tablet by mouth daily, Disp: , Rfl:     ondansetron (ZOFRAN) 4 mg tablet, Take by mouth every 8 (eight) hours as needed , Disp: , Rfl:     warfarin (COUMADIN) 2 5 mg tablet, Take 1 tablet (2 5 mg total) by mouth daily or as directed, Disp: 30 tablet, Rfl: 5  Allergies   Allergen Reactions    Iodine Shortness Of Breath    Buprenorphine GI Intolerance and Other (See Comments)    Hydrocodone-Acetaminophen      Vomiting    Hydrocodone-Acetaminophen      Vomiting  Vomiting    Iodinated Diagnostic Agents     Medical Tape     Metrizamide     Naproxen GI Intolerance    Shellfish Allergy     Glucosamine Rash    Other Other (See Comments)     red raised areas - please use paper tape    Shellfish-Derived Products Rash       Review of Systems:  Review of Systems   Constitutional: Positive for appetite change  Negative for activity change, fatigue and unexpected weight change  Respiratory: Positive for shortness of breath  Negative for cough, chest tightness and wheezing  Cardiovascular: Positive for palpitations  Negative for chest pain and leg swelling  Gastrointestinal: Negative for abdominal distention, abdominal pain, blood in stool, constipation and diarrhea  Genitourinary: Positive for frequency  Negative for dysuria, hematuria and urgency  Musculoskeletal: Positive for arthralgias, back pain and gait problem  Negative for joint swelling  Neurological: Negative for dizziness, speech difficulty, light-headedness and headaches  Psychiatric/Behavioral: Negative for agitation, behavioral problems, confusion and decreased concentration  Physical Exam:  Vitals:    07/13/20 1401   BP: 140/70   BP Location: Left arm   Patient Position: Sitting   Cuff Size: Adult   Pulse: 76   Temp: (!) 96 9 °F (36 1 °C)   Weight: 55 8 kg (123 lb)       Physical Exam   Constitutional: She is oriented to person, place, and time  Elderly female in NAD   HENT:   Head: Normocephalic and atraumatic  Mouth/Throat: Oropharynx is clear and moist  No oropharyngeal exudate  Eyes: Conjunctivae are normal  No scleral icterus  Neck: Neck supple  Normal carotid pulses and no JVD present  Carotid bruit is not present  No thyromegaly present  Cardiovascular: Normal rate, regular rhythm and intact distal pulses  Exam reveals no gallop and no friction rub  Murmur (grad 3 apical murmur) heard  Pulmonary/Chest: She has decreased breath sounds in the right lower field  She has no wheezes  She has no rhonchi  She has no rales  Abdominal: Soft  She exhibits no mass  There is no hepatosplenomegaly  There is no tenderness  Musculoskeletal: She exhibits deformity (kyphosis)  She exhibits no edema or tenderness  Neurological: She is alert and oriented to person, place, and time  She has normal strength  No cranial nerve deficit or sensory deficit  Skin: Skin is warm and dry  No rash noted  No erythema  No pallor  Psychiatric: She has a normal mood and affect  Her behavior is normal  Judgment and thought content normal         Discussion/Summary:  1  Dyspnea    Suspect perhaps mild CHF    Will increase lasix to 5x weekly    Perhaps some anxiety related to elevated BP  2  HTN-elevated    Perhaps secondary to some CHF    Will amlodipine 2 5 mg daily in the p m  [facility] Continue atenolol in the morning  3  Paroxysmal atrial fibrillation    Does have some palpitations after lunch each day but in sinus rhythm  Do not feel this is contributing to dyspnea  Patient on warfarin  On atenolol for potential rate control  4  Chronic diastolic heart failure  See above comments regarding dyspnea  5  MR/MVP  Uvaldo graded this is mild-to-moderate      Follow-up as arranged next month      Esau George MD

## 2020-07-16 ENCOUNTER — TELEPHONE (OUTPATIENT)
Dept: LAB | Facility: HOSPITAL | Age: 85
End: 2020-07-16

## 2020-07-16 ENCOUNTER — TRANSCRIBE ORDERS (OUTPATIENT)
Dept: LAB | Facility: HOSPITAL | Age: 85
End: 2020-07-16

## 2020-07-27 ENCOUNTER — ANTICOAG VISIT (OUTPATIENT)
Dept: CARDIOLOGY CLINIC | Facility: CLINIC | Age: 85
End: 2020-07-27

## 2020-07-27 ENCOUNTER — APPOINTMENT (OUTPATIENT)
Dept: LAB | Facility: HOSPITAL | Age: 85
End: 2020-07-27
Attending: INTERNAL MEDICINE
Payer: MEDICARE

## 2020-07-27 DIAGNOSIS — I48.91 ATRIAL FIBRILLATION, UNSPECIFIED TYPE (HCC): ICD-10-CM

## 2020-07-27 DIAGNOSIS — I50.32 CHRONIC DIASTOLIC (CONGESTIVE) HEART FAILURE (HCC): ICD-10-CM

## 2020-07-27 LAB
ANION GAP SERPL CALCULATED.3IONS-SCNC: 5 MMOL/L (ref 4–13)
BUN SERPL-MCNC: 20 MG/DL (ref 5–25)
CALCIUM SERPL-MCNC: 9.7 MG/DL (ref 8.3–10.1)
CHLORIDE SERPL-SCNC: 104 MMOL/L (ref 100–108)
CO2 SERPL-SCNC: 28 MMOL/L (ref 21–32)
CREAT SERPL-MCNC: 0.88 MG/DL (ref 0.6–1.3)
GFR SERPL CREATININE-BSD FRML MDRD: 59 ML/MIN/1.73SQ M
GLUCOSE SERPL-MCNC: 92 MG/DL (ref 65–140)
INR PPP: 2.97 (ref 0.84–1.19)
POTASSIUM SERPL-SCNC: 4.2 MMOL/L (ref 3.5–5.3)
PROTHROMBIN TIME: 30.7 SECONDS (ref 11.6–14.5)
SODIUM SERPL-SCNC: 137 MMOL/L (ref 136–145)

## 2020-07-27 PROCEDURE — 80048 BASIC METABOLIC PNL TOTAL CA: CPT

## 2020-07-27 PROCEDURE — 36415 COLL VENOUS BLD VENIPUNCTURE: CPT

## 2020-07-27 PROCEDURE — 85610 PROTHROMBIN TIME: CPT

## 2020-07-27 NOTE — PROGRESS NOTES
Tc to pt, reported she has been taking 2 5 mg on mon not 1 25 mg , will decrease dose, 1 25 mg m/f, 2 5 mg other days of the week  inr due 2 weeks

## 2020-08-07 ENCOUNTER — PATIENT OUTREACH (OUTPATIENT)
Dept: FAMILY MEDICINE CLINIC | Facility: CLINIC | Age: 85
End: 2020-08-07

## 2020-08-07 NOTE — PROGRESS NOTES
As per patient request, this CM assisted patient in establishing with new PCP  Goals met, case closed

## 2020-08-10 ENCOUNTER — APPOINTMENT (OUTPATIENT)
Dept: LAB | Facility: HOSPITAL | Age: 85
End: 2020-08-10
Payer: MEDICARE

## 2020-08-10 ENCOUNTER — ANTICOAG VISIT (OUTPATIENT)
Dept: CARDIOLOGY CLINIC | Facility: CLINIC | Age: 85
End: 2020-08-10

## 2020-08-10 DIAGNOSIS — I48.91 ATRIAL FIBRILLATION, UNSPECIFIED TYPE (HCC): ICD-10-CM

## 2020-08-10 LAB
INR PPP: 2.77 (ref 0.84–1.19)
PROTHROMBIN TIME: 29 SECONDS (ref 11.6–14.5)

## 2020-08-10 PROCEDURE — 85610 PROTHROMBIN TIME: CPT

## 2020-08-10 PROCEDURE — 36415 COLL VENOUS BLD VENIPUNCTURE: CPT

## 2020-08-12 ENCOUNTER — TELEPHONE (OUTPATIENT)
Dept: LAB | Facility: HOSPITAL | Age: 85
End: 2020-08-12

## 2020-08-17 ENCOUNTER — OFFICE VISIT (OUTPATIENT)
Dept: CARDIOLOGY CLINIC | Facility: CLINIC | Age: 85
End: 2020-08-17
Payer: MEDICARE

## 2020-08-17 VITALS
OXYGEN SATURATION: 96 % | TEMPERATURE: 98.6 F | HEART RATE: 82 BPM | WEIGHT: 126.8 LBS | SYSTOLIC BLOOD PRESSURE: 130 MMHG | HEIGHT: 61 IN | DIASTOLIC BLOOD PRESSURE: 70 MMHG | BODY MASS INDEX: 23.94 KG/M2

## 2020-08-17 DIAGNOSIS — I50.32 CHRONIC DIASTOLIC (CONGESTIVE) HEART FAILURE (HCC): ICD-10-CM

## 2020-08-17 DIAGNOSIS — I10 BENIGN ESSENTIAL HYPERTENSION: ICD-10-CM

## 2020-08-17 DIAGNOSIS — I48.0 PAROXYSMAL ATRIAL FIBRILLATION (HCC): Primary | ICD-10-CM

## 2020-08-17 DIAGNOSIS — I34.0 MITRAL VALVE INSUFFICIENCY, ACQUIRED: ICD-10-CM

## 2020-08-17 PROCEDURE — 3078F DIAST BP <80 MM HG: CPT | Performed by: INTERNAL MEDICINE

## 2020-08-17 PROCEDURE — 3075F SYST BP GE 130 - 139MM HG: CPT | Performed by: INTERNAL MEDICINE

## 2020-08-17 PROCEDURE — 1036F TOBACCO NON-USER: CPT | Performed by: INTERNAL MEDICINE

## 2020-08-17 PROCEDURE — 3008F BODY MASS INDEX DOCD: CPT | Performed by: INTERNAL MEDICINE

## 2020-08-17 PROCEDURE — 99213 OFFICE O/P EST LOW 20 MIN: CPT | Performed by: INTERNAL MEDICINE

## 2020-08-17 PROCEDURE — 1160F RVW MEDS BY RX/DR IN RCRD: CPT | Performed by: INTERNAL MEDICINE

## 2020-08-17 NOTE — PROGRESS NOTES
Cardiology Follow Up    Geno Search  7/20/1932  [id number]  [address] [facility] [address]  [phone]    Reason for visit: One month FU for HTN  Chronic diastolic CHF, PAF, MR/MVP      1  Paroxysmal atrial fibrillation (HCC)     2  Chronic diastolic (congestive) heart failure ([address] )     3  Benign essential hypertension     4  Mitral valve insufficiency, acquired         Interval History:   Since her last visit, her BP readings have been good    She denies dyspnea   She denies palpitations    She does get some random chest tightness unrelated to activity  She denies edema         Patient Active Problem List   Diagnosis    Atrial fibrillation ([address] )    Benign essential hypertension    Mitral valve insufficiency, acquired    Osteoporosis    TIA (transient ischemic attack)    Closed compression fracture of L1 lumbar vertebra    Hyponatremia    Thoracic compression fracture (HCC)    Volume overload    Uncomplicated opioid dependence (HCC)    Chronic diastolic (congestive) heart failure (HCC)    Arthritis    Cerebral infarction (HCC)    Chronic sacroiliac (SI) strain, initial encounter    Chronically on opiate therapy    Elevated antinuclear antibody (EDGAR) level    Injury, shoulder and upper arm    Low back pain    Lumbar spinal stenosis    Osteoarthritis of multiple joints    Pain in thoracic spine    Panniculitis involving thoracolumbar region    Post-menopause bleeding    Senile osteoporosis    Spondylosis of lumbar region without myelopathy or radiculopathy    Unspecified late effects of cerebrovascular disease    Uterovaginal prolapse, incomplete    Medicare annual wellness visit, subsequent     Past Medical History:   Diagnosis Date    Hyperlipidemia     Hypertension     L1 vertebral fracture (HCC)     Osteoporosis     Paroxysmal A-fib (HCC)     TIA (transient ischemic attack) Social History     Socioeconomic History    Marital status: Single     Spouse name: Not on file    Number of children: Not on file    Years of education: Not on file    Highest education level: Not on file   Occupational History    Not on file   Social Needs    Financial resource strain: Not on file    Food insecurity     Worry: Not on file     Inability: Not on file    Transportation needs     Medical: Not on file     Non-medical: Not on file   Tobacco Use    Smoking status: Former Smoker    Smokeless tobacco: Never Used   Substance and Sexual Activity    Alcohol use: Never     Frequency: Never     Binge frequency: Never    Drug use: Never    Sexual activity: Not on file   Lifestyle    Physical activity     Days per week: Not on file     Minutes per session: Not on file    Stress: Not on file   Relationships    Social connections     Talks on phone: Not on file     Gets together: Not on file     Attends Latter-day service: Not on file     Active member of club or organization: Not on file     Attends meetings of clubs or organizations: Not on file     Relationship status: Not on file    Intimate partner violence     Fear of current or ex partner: Not on file     Emotionally abused: Not on file     Physically abused: Not on file     Forced sexual activity: Not on file   Other Topics Concern    Not on file   Social History Narrative    Not on file      Family History   Problem Relation Age of Onset    Heart disease Mother      Past Surgical History:   Procedure Laterality Date    BACK SURGERY         Current Outpatient Medications:     amLODIPine (NORVASC) 2 5 mg tablet, Take 1 tablet (2 5 mg total) by mouth daily, Disp: 90 tablet, Rfl: 3    atenolol (TENORMIN) 50 mg tablet, Take 1 tablet (50 mg total) by mouth daily, Disp: , Rfl:     Cholecalciferol 2000 units CAPS, Take 1 capsule by mouth, Disp: , Rfl:     cycloSPORINE (RESTASIS) 0 05 % ophthalmic emulsion, Apply 1 drop to eye every 12 (twelve) hours , Disp: , Rfl:     diclofenac sodium (VOLTAREN) 1 %, Apply 2 g topically 4 (four) times a day, Disp: 6 Tube, Rfl: 3    furosemide (LASIX) 20 mg tablet, Take 1 tablet (20 mg total) by mouth daily as needed (SOB), Disp: 90 tablet, Rfl: 3    HYDROmorphone (DILAUDID) 2 mg tablet, Take 2 mg by mouth 4 (four) times a day , Disp: , Rfl:     Multiple Vitamins tablet, Take 1 tablet by mouth daily, Disp: , Rfl:     warfarin (COUMADIN) 2 5 mg tablet, Take 1 tablet (2 5 mg total) by mouth daily or as directed, Disp: 30 tablet, Rfl: 5  Allergies   Allergen Reactions    Iodine Shortness Of Breath    Acyclovir     Buprenorphine GI Intolerance and Other (See Comments)    Hydrocodone-Acetaminophen      Vomiting    Hydrocodone-Acetaminophen      Vomiting  Vomiting    Iodinated Diagnostic Agents     Latex     Medical Tape     Metrizamide     Naproxen GI Intolerance    Shellfish Allergy     Glucosamine Rash    Other Other (See Comments)     red raised areas - please use paper tape    Shellfish-Derived Products Rash       Review of Systems:  Review of Systems   Constitutional: Negative for appetite change, fatigue and unexpected weight change  Respiratory: Positive for chest tightness  Negative for cough, shortness of breath and wheezing  Cardiovascular: Negative for chest pain and leg swelling  Gastrointestinal: Negative for blood in stool, constipation and diarrhea  Genitourinary: Positive for frequency  Negative for hematuria  Musculoskeletal: Positive for arthralgias, back pain and gait problem  Neurological: Positive for headaches  Negative for dizziness and light-headedness  Physical Exam:  Vitals:    08/17/20 1301   BP: 130/70   Pulse: 82   Temp: 98 6 °F (37 °C)   SpO2: 96%   Weight: 57 5 kg (126 lb 12 8 oz)   Height: 5' 1" (1 549 m)       Physical Exam  Constitutional:       General: She is not in acute distress  Appearance: She is not ill-appearing     HENT:      Head: Normocephalic and atraumatic  Mouth/Throat:      Mouth: Mucous membranes are moist       Pharynx: No oropharyngeal exudate or posterior oropharyngeal erythema  Eyes:      General: No scleral icterus  Pupils: Pupils are equal, round, and reactive to light  Neck:      Musculoskeletal: Neck supple  No muscular tenderness  Thyroid: No thyroid mass or thyromegaly  Cardiovascular:      Rate and Rhythm: Normal rate and regular rhythm  Heart sounds: Murmur present  Systolic (apical) murmur present with a grade of 3/6  No diastolic murmur  No friction rub  No gallop  Pulmonary:      Breath sounds: Normal breath sounds  No wheezing, rhonchi or rales  Abdominal:      General: There is no distension  Palpations: Abdomen is soft  There is no hepatomegaly, splenomegaly or mass  Tenderness: There is no abdominal tenderness  Musculoskeletal:         General: No swelling  Neurological:      Mental Status: She is alert  Discussion/Summary:  1  Paroxysmal atrial fibrillation  Surprisingly still in sinus rhythm  On warfarin for stroke prophylaxis  On atenolol for potential rate control as well as hypertension  2  Chronic diastolic heart failure  Well compensated on furosemide 5 times weekly  Continue same  3  Hypertension  Excellent control on low-dose amlodipine along with atenolol  4  Mitral insufficiency  Mild to moderate on transesophageal ECHO although I feel it is more severe than that  Poor operative candidate    Continue to watch going forward      FU 4 months        Allie Cameron MD

## 2020-08-31 ENCOUNTER — APPOINTMENT (OUTPATIENT)
Dept: LAB | Facility: HOSPITAL | Age: 85
End: 2020-08-31
Attending: INTERNAL MEDICINE
Payer: MEDICARE

## 2020-08-31 ENCOUNTER — ANTICOAG VISIT (OUTPATIENT)
Dept: CARDIOLOGY CLINIC | Facility: CLINIC | Age: 85
End: 2020-08-31

## 2020-08-31 DIAGNOSIS — I48.91 ATRIAL FIBRILLATION, UNSPECIFIED TYPE (HCC): ICD-10-CM

## 2020-08-31 DIAGNOSIS — I48.0 PAROXYSMAL ATRIAL FIBRILLATION (HCC): ICD-10-CM

## 2020-08-31 LAB
INR PPP: 2.53 (ref 0.84–1.19)
PROTHROMBIN TIME: 27.1 SECONDS (ref 11.6–14.5)

## 2020-08-31 PROCEDURE — 36415 COLL VENOUS BLD VENIPUNCTURE: CPT

## 2020-08-31 PROCEDURE — 85610 PROTHROMBIN TIME: CPT

## 2020-08-31 NOTE — PROGRESS NOTES
Tc to pt, will continue current dose, inr due 4 weeks  Planning mohs procedure on 9/14  She will consult with surgeon if warfarin needs to be hel or if inr needs to be checked sooner

## 2020-09-04 ENCOUNTER — TELEPHONE (OUTPATIENT)
Dept: LAB | Facility: HOSPITAL | Age: 85
End: 2020-09-04

## 2020-09-04 NOTE — TELEPHONE ENCOUNTER
Sched 9/28 at 10 AM, ok to jiggle later if we need early appointments  She loves both Lula and Minerva Maloney - they do a wonderful job!

## 2020-09-28 ENCOUNTER — APPOINTMENT (OUTPATIENT)
Dept: LAB | Facility: HOSPITAL | Age: 85
End: 2020-09-28
Attending: INTERNAL MEDICINE
Payer: MEDICARE

## 2020-09-28 ENCOUNTER — ANTICOAG VISIT (OUTPATIENT)
Dept: CARDIOLOGY CLINIC | Facility: CLINIC | Age: 85
End: 2020-09-28

## 2020-09-28 DIAGNOSIS — I48.91 ATRIAL FIBRILLATION, UNSPECIFIED TYPE (HCC): ICD-10-CM

## 2020-09-28 DIAGNOSIS — I48.0 PAROXYSMAL ATRIAL FIBRILLATION (HCC): ICD-10-CM

## 2020-09-28 LAB
INR PPP: 2.26 (ref 0.84–1.19)
PROTHROMBIN TIME: 24.9 SECONDS (ref 11.6–14.5)

## 2020-09-28 PROCEDURE — 36415 COLL VENOUS BLD VENIPUNCTURE: CPT

## 2020-09-28 PROCEDURE — 85610 PROTHROMBIN TIME: CPT

## 2020-10-01 ENCOUNTER — TELEPHONE (OUTPATIENT)
Dept: LAB | Facility: HOSPITAL | Age: 85
End: 2020-10-01

## 2020-10-13 ENCOUNTER — TELEPHONE (OUTPATIENT)
Dept: FAMILY MEDICINE CLINIC | Facility: CLINIC | Age: 85
End: 2020-10-13

## 2020-10-15 ENCOUNTER — OFFICE VISIT (OUTPATIENT)
Dept: FAMILY MEDICINE CLINIC | Facility: CLINIC | Age: 85
End: 2020-10-15
Payer: MEDICARE

## 2020-10-15 VITALS
WEIGHT: 125 LBS | HEART RATE: 77 BPM | HEIGHT: 61 IN | SYSTOLIC BLOOD PRESSURE: 142 MMHG | DIASTOLIC BLOOD PRESSURE: 78 MMHG | OXYGEN SATURATION: 98 % | BODY MASS INDEX: 23.6 KG/M2

## 2020-10-15 DIAGNOSIS — I10 BENIGN ESSENTIAL HYPERTENSION: ICD-10-CM

## 2020-10-15 DIAGNOSIS — G45.9 TIA (TRANSIENT ISCHEMIC ATTACK): Primary | ICD-10-CM

## 2020-10-15 DIAGNOSIS — I48.19 PERSISTENT ATRIAL FIBRILLATION (HCC): ICD-10-CM

## 2020-10-15 PROCEDURE — 99214 OFFICE O/P EST MOD 30 MIN: CPT | Performed by: FAMILY MEDICINE

## 2020-10-16 ENCOUNTER — TELEPHONE (OUTPATIENT)
Dept: CARDIOLOGY CLINIC | Facility: CLINIC | Age: 85
End: 2020-10-16

## 2020-10-26 ENCOUNTER — APPOINTMENT (OUTPATIENT)
Dept: LAB | Facility: HOSPITAL | Age: 85
End: 2020-10-26
Attending: INTERNAL MEDICINE
Payer: MEDICARE

## 2020-10-26 ENCOUNTER — ANTICOAG VISIT (OUTPATIENT)
Dept: CARDIOLOGY CLINIC | Facility: CLINIC | Age: 85
End: 2020-10-26

## 2020-10-26 DIAGNOSIS — G45.9 TIA (TRANSIENT ISCHEMIC ATTACK): ICD-10-CM

## 2020-10-26 DIAGNOSIS — I48.19 PERSISTENT ATRIAL FIBRILLATION (HCC): ICD-10-CM

## 2020-10-26 DIAGNOSIS — I48.91 ATRIAL FIBRILLATION, UNSPECIFIED TYPE (HCC): ICD-10-CM

## 2020-10-26 DIAGNOSIS — I10 BENIGN ESSENTIAL HYPERTENSION: ICD-10-CM

## 2020-10-26 LAB
ALBUMIN SERPL BCP-MCNC: 3.8 G/DL (ref 3.5–5)
ALP SERPL-CCNC: 79 U/L (ref 46–116)
ALT SERPL W P-5'-P-CCNC: 21 U/L (ref 12–78)
ANION GAP SERPL CALCULATED.3IONS-SCNC: 4 MMOL/L (ref 4–13)
AST SERPL W P-5'-P-CCNC: 31 U/L (ref 5–45)
BASOPHILS # BLD AUTO: 0.03 THOUSANDS/ΜL (ref 0–0.1)
BASOPHILS NFR BLD AUTO: 0 % (ref 0–1)
BILIRUB SERPL-MCNC: 0.52 MG/DL (ref 0.2–1)
BUN SERPL-MCNC: 18 MG/DL (ref 5–25)
CALCIUM SERPL-MCNC: 9.2 MG/DL (ref 8.3–10.1)
CHLORIDE SERPL-SCNC: 100 MMOL/L (ref 100–108)
CHOLEST SERPL-MCNC: 219 MG/DL (ref 50–200)
CO2 SERPL-SCNC: 31 MMOL/L (ref 21–32)
CREAT SERPL-MCNC: 0.85 MG/DL (ref 0.6–1.3)
EOSINOPHIL # BLD AUTO: 0.48 THOUSAND/ΜL (ref 0–0.61)
EOSINOPHIL NFR BLD AUTO: 6 % (ref 0–6)
ERYTHROCYTE [DISTWIDTH] IN BLOOD BY AUTOMATED COUNT: 13.4 % (ref 11.6–15.1)
GFR SERPL CREATININE-BSD FRML MDRD: 61 ML/MIN/1.73SQ M
GLUCOSE SERPL-MCNC: 78 MG/DL (ref 65–140)
HCT VFR BLD AUTO: 44.5 % (ref 34.8–46.1)
HDLC SERPL-MCNC: 69 MG/DL
HGB BLD-MCNC: 14.4 G/DL (ref 11.5–15.4)
IMM GRANULOCYTES # BLD AUTO: 0.03 THOUSAND/UL (ref 0–0.2)
IMM GRANULOCYTES NFR BLD AUTO: 0 % (ref 0–2)
INR PPP: 2.56 (ref 0.84–1.19)
LDLC SERPL CALC-MCNC: 123 MG/DL (ref 0–100)
LYMPHOCYTES # BLD AUTO: 1.71 THOUSANDS/ΜL (ref 0.6–4.47)
LYMPHOCYTES NFR BLD AUTO: 23 % (ref 14–44)
MCH RBC QN AUTO: 31.1 PG (ref 26.8–34.3)
MCHC RBC AUTO-ENTMCNC: 32.4 G/DL (ref 31.4–37.4)
MCV RBC AUTO: 96 FL (ref 82–98)
MONOCYTES # BLD AUTO: 0.57 THOUSAND/ΜL (ref 0.17–1.22)
MONOCYTES NFR BLD AUTO: 8 % (ref 4–12)
NEUTROPHILS # BLD AUTO: 4.77 THOUSANDS/ΜL (ref 1.85–7.62)
NEUTS SEG NFR BLD AUTO: 63 % (ref 43–75)
NONHDLC SERPL-MCNC: 150 MG/DL
NRBC BLD AUTO-RTO: 0 /100 WBCS
PLATELET # BLD AUTO: 230 THOUSANDS/UL (ref 149–390)
PMV BLD AUTO: 9.4 FL (ref 8.9–12.7)
POTASSIUM SERPL-SCNC: 4.2 MMOL/L (ref 3.5–5.3)
PROT SERPL-MCNC: 8.5 G/DL (ref 6.4–8.2)
PROTHROMBIN TIME: 27.4 SECONDS (ref 11.6–14.5)
RBC # BLD AUTO: 4.63 MILLION/UL (ref 3.81–5.12)
SODIUM SERPL-SCNC: 135 MMOL/L (ref 136–145)
TRIGL SERPL-MCNC: 134 MG/DL
TSH SERPL DL<=0.05 MIU/L-ACNC: 0.67 UIU/ML (ref 0.36–3.74)
WBC # BLD AUTO: 7.59 THOUSAND/UL (ref 4.31–10.16)

## 2020-10-26 PROCEDURE — 85025 COMPLETE CBC W/AUTO DIFF WBC: CPT

## 2020-10-26 PROCEDURE — 84443 ASSAY THYROID STIM HORMONE: CPT

## 2020-10-26 PROCEDURE — 85610 PROTHROMBIN TIME: CPT

## 2020-10-26 PROCEDURE — 36415 COLL VENOUS BLD VENIPUNCTURE: CPT

## 2020-10-26 PROCEDURE — 80053 COMPREHEN METABOLIC PANEL: CPT

## 2020-10-26 PROCEDURE — 80061 LIPID PANEL: CPT

## 2020-10-30 ENCOUNTER — OFFICE VISIT (OUTPATIENT)
Dept: FAMILY MEDICINE CLINIC | Facility: CLINIC | Age: 85
End: 2020-10-30
Payer: MEDICARE

## 2020-10-30 VITALS
BODY MASS INDEX: 23.39 KG/M2 | DIASTOLIC BLOOD PRESSURE: 80 MMHG | SYSTOLIC BLOOD PRESSURE: 140 MMHG | WEIGHT: 123.8 LBS | HEART RATE: 75 BPM

## 2020-10-30 DIAGNOSIS — G45.9 TIA (TRANSIENT ISCHEMIC ATTACK): ICD-10-CM

## 2020-10-30 DIAGNOSIS — N30.00 ACUTE CYSTITIS WITHOUT HEMATURIA: Primary | ICD-10-CM

## 2020-10-30 DIAGNOSIS — I48.19 PERSISTENT ATRIAL FIBRILLATION (HCC): ICD-10-CM

## 2020-10-30 PROBLEM — I73.9 PERIPHERAL VASCULAR DISEASE OF LOWER EXTREMITY (HCC): Status: ACTIVE | Noted: 2020-07-10

## 2020-10-30 PROBLEM — B35.1 ONYCHOMYCOSIS OF TOENAIL: Status: ACTIVE | Noted: 2020-07-10

## 2020-10-30 PROBLEM — L60.3 DYSTROPHIA UNGUIUM: Status: ACTIVE | Noted: 2020-07-10

## 2020-10-30 PROCEDURE — 99213 OFFICE O/P EST LOW 20 MIN: CPT | Performed by: FAMILY MEDICINE

## 2020-11-13 DIAGNOSIS — I48.0 PAROXYSMAL ATRIAL FIBRILLATION (HCC): ICD-10-CM

## 2020-11-13 DIAGNOSIS — I10 BENIGN ESSENTIAL HYPERTENSION: ICD-10-CM

## 2020-11-13 RX ORDER — ATENOLOL 50 MG/1
50 TABLET ORAL DAILY
Qty: 90 TABLET | Refills: 3 | Status: SHIPPED | OUTPATIENT
Start: 2020-11-13

## 2020-11-20 ENCOUNTER — ANTICOAG VISIT (OUTPATIENT)
Dept: CARDIOLOGY CLINIC | Facility: CLINIC | Age: 85
End: 2020-11-20

## 2020-11-20 ENCOUNTER — APPOINTMENT (OUTPATIENT)
Dept: LAB | Facility: HOSPITAL | Age: 85
End: 2020-11-20
Attending: INTERNAL MEDICINE
Payer: MEDICARE

## 2020-11-20 DIAGNOSIS — I48.19 PERSISTENT ATRIAL FIBRILLATION (HCC): ICD-10-CM

## 2020-11-20 DIAGNOSIS — I48.91 ATRIAL FIBRILLATION, UNSPECIFIED TYPE (HCC): ICD-10-CM

## 2020-11-20 LAB
BACTERIA UR QL AUTO: ABNORMAL /HPF
BILIRUB UR QL STRIP: NEGATIVE
CAOX CRY URNS QL MICRO: ABNORMAL /HPF
CLARITY UR: ABNORMAL
COLOR UR: YELLOW
FINE GRAN CASTS URNS QL MICRO: ABNORMAL /LPF
GLUCOSE UR STRIP-MCNC: NEGATIVE MG/DL
HGB UR QL STRIP.AUTO: NEGATIVE
HYALINE CASTS #/AREA URNS LPF: ABNORMAL /LPF
INR PPP: 3.01 (ref 0.84–1.19)
KETONES UR STRIP-MCNC: NEGATIVE MG/DL
LEUKOCYTE ESTERASE UR QL STRIP: ABNORMAL
MUCOUS THREADS UR QL AUTO: ABNORMAL
NITRITE UR QL STRIP: NEGATIVE
NON-SQ EPI CELLS URNS QL MICRO: ABNORMAL /HPF
PH UR STRIP.AUTO: 6 [PH]
PROT UR STRIP-MCNC: ABNORMAL MG/DL
PROTHROMBIN TIME: 31 SECONDS (ref 11.6–14.5)
RBC #/AREA URNS AUTO: ABNORMAL /HPF
SP GR UR STRIP.AUTO: 1.02 (ref 1–1.03)
UROBILINOGEN UR QL STRIP.AUTO: 0.2 E.U./DL
WBC #/AREA URNS AUTO: ABNORMAL /HPF

## 2020-11-20 PROCEDURE — 81001 URINALYSIS AUTO W/SCOPE: CPT | Performed by: FAMILY MEDICINE

## 2020-11-20 PROCEDURE — 36415 COLL VENOUS BLD VENIPUNCTURE: CPT

## 2020-11-20 PROCEDURE — 85610 PROTHROMBIN TIME: CPT

## 2020-11-27 ENCOUNTER — OFFICE VISIT (OUTPATIENT)
Dept: CARDIOLOGY CLINIC | Facility: CLINIC | Age: 85
End: 2020-11-27
Payer: MEDICARE

## 2020-11-27 VITALS
SYSTOLIC BLOOD PRESSURE: 110 MMHG | BODY MASS INDEX: 23.86 KG/M2 | DIASTOLIC BLOOD PRESSURE: 70 MMHG | TEMPERATURE: 97.6 F | HEIGHT: 61 IN | OXYGEN SATURATION: 97 % | HEART RATE: 70 BPM | WEIGHT: 126.4 LBS

## 2020-11-27 DIAGNOSIS — I10 BENIGN ESSENTIAL HYPERTENSION: ICD-10-CM

## 2020-11-27 DIAGNOSIS — I50.32 CHRONIC DIASTOLIC (CONGESTIVE) HEART FAILURE (HCC): ICD-10-CM

## 2020-11-27 DIAGNOSIS — I34.0 MITRAL VALVE INSUFFICIENCY, ACQUIRED: ICD-10-CM

## 2020-11-27 DIAGNOSIS — I48.0 PAROXYSMAL ATRIAL FIBRILLATION (HCC): Primary | ICD-10-CM

## 2020-11-27 PROCEDURE — 99213 OFFICE O/P EST LOW 20 MIN: CPT | Performed by: INTERNAL MEDICINE

## 2020-12-02 ENCOUNTER — APPOINTMENT (OUTPATIENT)
Dept: LAB | Facility: HOSPITAL | Age: 85
End: 2020-12-02
Attending: INTERNAL MEDICINE
Payer: MEDICARE

## 2020-12-02 ENCOUNTER — ANTICOAG VISIT (OUTPATIENT)
Dept: CARDIOLOGY CLINIC | Facility: CLINIC | Age: 85
End: 2020-12-02

## 2020-12-02 DIAGNOSIS — I48.0 PAROXYSMAL ATRIAL FIBRILLATION (HCC): ICD-10-CM

## 2020-12-02 DIAGNOSIS — I48.91 ATRIAL FIBRILLATION, UNSPECIFIED TYPE (HCC): ICD-10-CM

## 2020-12-02 LAB
INR PPP: 2.48 (ref 0.84–1.19)
PROTHROMBIN TIME: 26.7 SECONDS (ref 11.6–14.5)

## 2020-12-02 PROCEDURE — 85610 PROTHROMBIN TIME: CPT

## 2020-12-02 PROCEDURE — 36415 COLL VENOUS BLD VENIPUNCTURE: CPT

## 2020-12-03 ENCOUNTER — TELEPHONE (OUTPATIENT)
Dept: LAB | Facility: HOSPITAL | Age: 85
End: 2020-12-03

## 2020-12-03 ENCOUNTER — TRANSCRIBE ORDERS (OUTPATIENT)
Dept: LAB | Facility: HOSPITAL | Age: 85
End: 2020-12-03

## 2020-12-14 ENCOUNTER — TELEPHONE (OUTPATIENT)
Dept: CARDIOLOGY CLINIC | Facility: CLINIC | Age: 85
End: 2020-12-14

## 2020-12-23 ENCOUNTER — APPOINTMENT (OUTPATIENT)
Dept: LAB | Facility: HOSPITAL | Age: 85
End: 2020-12-23
Attending: INTERNAL MEDICINE
Payer: MEDICARE

## 2020-12-23 DIAGNOSIS — I48.91 ATRIAL FIBRILLATION, UNSPECIFIED TYPE (HCC): ICD-10-CM

## 2020-12-23 LAB
INR PPP: 3.14 (ref 0.84–1.19)
PROTHROMBIN TIME: 32 SECONDS (ref 11.6–14.5)

## 2020-12-23 PROCEDURE — 85610 PROTHROMBIN TIME: CPT

## 2020-12-23 PROCEDURE — 36415 COLL VENOUS BLD VENIPUNCTURE: CPT

## 2020-12-24 ENCOUNTER — ANTICOAG VISIT (OUTPATIENT)
Dept: CARDIOLOGY CLINIC | Facility: CLINIC | Age: 85
End: 2020-12-24

## 2020-12-24 ENCOUNTER — TELEPHONE (OUTPATIENT)
Dept: LAB | Facility: HOSPITAL | Age: 85
End: 2020-12-24

## 2020-12-24 DIAGNOSIS — I48.0 PAROXYSMAL ATRIAL FIBRILLATION (HCC): ICD-10-CM

## 2021-01-04 ENCOUNTER — ANTICOAG VISIT (OUTPATIENT)
Dept: CARDIOLOGY CLINIC | Facility: CLINIC | Age: 86
End: 2021-01-04

## 2021-01-04 ENCOUNTER — APPOINTMENT (OUTPATIENT)
Dept: LAB | Facility: HOSPITAL | Age: 86
End: 2021-01-04
Attending: INTERNAL MEDICINE
Payer: MEDICARE

## 2021-01-04 DIAGNOSIS — I48.91 ATRIAL FIBRILLATION, UNSPECIFIED TYPE (HCC): ICD-10-CM

## 2021-01-04 LAB
INR PPP: 2.37 (ref 0.84–1.19)
PROTHROMBIN TIME: 25.8 SECONDS (ref 11.6–14.5)

## 2021-01-04 PROCEDURE — 85610 PROTHROMBIN TIME: CPT

## 2021-01-04 PROCEDURE — 36415 COLL VENOUS BLD VENIPUNCTURE: CPT

## 2021-01-05 ENCOUNTER — TELEPHONE (OUTPATIENT)
Dept: LAB | Facility: HOSPITAL | Age: 86
End: 2021-01-05

## 2021-01-18 ENCOUNTER — ANTICOAG VISIT (OUTPATIENT)
Dept: CARDIOLOGY CLINIC | Facility: CLINIC | Age: 86
End: 2021-01-18

## 2021-01-18 ENCOUNTER — APPOINTMENT (OUTPATIENT)
Dept: LAB | Facility: HOSPITAL | Age: 86
End: 2021-01-18
Attending: INTERNAL MEDICINE
Payer: MEDICARE

## 2021-01-18 DIAGNOSIS — I48.91 ATRIAL FIBRILLATION, UNSPECIFIED TYPE (HCC): ICD-10-CM

## 2021-01-18 LAB
INR PPP: 2.55 (ref 0.84–1.19)
PROTHROMBIN TIME: 27.3 SECONDS (ref 11.6–14.5)

## 2021-01-18 PROCEDURE — 85610 PROTHROMBIN TIME: CPT

## 2021-01-18 PROCEDURE — 36415 COLL VENOUS BLD VENIPUNCTURE: CPT

## 2021-01-19 ENCOUNTER — TELEPHONE (OUTPATIENT)
Dept: LAB | Facility: HOSPITAL | Age: 86
End: 2021-01-19

## 2021-02-08 ENCOUNTER — APPOINTMENT (OUTPATIENT)
Dept: LAB | Facility: HOSPITAL | Age: 86
End: 2021-02-08
Attending: INTERNAL MEDICINE
Payer: MEDICARE

## 2021-02-08 ENCOUNTER — ANTICOAG VISIT (OUTPATIENT)
Dept: CARDIOLOGY CLINIC | Facility: CLINIC | Age: 86
End: 2021-02-08

## 2021-02-08 DIAGNOSIS — I48.91 ATRIAL FIBRILLATION, UNSPECIFIED TYPE (HCC): ICD-10-CM

## 2021-02-08 LAB
INR PPP: 2.04 (ref 0.84–1.19)
PROTHROMBIN TIME: 22.6 SECONDS (ref 11.6–14.5)

## 2021-02-08 PROCEDURE — 85610 PROTHROMBIN TIME: CPT

## 2021-02-08 PROCEDURE — 36415 COLL VENOUS BLD VENIPUNCTURE: CPT

## 2021-02-09 ENCOUNTER — TELEPHONE (OUTPATIENT)
Dept: LAB | Facility: HOSPITAL | Age: 86
End: 2021-02-09

## 2021-02-16 ENCOUNTER — TELEPHONE (OUTPATIENT)
Dept: CARDIOLOGY CLINIC | Facility: CLINIC | Age: 86
End: 2021-02-16

## 2021-02-16 NOTE — TELEPHONE ENCOUNTER
Pt filling out paper work for eye doctor and needed to know procedure she had done   told her cardioversion

## 2021-03-08 ENCOUNTER — TELEPHONE (OUTPATIENT)
Dept: LAB | Facility: HOSPITAL | Age: 86
End: 2021-03-08

## 2021-03-09 ENCOUNTER — TELEPHONE (OUTPATIENT)
Dept: URGENT CARE | Age: 86
End: 2021-03-09

## 2021-03-09 ENCOUNTER — OFFICE VISIT (OUTPATIENT)
Dept: URGENT CARE | Age: 86
End: 2021-03-09
Payer: MEDICARE

## 2021-03-09 ENCOUNTER — APPOINTMENT (OUTPATIENT)
Dept: RADIOLOGY | Age: 86
End: 2021-03-09
Payer: MEDICARE

## 2021-03-09 ENCOUNTER — TELEPHONE (OUTPATIENT)
Dept: LAB | Facility: HOSPITAL | Age: 86
End: 2021-03-09

## 2021-03-09 VITALS
DIASTOLIC BLOOD PRESSURE: 72 MMHG | HEART RATE: 76 BPM | RESPIRATION RATE: 20 BRPM | BODY MASS INDEX: 23.81 KG/M2 | OXYGEN SATURATION: 97 % | SYSTOLIC BLOOD PRESSURE: 151 MMHG | TEMPERATURE: 97.8 F | WEIGHT: 126 LBS

## 2021-03-09 DIAGNOSIS — M54.50 CHRONIC BILATERAL LOW BACK PAIN WITHOUT SCIATICA: Primary | ICD-10-CM

## 2021-03-09 DIAGNOSIS — M54.50 CHRONIC BILATERAL LOW BACK PAIN WITHOUT SCIATICA: ICD-10-CM

## 2021-03-09 DIAGNOSIS — G89.29 CHRONIC BILATERAL LOW BACK PAIN WITHOUT SCIATICA: Primary | ICD-10-CM

## 2021-03-09 DIAGNOSIS — Z87.81 HX OF COMPRESSION FRACTURE OF SPINE: ICD-10-CM

## 2021-03-09 DIAGNOSIS — G89.29 CHRONIC BILATERAL LOW BACK PAIN WITHOUT SCIATICA: ICD-10-CM

## 2021-03-09 PROCEDURE — G0463 HOSPITAL OUTPT CLINIC VISIT: HCPCS | Performed by: PHYSICIAN ASSISTANT

## 2021-03-09 PROCEDURE — 72100 X-RAY EXAM L-S SPINE 2/3 VWS: CPT

## 2021-03-09 PROCEDURE — 99213 OFFICE O/P EST LOW 20 MIN: CPT | Performed by: PHYSICIAN ASSISTANT

## 2021-03-09 NOTE — PROGRESS NOTES
[facility]        NAME: Nabil Norton is a 80 y o  female  : 1932    MRN: [id number]  DATE: 2021  TIME: 11:44 AM    Assessment and Plan   Chronic bilateral low back pain without sciatica [M54 5, G89 29]  1  Chronic bilateral low back pain without sciatica  XR spine lumbar 2 or 3 views injury   2  Hx of compression fracture of spine       Lumbar Spine XRAY: Radiology interpretation pending  Patient Instructions     Follow up with pain management within 2-3 days  Follow up with PCP in 3-5 days  Proceed to  ER if symptoms worsen  Chief Complaint     Chief Complaint   Patient presents with    Back Pain     pt reports pain in lower back since   Pt states she has known lumbar and thoracic fractures  Pt states she goes to pain management but her pain meds are not helpful         History of Present Illness       Patient is an 81 y/o/f presenting to [facility] with low back pain  Patient has a hx of chronic low back pain with known compression fractures of T12, L1, L2, L4 and L5  Patient is seen by pain management and takes Dilaudid 2mg QID and Medical Marijuana  Patient reports two days ago she was walking and lost balance  Pt caught herself prior to falling to the ground  Worsening low back pain since that time  No other reports injuries  Back Pain  This is a chronic problem  The current episode started in the past 7 days  The problem occurs constantly  The problem has been waxing and waning since onset  The pain is present in the lumbar spine  The quality of the pain is described as stabbing and aching  The pain is moderate  The symptoms are aggravated by position, sitting, standing and bending  Pertinent negatives include no abdominal pain, chest pain, dysuria or fever  Risk factors include history of osteoporosis, poor posture and menopause  She has tried analgesics for the symptoms         Review of Systems   Review of Systems   Constitutional: Negative for chills and fever    HENT: Negative for ear pain and sore throat  Eyes: Negative for pain and visual disturbance  Respiratory: Negative for cough and shortness of breath  Cardiovascular: Negative for chest pain and palpitations  Gastrointestinal: Negative for abdominal pain and vomiting  Genitourinary: Negative for dysuria and hematuria  Musculoskeletal: Positive for back pain and gait problem  Negative for arthralgias  Skin: Negative for color change and rash  Neurological: Negative for seizures and syncope  All other systems reviewed and are negative          Current Medications       Current Outpatient Medications:     amLODIPine (NORVASC) 2 5 mg tablet, Take 1 tablet (2 5 mg total) by mouth daily, Disp: 90 tablet, Rfl: 3    atenolol (TENORMIN) 50 mg tablet, Take 1 tablet (50 mg total) by mouth daily, Disp: 90 tablet, Rfl: 3    Cholecalciferol 2000 units CAPS, Take 1 capsule by mouth, Disp: , Rfl:     cycloSPORINE (RESTASIS) 0 05 % ophthalmic emulsion, Apply 1 drop to eye every 12 (twelve) hours , Disp: , Rfl:     diclofenac sodium (VOLTAREN) 1 %, Apply 2 g topically 4 (four) times a day, Disp: 6 Tube, Rfl: 3    furosemide (LASIX) 20 mg tablet, Take 1 tablet (20 mg total) by mouth daily as needed (SOB), Disp: 90 tablet, Rfl: 3    HYDROmorphone (DILAUDID) 2 mg tablet, Take 2 mg by mouth 4 (four) times a day , Disp: , Rfl:     Multiple Vitamins tablet, Take 1 tablet by mouth daily, Disp: , Rfl:     warfarin (COUMADIN) 2 5 mg tablet, Take 1 tablet (2 5 mg total) by mouth daily or as directed, Disp: 30 tablet, Rfl: 5    Current Allergies     Allergies as of 03/09/2021 - Reviewed 03/09/2021   Allergen Reaction Noted    Iodine Shortness Of Breath 07/23/2012    Acyclovir  08/17/2020    Buprenorphine GI Intolerance and Other (See Comments) 10/13/2016    Hydrocodone-acetaminophen  10/05/2015    Hydrocodone-acetaminophen  10/05/2015    Iodinated diagnostic agents  03/06/2013    Latex  08/17/2020    Medical tape  04/19/2012    Metrizamide  03/06/2013    Naproxen GI Intolerance 01/06/2016    Shellfish allergy  03/06/2013    Glucosamine Rash 07/23/2012    Other Other (See Comments) 07/23/2012    Shellfish-derived products Rash 07/23/2012            The following portions of the patient's history were reviewed and updated as appropriate: allergies, current medications, past family history, past medical history, past social history, past surgical history and problem list      Past Medical History:   Diagnosis Date    Hyperlipidemia     Hypertension     L[facility] vertebral fracture ([facility] [address] )     Osteoporosis     [facility]ox[facility]     TIA (transient ischemic attack)        Past Surgical History:   Procedure Laterality Date    BACK SURGERY         Family History   Problem Relation Age of Onset    Heart disease Mother          Medications have been verified  Objective   /72   Pulse 76   Temp 97 8 °F (36 6 °C)   Resp 20   Wt 57 2 kg (126 lb)   SpO2 97%   BMI 23 81 kg/m²   No LMP recorded  Patient is postmenopausal        Physical Exam     Physical Exam  Constitutional:       Appearance: Normal appearance  HENT:      Head: Normocephalic and atraumatic  Nose: Nose normal       Mouth/Throat:      Mouth: Mucous membranes are moist    Eyes:      Extraocular Movements: Extraocular movements intact  Conjunctiva/sclera: Conjunctivae normal       Pupils: Pupils are equal, round, and reactive to light  Neck:      Musculoskeletal: Normal range of motion and neck supple  Cardiovascular:      Rate and Rhythm: Normal rate  Pulmonary:      Effort: Pulmonary effort is normal    Musculoskeletal: Normal range of motion  Arms:    Skin:     General: Skin is warm and dry  Capillary Refill: Capillary refill takes less than 2 seconds  Neurological:      General: No focal deficit present  Mental Status: She is alert and oriented to person, place, and time     Psychiatric: Mood and Affect: Mood normal          Behavior: Behavior normal

## 2021-03-09 NOTE — PATIENT INSTRUCTIONS
Follow up with Pain management at earliest availability  PCP follow up in 2-3 days  ED if symptoms worsen

## 2021-03-12 ENCOUNTER — ANTICOAG VISIT (OUTPATIENT)
Dept: CARDIOLOGY CLINIC | Facility: CLINIC | Age: 86
End: 2021-03-12

## 2021-03-12 ENCOUNTER — APPOINTMENT (OUTPATIENT)
Dept: LAB | Facility: HOSPITAL | Age: 86
End: 2021-03-12
Attending: INTERNAL MEDICINE
Payer: MEDICARE

## 2021-03-12 DIAGNOSIS — I48.91 ATRIAL FIBRILLATION, UNSPECIFIED TYPE (HCC): ICD-10-CM

## 2021-03-12 PROCEDURE — 85610 PROTHROMBIN TIME: CPT

## 2021-03-12 NOTE — PROGRESS NOTES
Tc to pt, will continue current dose, inr due 4 weeks  Pt will be planning cataract surgery in future, no date planned as yet

## 2021-03-15 ENCOUNTER — TELEPHONE (OUTPATIENT)
Dept: LAB | Facility: HOSPITAL | Age: 86
End: 2021-03-15

## 2021-03-19 ENCOUNTER — TELEPHONE (OUTPATIENT)
Dept: OTHER | Facility: OTHER | Age: 86
End: 2021-03-19

## 2021-03-20 NOTE — TELEPHONE ENCOUNTER
Aurelia Listen [birth year] Pt states that her blood pressure has gone up but she is not sure how high  She wanted to know if it was okay for her to take another one of her pills to lower it   [phone]    Paged Dr Gavin North via [facility]

## 2021-03-22 ENCOUNTER — TELEPHONE (OUTPATIENT)
Dept: CARDIOLOGY CLINIC | Facility: CLINIC | Age: 86
End: 2021-03-22

## 2021-03-22 NOTE — TELEPHONE ENCOUNTER
Follow up phone call made to patient concerning her call made to our answering service  Patient states that she had taken her blood pressure in the evening and her reading was 158/80  SH e was worried and concerned and a TT was sent to Dr Donavan Ruano  She stated to Dr Jacoby Dempsey that in the past she should take and additonal amlodipine if her blood pressure is high  He advised her to follow with Dr Serina Guzmán prevoius recommendations to take an additional Amlodipine 2 5 mgs  She did take the extra dose and her b/p readings  are now in the 130's /60 range  She denies and headache dizziness  Will forward this information to Dr Kay Lemon and patient verbalizes and understands the above message

## 2021-04-08 ENCOUNTER — APPOINTMENT (OUTPATIENT)
Dept: LAB | Facility: HOSPITAL | Age: 86
End: 2021-04-08
Attending: INTERNAL MEDICINE
Payer: MEDICARE

## 2021-04-08 ENCOUNTER — ANTICOAG VISIT (OUTPATIENT)
Dept: CARDIOLOGY CLINIC | Facility: CLINIC | Age: 86
End: 2021-04-08

## 2021-04-08 DIAGNOSIS — I48.91 ATRIAL FIBRILLATION, UNSPECIFIED TYPE (HCC): ICD-10-CM

## 2021-04-08 LAB
INR PPP: 2.05 (ref 0.84–1.19)
PROTHROMBIN TIME: 22.7 SECONDS (ref 11.6–14.5)

## 2021-04-08 PROCEDURE — 36415 COLL VENOUS BLD VENIPUNCTURE: CPT

## 2021-04-08 PROCEDURE — 85610 PROTHROMBIN TIME: CPT

## 2021-04-13 ENCOUNTER — TELEPHONE (OUTPATIENT)
Dept: CARDIOLOGY CLINIC | Facility: CLINIC | Age: 86
End: 2021-04-13

## 2021-04-26 PROBLEM — I11.0 HYPERTENSIVE HEART DISEASE WITH CONGESTIVE HEART FAILURE (HCC): Status: ACTIVE | Noted: 2021-04-26

## 2021-04-28 ENCOUNTER — CONSULT (OUTPATIENT)
Dept: FAMILY MEDICINE CLINIC | Facility: CLINIC | Age: 86
End: 2021-04-28
Payer: MEDICARE

## 2021-04-28 VITALS
WEIGHT: 125 LBS | HEIGHT: 61 IN | SYSTOLIC BLOOD PRESSURE: 120 MMHG | HEART RATE: 75 BPM | BODY MASS INDEX: 23.6 KG/M2 | OXYGEN SATURATION: 96 % | DIASTOLIC BLOOD PRESSURE: 78 MMHG | TEMPERATURE: 97.2 F

## 2021-04-28 DIAGNOSIS — H25.093 AGE-RELATED INCIPIENT CATARACT OF BOTH EYES: Primary | ICD-10-CM

## 2021-04-28 DIAGNOSIS — Z01.818 PREOPERATIVE CLEARANCE: ICD-10-CM

## 2021-04-28 PROCEDURE — 99213 OFFICE O/P EST LOW 20 MIN: CPT | Performed by: FAMILY MEDICINE

## 2021-04-30 PROBLEM — Z01.818 PREOPERATIVE CLEARANCE: Status: ACTIVE | Noted: 2021-04-30

## 2021-04-30 PROBLEM — H25.093 AGE-RELATED INCIPIENT CATARACT OF BOTH EYES: Status: ACTIVE | Noted: 2021-04-30

## 2021-04-30 NOTE — PROGRESS NOTES
Assessment/Plan:     58-year-old woman with:  Preop clearance for upcoming cataract surgery patient may proceed to the OR with acceptable risk for local anesthesia discussed supportive care return parameters    No problem-specific Assessment & Plan notes found for this encounter  Diagnoses and all orders for this visit:    Age-related incipient cataract of both eyes    Preoperative clearance          Subjective:     Chief Complaint   Patient presents with    Pre-op Exam     Cataract Surgery Right Eye 5/11/201 and Left Eye 5/25/2021 at  [facility]         Patient ID: Raymond Crespo is a 80 y o  female  Patient is an 80-year-o ld woman who presents for preop clearance for upcoming cataract surgery she denies chest pain or shortness of breath she admits good functional capacity she is just getting local anesthesia       The following portions of the patient's history were reviewed and updated as appropriate: allergies, current medications, past family history, past medical history, past social history, past surgical history and problem list     Review of Systems   Constitutional: Negative  HENT: Negative  Eyes: Negative  Respiratory: Negative  Cardiovascular: Negative  Gastrointestinal: Negative  Endocrine: Negative  Genitourinary: Negative  Musculoskeletal: Negative  Allergic/Immunologic: Negative  Neurological: Negative  Hematological: Negative  Psychiatric/Behavioral: Negative  All other systems reviewed and are negative  Objective:      /78 (BP Location: Left arm, Patient Position: Sitting, Cuff Size: Standard)   Pulse 75   Temp (!) 97 2 °F (36 2 °C) (Tympanic)   Ht 5' 1" (1 549 m)   Wt 56 7 kg (125 lb)   SpO2 96%   BMI 23 62 kg/m²          Physical Exam  Constitutional:       Appearance: She is well-developed  HENT:      Head: Atraumatic        Right Ear: External ear normal       Left Ear: External ear normal    Eyes: Conjunctiva/sclera: Conjunctivae normal       Pupils: Pupils are equal, round, and reactive to light  Neck:      Musculoskeletal: Normal range of motion  Cardiovascular:      Rate and Rhythm: Normal rate and regular rhythm  Heart sounds: Normal heart sounds  Pulmonary:      Effort: Pulmonary effort is normal  No respiratory distress  Breath sounds: Normal breath sounds  Abdominal:      General: There is no distension  Palpations: Abdomen is soft  Tenderness: There is no abdominal tenderness  There is no guarding or rebound  Musculoskeletal: Normal range of motion  Skin:     General: Skin is warm and dry  Neurological:      Mental Status: She is alert and oriented to person, place, and time  Cranial Nerves: No cranial nerve deficit  Psychiatric:         Behavior: Behavior normal          Thought Content:  Thought content normal          Judgment: Judgment normal

## 2021-05-06 ENCOUNTER — APPOINTMENT (OUTPATIENT)
Dept: LAB | Facility: HOSPITAL | Age: 86
End: 2021-05-06
Attending: INTERNAL MEDICINE
Payer: MEDICARE

## 2021-05-06 ENCOUNTER — ANTICOAG VISIT (OUTPATIENT)
Dept: CARDIOLOGY CLINIC | Facility: CLINIC | Age: 86
End: 2021-05-06

## 2021-05-06 DIAGNOSIS — I48.91 ATRIAL FIBRILLATION, UNSPECIFIED TYPE (HCC): ICD-10-CM

## 2021-05-06 LAB
INR PPP: 2.13 (ref 0.84–1.19)
PROTHROMBIN TIME: 23.3 SECONDS (ref 11.6–14.5)

## 2021-05-06 PROCEDURE — 36415 COLL VENOUS BLD VENIPUNCTURE: CPT

## 2021-05-06 PROCEDURE — 85610 PROTHROMBIN TIME: CPT

## 2021-05-07 ENCOUNTER — TELEPHONE (OUTPATIENT)
Dept: LAB | Facility: HOSPITAL | Age: 86
End: 2021-05-07

## 2021-05-14 ENCOUNTER — OFFICE VISIT (OUTPATIENT)
Dept: CARDIOLOGY CLINIC | Facility: CLINIC | Age: 86
End: 2021-05-14
Payer: MEDICARE

## 2021-05-14 VITALS
BODY MASS INDEX: 23.37 KG/M2 | HEIGHT: 61 IN | DIASTOLIC BLOOD PRESSURE: 60 MMHG | SYSTOLIC BLOOD PRESSURE: 100 MMHG | HEART RATE: 63 BPM | WEIGHT: 123.8 LBS | OXYGEN SATURATION: 94 %

## 2021-05-14 DIAGNOSIS — I50.32 CHRONIC DIASTOLIC (CONGESTIVE) HEART FAILURE (HCC): ICD-10-CM

## 2021-05-14 DIAGNOSIS — I10 BENIGN ESSENTIAL HYPERTENSION: ICD-10-CM

## 2021-05-14 DIAGNOSIS — I34.0 MITRAL VALVE INSUFFICIENCY, ACQUIRED: ICD-10-CM

## 2021-05-14 DIAGNOSIS — I48.0 PAROXYSMAL ATRIAL FIBRILLATION (HCC): Primary | ICD-10-CM

## 2021-05-14 PROBLEM — I73.9 PERIPHERAL VASCULAR DISEASE OF LOWER EXTREMITY (HCC): Status: RESOLVED | Noted: 2020-07-10 | Resolved: 2021-05-14

## 2021-05-14 PROCEDURE — 99214 OFFICE O/P EST MOD 30 MIN: CPT | Performed by: INTERNAL MEDICINE

## 2021-05-14 NOTE — PROGRESS NOTES
Cardiology Follow Up    Joy Rizo  7/20/1932  [id number]  [address] [facility] [id number] [phone]    Reason for visit[de-identified] 6 month FU for HTN, chronic diastolic CHF, PAF, MR/MVP      1  Paroxysmal atrial fibrillation (HCC)     2  Benign essential hypertension     3  Mitral valve insufficiency, acquired     4  Chronic diastolic (congestive) heart failure (HCC)         Interval History: Since her last visit she denies chest pain or SOB    She does get palpitations with activity  She denies edema  She denies dizziness            Patient Active Problem List   Diagnosis    Atrial fibrillation ([address] )    Benign essential hypertension    Mitral valve insufficiency, acquired    Osteoporosis    TIA (transient ischemic attack)    Closed compression fracture of L1 lumbar vertebra    Hyponatremia    Thoracic compression fracture (HCC)    Volume overload    Uncomplicated opioid dependence (HCC)    Chronic diastolic (congestive) heart failure (HCC)    Arthritis    Cerebral infarction (HCC)    Chronic sacroiliac (SI) strain, initial encounter    Chronically on opiate therapy    Elevated antinuclear antibody (EDGAR) level    Injury, shoulder and upper arm    Low back pain    Lumbar spinal stenosis    Osteoarthritis of multiple joints    Pain in thoracic spine    Panniculitis involving thoracolumbar region    Post-menopause bleeding    Senile osteoporosis    Spondylosis of lumbar region without myelopathy or radiculopathy    Unspecified late effects of cerebrovascular disease    Uterovaginal prolapse, incomplete    Medicare annual wellness visit, subsequent    Acute cystitis without hematuria    Dystrophia unguium    Onychomycosis of toenail    Peripheral vascular disease of lower extremity ([address] )    Hypertensive heart disease with congestive heart failure ([address] )    Age-related incipient cataract of both eyes    Preoperative clearance     Past Medical History:   Diagnosis Date    Hyperlipidemia     Hypertension     L1 vertebral fracture (HCC)     Osteoporosis     Paroxysmal A-fib (HCC)     TIA (transient ischemic attack)      Social History     Socioeconomic History    Marital status: Single     Spouse name: Not on file    Number of children: Not on file    Years of education: Not on file    Highest education level: Not on file   Occupational History    Not on file   Social Needs    Financial resource strain: Not hard at all    Food insecurity     Worry: Never true     Inability: Not on file    Transportation needs     Medical: No     Non-medical: No   Tobacco Use    Smoking status: Former Smoker    Smokeless tobacco: Never Used   Substance and Sexual Activity    Alcohol use: Never     Frequency: Never     Binge frequency: Never    Drug use: Never    Sexual activity: Not on file   Lifestyle    Physical activity     Days per week: 1 day     Minutes per session: 10 min    Stress: Not at all   Relationships    Social connections     Talks on phone: More than three times a week     Gets together: More than three times a week     Attends Confucianism service: 1 to 4 times per year     Active member of club or organization: No     Attends meetings of clubs or organizations: Never     Relationship status: Never     Intimate partner violence     Fear of current or ex partner: No     Emotionally abused: No     Physically abused: No     Forced sexual activity: No   Other Topics Concern    Not on file   Social History Narrative    Not on file      Family History   Problem Relation Age of Onset    Heart disease Mother      Past Surgical History:   Procedure Laterality Date    BACK SURGERY         Current Outpatient Medications:     amLODIPine (NORVASC) 2 5 mg tablet, Take 1 tablet (2 5 mg total) by mouth daily, Disp: 90 tablet, Rfl: 3    atenolol (TENORMIN) 50 mg tablet, Take 1 tablet (50 mg total) by mouth daily, Disp: 90 tablet, Rfl: 3    Cholecalciferol 2000 units CAPS, Take 1 capsule by mouth, Disp: , Rfl:     cycloSPORINE (RESTASIS) 0 05 % ophthalmic emulsion, Apply 1 drop to eye every 12 (twelve) hours , Disp: , Rfl:     diclofenac sodium (VOLTAREN) 1 %, Apply 2 g topically 4 (four) times a day, Disp: 6 Tube, Rfl: 3    furosemide (LASIX) 20 mg tablet, Take 1 tablet (20 mg total) by mouth daily as needed (SOB), Disp: 90 tablet, Rfl: 3    HYDROmorphone (DILAUDID) 2 mg tablet, Take 2 mg by mouth 4 (four) times a day , Disp: , Rfl:     Multiple Vitamins tablet, Take 1 tablet by mouth daily, Disp: , Rfl:     warfarin (COUMADIN) 2 5 mg tablet, Take 1 tablet (2 5 mg total) by mouth daily or as directed, Disp: 30 tablet, Rfl: 5  Allergies   Allergen Reactions    Iodine - Food Allergy Shortness Of Breath    Acyclovir     Buprenorphine GI Intolerance and Other (See Comments)    Hydrocodone-Acetaminophen      Vomiting    Hydrocodone-Acetaminophen      Vomiting  Vomiting    Iodinated Diagnostic Agents     Latex     Metrizamide     Naproxen GI Intolerance    Shellfish Allergy - Food Allergy     Glucosamine Rash    Medical Tape Rash    Other Other (See Comments)     red raised areas - please use paper tape    Shellfish-Derived Products - Food Allergy Rash       Review of Systems:  Review of Systems   Constitutional: Positive for appetite change and fatigue  Negative for activity change and unexpected weight change  Respiratory: Negative for cough, chest tightness, shortness of breath and wheezing  Cardiovascular: Positive for palpitations  Negative for chest pain and leg swelling  Gastrointestinal: Negative for abdominal pain, blood in stool, constipation and diarrhea  Genitourinary: Positive for frequency  Negative for dysuria, hematuria and urgency  Musculoskeletal: Positive for arthralgias, back pain and gait problem  Negative for joint swelling     Neurological: Negative for dizziness, syncope, speech difficulty, light-headedness and headaches  Psychiatric/Behavioral: Negative for agitation, behavioral problems, confusion and decreased concentration  Physical Exam:  Vitals:    05/14/21 1435   BP: 100/60   Pulse: 63   SpO2: 94%   Weight: 56 2 kg (123 lb 12 8 oz)   Height: 5' 1" (1 549 m)       Physical Exam  Constitutional:       General: She is not in acute distress  Appearance: She is normal weight  She is not ill-appearing  HENT:      Head: Normocephalic and atraumatic  Mouth/Throat:      Mouth: Mucous membranes are moist       Pharynx: No oropharyngeal exudate or posterior oropharyngeal erythema  Eyes:      General: No scleral icterus  Conjunctiva/sclera: Conjunctivae normal    Neck:      Musculoskeletal: Neck supple  Thyroid: No thyroid mass or thyromegaly  Vascular: Normal carotid pulses  No carotid bruit or JVD  Cardiovascular:      Rate and Rhythm: Normal rate and regular rhythm  Pulses:           Dorsalis pedis pulses are 2+ on the right side and 2+ on the left side  Posterior tibial pulses are 2+ on the right side and 2+ on the left side  Heart sounds: Murmur present  Systolic (apical) murmur present with a grade of 3/6  No diastolic murmur  No friction rub  No gallop  Pulmonary:      Breath sounds: Normal breath sounds  No wheezing, rhonchi or rales  Abdominal:      Palpations: Abdomen is soft  There is no hepatomegaly, splenomegaly or mass  Tenderness: There is no abdominal tenderness  Musculoskeletal:         General: Deformity (kyphosis) present  No swelling  Skin:     General: Skin is warm and dry  Coloration: Skin is not jaundiced or pale  Findings: No bruising, erythema, lesion or rash  Neurological:      Mental Status: She is alert and oriented to person, place, and time  Cranial Nerves: No cranial nerve deficit  Sensory: No sensory deficit  Motor: No weakness     Psychiatric: Mood and Affect: Mood normal          Behavior: Behavior normal          Thought Content: Thought content normal          Judgment: Judgment normal          Discussion/Summary:  1  PAF-in NSR today    on atenolol for potential rate control  On warfarin for CVA prevention  2  HTN-well controlled atenolol 50 mg daily and amlodipine 2 5 mg   3  MR-possibly severe  Poor operative candidate  4  Chronic diastolic CHF  Galen Ingles well controlled on lasix 20 mg 5 x per week    will check BMP with next INR      FU 6 months      Donell Esposito MD

## 2021-05-21 NOTE — PROGRESS NOTES
5/21/21, staff message from dr Basim Chen, patient is planning steroid injection with [facility] pain management, no date provided    Per dr Basim Chen, ok to stop warfarin 7 days prior to procedure, restart post procedure at usual dose

## 2021-05-27 ENCOUNTER — TELEPHONE (OUTPATIENT)
Dept: LAB | Facility: HOSPITAL | Age: 86
End: 2021-05-27

## 2021-06-03 ENCOUNTER — ANTICOAG VISIT (OUTPATIENT)
Dept: CARDIOLOGY CLINIC | Facility: CLINIC | Age: 86
End: 2021-06-03

## 2021-06-03 ENCOUNTER — APPOINTMENT (OUTPATIENT)
Dept: LAB | Facility: HOSPITAL | Age: 86
End: 2021-06-03
Attending: INTERNAL MEDICINE
Payer: MEDICARE

## 2021-06-03 DIAGNOSIS — I50.32 CHRONIC DIASTOLIC (CONGESTIVE) HEART FAILURE (HCC): ICD-10-CM

## 2021-06-03 DIAGNOSIS — I48.91 ATRIAL FIBRILLATION, UNSPECIFIED TYPE (HCC): ICD-10-CM

## 2021-06-03 LAB
ANION GAP SERPL CALCULATED.3IONS-SCNC: 8 MMOL/L (ref 4–13)
BUN SERPL-MCNC: 20 MG/DL (ref 5–25)
CALCIUM SERPL-MCNC: 9.6 MG/DL (ref 8.3–10.1)
CHLORIDE SERPL-SCNC: 103 MMOL/L (ref 100–108)
CO2 SERPL-SCNC: 30 MMOL/L (ref 21–32)
CREAT SERPL-MCNC: 0.85 MG/DL (ref 0.6–1.3)
GFR SERPL CREATININE-BSD FRML MDRD: 61 ML/MIN/1.73SQ M
GLUCOSE SERPL-MCNC: 89 MG/DL (ref 65–140)
INR PPP: 2.21 (ref 0.84–1.19)
POTASSIUM SERPL-SCNC: 4.3 MMOL/L (ref 3.5–5.3)
PROTHROMBIN TIME: 24 SECONDS (ref 11.6–14.5)
SODIUM SERPL-SCNC: 141 MMOL/L (ref 136–145)

## 2021-06-03 PROCEDURE — 80048 BASIC METABOLIC PNL TOTAL CA: CPT

## 2021-06-03 PROCEDURE — 36415 COLL VENOUS BLD VENIPUNCTURE: CPT

## 2021-06-03 PROCEDURE — 85610 PROTHROMBIN TIME: CPT

## 2021-06-09 ENCOUNTER — TELEPHONE (OUTPATIENT)
Dept: LAB | Facility: HOSPITAL | Age: 86
End: 2021-06-09

## 2021-06-10 DIAGNOSIS — I48.91 ATRIAL FIBRILLATION, UNSPECIFIED TYPE (HCC): Primary | ICD-10-CM

## 2021-06-10 NOTE — TELEPHONE ENCOUNTER
10 Dawn   0855 - called Dr Nydia Patterson for a new PT INR, the current one expires before 1 July 1042 - Scheduled patient for 1 July morning, left a message on machine confirming appt

## 2021-06-16 ENCOUNTER — TELEPHONE (OUTPATIENT)
Dept: CARDIOLOGY CLINIC | Facility: CLINIC | Age: 86
End: 2021-06-16

## 2021-06-16 NOTE — TELEPHONE ENCOUNTER
Pt calling having swelling in her r foot  Nurse at [facility] thought maybe she increase or take a extra water pill/currently on lasix 20 mg 5 times weekly    Please advise

## 2021-06-16 NOTE — TELEPHONE ENCOUNTER
Called patient    isolated RLE edema    no SOB  Rowan Mcdowell wt is down    doubt CHF>  Ok to take lasix extra dosage x1    may need to see PCP if this persists

## 2021-06-18 ENCOUNTER — HOSPITAL ENCOUNTER (INPATIENT)
Facility: HOSPITAL | Age: 86
LOS: 3 days | Discharge: HOME HEALTH | DRG: 563 | End: 2021-06-21
Attending: EMERGENCY MEDICINE | Admitting: INTERNAL MEDICINE
Payer: MEDICARE

## 2021-06-18 ENCOUNTER — APPOINTMENT (EMERGENCY)
Dept: CT IMAGING | Facility: HOSPITAL | Age: 86
DRG: 563 | End: 2021-06-18
Payer: MEDICARE

## 2021-06-18 ENCOUNTER — APPOINTMENT (EMERGENCY)
Dept: RADIOLOGY | Facility: HOSPITAL | Age: 86
DRG: 563 | End: 2021-06-18
Payer: MEDICARE
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
## 2021-06-18 DIAGNOSIS — R53.1 WEAKNESS: ICD-10-CM

## 2021-06-18 DIAGNOSIS — S62.512A CLOSED FRACTURE OF BASE OF PROXIMAL PHALANX OF LEFT THUMB: ICD-10-CM

## 2021-06-18 DIAGNOSIS — S62.515A CLOSED NONDISPLACED FRACTURE OF PROXIMAL PHALANX OF LEFT THUMB, INITIAL ENCOUNTER: ICD-10-CM

## 2021-06-18 DIAGNOSIS — N39.0 ACUTE UTI: Primary | ICD-10-CM

## 2021-06-18 DIAGNOSIS — S62.509A THUMB FRACTURE: ICD-10-CM

## 2021-06-18 DIAGNOSIS — I48.0 PAF (PAROXYSMAL ATRIAL FIBRILLATION) (HCC): ICD-10-CM

## 2021-06-18 LAB
ALBUMIN SERPL BCP-MCNC: 3.3 G/DL (ref 3.5–5)
ALP SERPL-CCNC: 73 U/L (ref 46–116)
ALT SERPL W P-5'-P-CCNC: 19 U/L (ref 12–78)
ANION GAP SERPL CALCULATED.3IONS-SCNC: 8 MMOL/L (ref 4–13)
APTT PPP: 38 SECONDS (ref 23–37)
AST SERPL W P-5'-P-CCNC: 26 U/L (ref 5–45)
ATRIAL RATE: 94 BPM
BACTERIA UR QL AUTO: ABNORMAL /HPF
BASOPHILS # BLD AUTO: 0.02 THOUSANDS/ΜL (ref 0–0.1)
BASOPHILS NFR BLD AUTO: 0 % (ref 0–1)
BILIRUB SERPL-MCNC: 0.86 MG/DL (ref 0.2–1)
BILIRUB UR QL STRIP: NEGATIVE
BUN SERPL-MCNC: 20 MG/DL (ref 5–25)
CALCIUM ALBUM COR SERPL-MCNC: 9.7 MG/DL (ref 8.3–10.1)
CALCIUM SERPL-MCNC: 9.1 MG/DL (ref 8.3–10.1)
CHLORIDE SERPL-SCNC: 104 MMOL/L (ref 100–108)
CK SERPL-CCNC: 112 U/L (ref 26–192)
CLARITY UR: ABNORMAL
CO2 SERPL-SCNC: 30 MMOL/L (ref 21–32)
COLOR UR: YELLOW
CREAT SERPL-MCNC: 0.97 MG/DL (ref 0.6–1.3)
EOSINOPHIL # BLD AUTO: 0.02 THOUSAND/ΜL (ref 0–0.61)
EOSINOPHIL NFR BLD AUTO: 0 % (ref 0–6)
ERYTHROCYTE [DISTWIDTH] IN BLOOD BY AUTOMATED COUNT: 13.2 % (ref 11.6–15.1)
GFR SERPL CREATININE-BSD FRML MDRD: 52 ML/MIN/1.73SQ M
GLUCOSE SERPL-MCNC: 127 MG/DL (ref 65–140)
GLUCOSE UR STRIP-MCNC: NEGATIVE MG/DL
HCT VFR BLD AUTO: 38.5 % (ref 34.8–46.1)
HGB BLD-MCNC: 12.7 G/DL (ref 11.5–15.4)
HGB UR QL STRIP.AUTO: ABNORMAL
IMM GRANULOCYTES # BLD AUTO: 0.07 THOUSAND/UL (ref 0–0.2)
IMM GRANULOCYTES NFR BLD AUTO: 1 % (ref 0–2)
INR PPP: 2.37 (ref 0.84–1.19)
KETONES UR STRIP-MCNC: ABNORMAL MG/DL
LEUKOCYTE ESTERASE UR QL STRIP: ABNORMAL
LYMPHOCYTES # BLD AUTO: 0.65 THOUSANDS/ΜL (ref 0.6–4.47)
LYMPHOCYTES NFR BLD AUTO: 7 % (ref 14–44)
MCH RBC QN AUTO: 31.7 PG (ref 26.8–34.3)
MCHC RBC AUTO-ENTMCNC: 33 G/DL (ref 31.4–37.4)
MCV RBC AUTO: 96 FL (ref 82–98)
MONOCYTES # BLD AUTO: 0.55 THOUSAND/ΜL (ref 0.17–1.22)
MONOCYTES NFR BLD AUTO: 6 % (ref 4–12)
NEUTROPHILS # BLD AUTO: 8.58 THOUSANDS/ΜL (ref 1.85–7.62)
NEUTS SEG NFR BLD AUTO: 86 % (ref 43–75)
NITRITE UR QL STRIP: NEGATIVE
NON-SQ EPI CELLS URNS QL MICRO: ABNORMAL /HPF
NRBC BLD AUTO-RTO: 0 /100 WBCS
P AXIS: 92 DEGREES
PH UR STRIP.AUTO: 6.5 [PH]
PLATELET # BLD AUTO: 215 THOUSANDS/UL (ref 149–390)
PMV BLD AUTO: 8.9 FL (ref 8.9–12.7)
POTASSIUM SERPL-SCNC: 3.8 MMOL/L (ref 3.5–5.3)
PR INTERVAL: 170 MS
PROT SERPL-MCNC: 7.7 G/DL (ref 6.4–8.2)
PROT UR STRIP-MCNC: ABNORMAL MG/DL
PROTHROMBIN TIME: 25.4 SECONDS (ref 11.6–14.5)
QRS AXIS: 29 DEGREES
QRSD INTERVAL: 82 MS
QT INTERVAL: 350 MS
QTC INTERVAL: 437 MS
RBC # BLD AUTO: 4.01 MILLION/UL (ref 3.81–5.12)
RBC #/AREA URNS AUTO: ABNORMAL /HPF
SODIUM SERPL-SCNC: 142 MMOL/L (ref 136–145)
SP GR UR STRIP.AUTO: 1.02 (ref 1–1.03)
T WAVE AXIS: 55 DEGREES
TROPONIN I SERPL-MCNC: <0.02 NG/ML
UROBILINOGEN UR QL STRIP.AUTO: 0.2 E.U./DL
VENTRICULAR RATE: 94 BPM
WBC # BLD AUTO: 9.89 THOUSAND/UL (ref 4.31–10.16)
WBC #/AREA URNS AUTO: ABNORMAL /HPF

## 2021-06-18 PROCEDURE — 87086 URINE CULTURE/COLONY COUNT: CPT | Performed by: INTERNAL MEDICINE

## 2021-06-18 PROCEDURE — 71045 X-RAY EXAM CHEST 1 VIEW: CPT

## 2021-06-18 PROCEDURE — 81001 URINALYSIS AUTO W/SCOPE: CPT | Performed by: EMERGENCY MEDICINE

## 2021-06-18 PROCEDURE — 70450 CT HEAD/BRAIN W/O DYE: CPT

## 2021-06-18 PROCEDURE — 93010 ELECTROCARDIOGRAM REPORT: CPT | Performed by: INTERNAL MEDICINE

## 2021-06-18 PROCEDURE — 99223 1ST HOSP IP/OBS HIGH 75: CPT | Performed by: INTERNAL MEDICINE

## 2021-06-18 PROCEDURE — 82550 ASSAY OF CK (CPK): CPT | Performed by: EMERGENCY MEDICINE

## 2021-06-18 PROCEDURE — 85730 THROMBOPLASTIN TIME PARTIAL: CPT | Performed by: EMERGENCY MEDICINE

## 2021-06-18 PROCEDURE — 96361 HYDRATE IV INFUSION ADD-ON: CPT

## 2021-06-18 PROCEDURE — 73110 X-RAY EXAM OF WRIST: CPT

## 2021-06-18 PROCEDURE — 72125 CT NECK SPINE W/O DYE: CPT

## 2021-06-18 PROCEDURE — 96360 HYDRATION IV INFUSION INIT: CPT

## 2021-06-18 PROCEDURE — 93005 ELECTROCARDIOGRAM TRACING: CPT

## 2021-06-18 PROCEDURE — 85025 COMPLETE CBC W/AUTO DIFF WBC: CPT | Performed by: EMERGENCY MEDICINE

## 2021-06-18 PROCEDURE — 73130 X-RAY EXAM OF HAND: CPT

## 2021-06-18 PROCEDURE — 87186 SC STD MICRODIL/AGAR DIL: CPT | Performed by: INTERNAL MEDICINE

## 2021-06-18 PROCEDURE — 36415 COLL VENOUS BLD VENIPUNCTURE: CPT | Performed by: EMERGENCY MEDICINE

## 2021-06-18 PROCEDURE — 84484 ASSAY OF TROPONIN QUANT: CPT | Performed by: EMERGENCY MEDICINE

## 2021-06-18 PROCEDURE — 85610 PROTHROMBIN TIME: CPT | Performed by: EMERGENCY MEDICINE

## 2021-06-18 PROCEDURE — 29130 APPL FINGER SPLINT STATIC: CPT | Performed by: EMERGENCY MEDICINE

## 2021-06-18 PROCEDURE — 80053 COMPREHEN METABOLIC PANEL: CPT | Performed by: EMERGENCY MEDICINE

## 2021-06-18 PROCEDURE — 2W3HX1Z IMMOBILIZATION OF LEFT THUMB USING SPLINT: ICD-10-PCS | Performed by: EMERGENCY MEDICINE

## 2021-06-18 PROCEDURE — 99285 EMERGENCY DEPT VISIT HI MDM: CPT

## 2021-06-18 PROCEDURE — 99285 EMERGENCY DEPT VISIT HI MDM: CPT | Performed by: EMERGENCY MEDICINE

## 2021-06-18 RX ORDER — WARFARIN SODIUM 2.5 MG/1
2.5 TABLET ORAL
Status: DISCONTINUED | OUTPATIENT
Start: 2021-06-20 | End: 2021-06-21 | Stop reason: HOSPADM

## 2021-06-18 RX ORDER — WARFARIN SODIUM 2.5 MG/1
2.5 TABLET ORAL 3 TIMES WEEKLY
Status: CANCELLED | OUTPATIENT
Start: 2021-06-18

## 2021-06-18 RX ORDER — WARFARIN SODIUM 2.5 MG/1
1.25 TABLET ORAL
Status: DISCONTINUED | OUTPATIENT
Start: 2021-06-18 | End: 2021-06-18 | Stop reason: CLARIF

## 2021-06-18 RX ORDER — WARFARIN SODIUM 2.5 MG/1
2.5 TABLET ORAL
Status: CANCELLED | OUTPATIENT
Start: 2021-06-18

## 2021-06-18 RX ORDER — ACETAMINOPHEN 325 MG/1
650 TABLET ORAL EVERY 6 HOURS PRN
Status: DISCONTINUED | OUTPATIENT
Start: 2021-06-18 | End: 2021-06-21 | Stop reason: HOSPADM

## 2021-06-18 RX ORDER — FUROSEMIDE 20 MG/1
20 TABLET ORAL DAILY
Status: DISCONTINUED | OUTPATIENT
Start: 2021-06-19 | End: 2021-06-21 | Stop reason: HOSPADM

## 2021-06-18 RX ORDER — FUROSEMIDE 10 MG/ML
20 INJECTION INTRAMUSCULAR; INTRAVENOUS ONCE
Status: COMPLETED | OUTPATIENT
Start: 2021-06-18 | End: 2021-06-18

## 2021-06-18 RX ORDER — WARFARIN SODIUM 2.5 MG/1
2.5 TABLET ORAL
Status: DISCONTINUED | OUTPATIENT
Start: 2021-06-18 | End: 2021-06-18 | Stop reason: CLARIF

## 2021-06-18 RX ORDER — HYDROMORPHONE HYDROCHLORIDE 2 MG/1
2 TABLET ORAL ONCE
Status: COMPLETED | OUTPATIENT
Start: 2021-06-18 | End: 2021-06-18

## 2021-06-18 RX ORDER — WARFARIN SODIUM 2.5 MG/1
1.25 TABLET ORAL
Status: DISCONTINUED | OUTPATIENT
Start: 2021-06-18 | End: 2021-06-21 | Stop reason: HOSPADM

## 2021-06-18 RX ORDER — AMLODIPINE BESYLATE 2.5 MG/1
2.5 TABLET ORAL DAILY
Status: DISCONTINUED | OUTPATIENT
Start: 2021-06-19 | End: 2021-06-19

## 2021-06-18 RX ORDER — ONDANSETRON 2 MG/ML
4 INJECTION INTRAMUSCULAR; INTRAVENOUS EVERY 4 HOURS PRN
Status: DISCONTINUED | OUTPATIENT
Start: 2021-06-18 | End: 2021-06-21 | Stop reason: HOSPADM

## 2021-06-18 RX ORDER — MELATONIN
1000 DAILY
Status: DISCONTINUED | OUTPATIENT
Start: 2021-06-19 | End: 2021-06-21 | Stop reason: HOSPADM

## 2021-06-18 RX ORDER — ATENOLOL 50 MG/1
50 TABLET ORAL ONCE
Status: COMPLETED | OUTPATIENT
Start: 2021-06-18 | End: 2021-06-18

## 2021-06-18 RX ORDER — HYDROMORPHONE HYDROCHLORIDE 2 MG/1
2 TABLET ORAL 4 TIMES DAILY
Status: DISCONTINUED | OUTPATIENT
Start: 2021-06-18 | End: 2021-06-21 | Stop reason: HOSPADM

## 2021-06-18 RX ORDER — ATENOLOL 50 MG/1
50 TABLET ORAL DAILY
Status: DISCONTINUED | OUTPATIENT
Start: 2021-06-19 | End: 2021-06-21 | Stop reason: HOSPADM

## 2021-06-18 RX ADMIN — HYDROMORPHONE HYDROCHLORIDE 2 MG: 2 TABLET ORAL at 11:53

## 2021-06-18 RX ADMIN — FUROSEMIDE 20 MG: 10 INJECTION, SOLUTION INTRAVENOUS at 16:05

## 2021-06-18 RX ADMIN — GLYCERIN, HYPROMELLOSE, POLYETHYLENE GLYCOL 1 DROP: .2; .2; 1 LIQUID OPHTHALMIC at 21:30

## 2021-06-18 RX ADMIN — WARFARIN SODIUM 1.25 MG: 2.5 TABLET ORAL at 21:27

## 2021-06-18 RX ADMIN — HYDROMORPHONE HYDROCHLORIDE 2 MG: 2 TABLET ORAL at 18:16

## 2021-06-18 RX ADMIN — SODIUM CHLORIDE 1000 ML: 0.9 INJECTION, SOLUTION INTRAVENOUS at 09:50

## 2021-06-18 RX ADMIN — ATENOLOL 50 MG: 50 TABLET ORAL at 11:57

## 2021-06-18 RX ADMIN — CEFTRIAXONE SODIUM 1000 MG: 10 INJECTION, POWDER, FOR SOLUTION INTRAVENOUS at 16:09

## 2021-06-18 RX ADMIN — DICLOFENAC SODIUM 2 G: 10 GEL TOPICAL at 18:16

## 2021-06-18 NOTE — ASSESSMENT & PLAN NOTE
Wt Readings from Last 3 Encounters:   05/14/21 56 2 kg (123 lb 12 8 oz)   04/28/21 56 7 kg (125 lb)   03/09/21 57 2 kg (126 lb)     · Continue furosemide 20 mg daily

## 2021-06-18 NOTE — H&P
[address] [place] [address] 7/20/1932, 80 y o  female MRN: [id number]  Unit/Bed#: ED 22 Encounter: [id number]  Primary Care Provider: Jazmin Leong MD   Date and time admitted to hospital: 6/18/2021  9:15 AM    Assessment and Plan  * Thumb fracture  Assessment & Plan  · Fall with thumb fracture of non dominant upper extremity  · PT/OT to evaluate for discharge needs as patient resides independently at [facility]    UTI (urinary tract infection)  Assessment & Plan  · Possible UTI given debility  Continue empiric ceftriaxone  · Follow-up on urine culture    Paroxysmal A-fib [facility])  Assessment & Plan  · Paroxysmal atrial fibrillation currently in sinus rhythm  Continue atenolol and anticoagulated with warfarin alternating between 1 25 mg and 2 5 mg    Results from last 7 days   Lab Units 06/18/21  0936   INR  2 37*       Chronic pain  Assessment & Plan  · Confirmed on  continue hydromorphone 2 mg q i d  Chronic diastolic (congestive) heart failure (HCC)  Assessment & Plan  Wt Readings from Last 3 Encounters:   05/14/21 56 2 kg (123 lb 12 8 oz)   04/28/21 56 7 kg (125 lb)   03/09/21 57 2 kg (126 lb)     · Continue furosemide 20 mg daily    Benign essential hypertension  Assessment & Plan  · Continue amlodipine and atenolol      VTE Prophylaxis: Warfarin (Coumadin)  Code Status:  Level one full code  Anticipated Length of Stay:  Patient will be admitted on an Inpatient basis with an anticipated length of stay of  greater than 2 midnights  Justification for Hospital Stay: Thumb fracture  Total Time for Visit, including Counseling / Coordination of Care: xx mins  Greater than 50% of this total time spent on direct patient counseling and coordination of care  Chief Complaint:     Chief Complaint   Patient presents with    Fall     patient woke up to use bathroom in middle of the night   patient remembers ambulating to bathroom and states "just went down" patient unable to get up after falling  crawled over to phone to call 911     History of Present Illness:    Rickey Parsons is a 80 y o  female with a past medical history of atrial fibrillation hypertension and CHF who presents with fall  She lives independently at [employer]  This morning around 6:00 a m  she medical use the bathroom and just went down  Her legs were weak and she had crawl around until 9:00 a m  when she was able to get to a phone to call 911  In the emergency department she had multiple imaging with findings of only a left thumb fracture  She was also noted to have bacteriuria which she denies any fevers chills urinary frequency or dysuria  Denies any chest pain or shortness of breath  Review of Systems:  Review of Systems   Constitutional: Negative for chills, diaphoresis and fever  HENT: Negative for dental problem and facial swelling  Eyes: Negative for photophobia, pain and visual disturbance  Respiratory: Negative for shortness of breath, wheezing and stridor  Cardiovascular: Negative for chest pain and palpitations  Gastrointestinal: Negative for abdominal distention, abdominal pain, diarrhea, nausea and vomiting  Genitourinary: Negative for dysuria, hematuria and urgency  Musculoskeletal: Positive for back pain, joint swelling and myalgias  Skin: Negative for rash  Neurological: Positive for weakness  Negative for dizziness, seizures, speech difficulty, numbness and headaches  Psychiatric/Behavioral: Negative for agitation and suicidal ideas  The patient is not nervous/anxious  All other systems reviewed and are negative          Past Medical and Surgical History:   Past Medical History:   Diagnosis Date    Chronic pain     Hyperlipidemia     Hypertension     L1 vertebral fracture (HCC)     Osteoporosis     Paroxysmal A-fib (HCC)     TIA (transient ischemic attack)      Past Surgical History:   Procedure Laterality Date    BACK SURGERY Meds/Allergies: Allergies: Allergies   Allergen Reactions    Iodine - Food Allergy Shortness Of Breath    Acyclovir     Buprenorphine GI Intolerance and Other (See Comments)    Hydrocodone-Acetaminophen      Vomiting    Hydrocodone-Acetaminophen      Vomiting  Vomiting    Iodinated Diagnostic Agents     Latex     Metrizamide     Naproxen GI Intolerance    Shellfish Allergy - Food Allergy     Glucosamine Rash    Medical Tape Rash    Other Other (See Comments)     red raised areas - please use paper tape    Shellfish-Derived Products - Food Allergy Rash     Prior to Admission Medications   Prescriptions Last Dose Informant Patient Reported? Taking?    Cholecalciferol 2000 units CAPS  Self Yes Yes   Sig: Take 1 capsule by mouth   HYDROmorphone (DILAUDID) 2 mg tablet  Self Yes Yes   Sig: Take 2 mg by mouth 4 (four) times a day    Multiple Vitamins tablet  Self Yes Yes   Sig: Take 1 tablet by mouth daily   amLODIPine (NORVASC) 2 5 mg tablet  Self No Yes   Sig: Take 1 tablet (2 5 mg total) by mouth daily   atenolol (TENORMIN) 50 mg tablet  Self No Yes   Sig: Take 1 tablet (50 mg total) by mouth daily   cycloSPORINE (RESTASIS) 0 05 % ophthalmic emulsion  Self Yes Yes   Sig: Apply 1 drop to eye every 12 (twelve) hours    diclofenac sodium (VOLTAREN) 1 %  Self No Yes   Sig: Apply 2 g topically 4 (four) times a day   furosemide (LASIX) 20 mg tablet  Self No Yes   Sig: Take 1 tablet (20 mg total) by mouth daily as needed (SOB)   warfarin (COUMADIN) 2 5 mg tablet  Self No Yes   Sig: Take 1 tablet (2 5 mg total) by mouth daily or as directed      Facility-Administered Medications: None     Social History:     Social History     Socioeconomic History    Marital status: Single     Spouse name: Not on file    Number of children: Not on file    Years of education: Not on file    Highest education level: Not on file   Occupational History    Not on file   Tobacco Use    Smoking status: Former Smoker    Smokeless tobacco: Never Used   Vaping Use    Vaping Use: Never used   Substance and Sexual Activity    Alcohol use: Never    Drug use: Never    Sexual activity: Not on file   Other Topics Concern    Not on file   Social History Narrative    Not on file     Social Determinants of Health     Financial Resource Strain: Low Risk     Difficulty of Paying Living Expenses: Not hard at all   Food Insecurity: Unknown    Worried About Running Out of Food in the Last Year: Never true    [address] in the Last Year: Not on file   Transportation Needs: No Transportation Needs    Lack of Transportation (Medical): No    Lack of Transportation (Non-Medical): No   Physical Activity: Insufficiently Active    Days of Exercise per Week: 1 day    Minutes of Exercise per Session: 10 min   Stress: No Stress Concern Present    Feeling of Stress : Not at all   Social Connections: Moderately Isolated    Frequency of Communication with Friends and Family: More than three times a week    Frequency of Social Gatherings with Friends and Family: More than three times a week    Attends Islam Services: 1 to 4 times per year    Active Member of Clubs or Organizations: No    Attends Club or Organization Meetings: Never    Marital Status: Never    Intimate Partner Violence: Not At Risk    Fear of Current or Ex-Partner: No    Emotionally Abused: No    Physically Abused: No    Sexually Abused: No     Patient Pre-hospital Living Situation:  Lives alone  Patient Pre-hospital Level of Mobility:  Cane  Patient Pre-hospital Diet Restrictions:     Family History:  Family History   Problem Relation Age of Onset    Heart disease Mother      Physical Exam:   Vitals:   Blood Pressure: 139/69 (06/18/21 1601)  Pulse: 81 (06/18/21 1601)  Temperature: 98 3 °F (36 8 °C) (06/18/21 0954)  Temp Source: Oral (06/18/21 0954)  Respirations: 16 (06/18/21 1601)  SpO2: 95 % (06/18/21 1601)    Physical Exam  Vitals reviewed  Constitutional:       General: She is not in acute distress  Appearance: Normal appearance  HENT:      Head: Atraumatic  Mouth/Throat:      Mouth: Mucous membranes are moist       Pharynx: Oropharynx is clear  Eyes:      General: No scleral icterus  Extraocular Movements: Extraocular movements intact  Pupils: Pupils are equal, round, and reactive to light  Cardiovascular:      Rate and Rhythm: Normal rate and regular rhythm  Heart sounds: Normal heart sounds  Pulmonary:      Breath sounds: Decreased breath sounds present  No wheezing  Abdominal:      General: Bowel sounds are normal       Palpations: Abdomen is soft  Tenderness: There is no guarding or rebound  Musculoskeletal:         General: No swelling  Cervical back: Neck supple  Comments: Left hand splinted   Skin:     General: Skin is warm  Neurological:      Mental Status: She is alert and oriented to person, place, and time  Mental status is at baseline  Psychiatric:         Mood and Affect: Mood normal        Lab Results: I have personally reviewed pertinent reports      Results from last 7 days   Lab Units 06/18/21  0936   WBC Thousand/uL 9 89   HEMOGLOBIN g/dL 12 7   HEMATOCRIT % 38 5   PLATELETS Thousands/uL 215   NEUTROS PCT % 86*   LYMPHS PCT % 7*   MONOS PCT % 6   EOS PCT % 0     Results from last 7 days   Lab Units 06/18/21  0936   SODIUM mmol/L 142   POTASSIUM mmol/L 3 8   CHLORIDE mmol/L 104   CO2 mmol/L 30   ANION GAP mmol/L 8   BUN mg/dL 20   CREATININE mg/dL 0 97   CALCIUM mg/dL 9 1   ALBUMIN g/dL 3 3*   TOTAL BILIRUBIN mg/dL 0 86   ALK PHOS U/L 73   ALT U/L 19   AST U/L 26   EGFR ml/min/1 73sq m 52   GLUCOSE RANDOM mg/dL 127     Results from last 7 days   Lab Units 06/18/21  0936   INR  2 37*     Results from last 7 days   Lab Units 06/18/21  0936   CK TOTAL U/L 112   TROPONIN I ng/mL <0 02                  Results from last 7 days   Lab Units 06/18/21  1457   COLOR UA  Yellow   CLARITY UA Cloudy   SPEC GRAV UA  1 020   PH UA  6 5   LEUKOCYTES UA  Large*   NITRITE UA  Negative   GLUCOSE UA mg/dl Negative   KETONES UA mg/dl Trace*   BILIRUBIN UA  Negative   BLOOD UA  Large*      Results from last 7 days   Lab Units 06/18/21  1457   RBC UA /hpf 4-10*   WBC UA /hpf Innumerable*   EPITHELIAL CELLS WET PREP /hpf None Seen   BACTERIA UA /hpf Occasional          Imaging: I have personally reviewed pertinent films in PACS  XR chest 1 view portable  Result Date: 6/18/2021  Impression: Persistent cardiomegaly  Increased vascular congestion  Workstation performed: [id number]     XR wrist 3+ views LEFT  Result Date: 6/18/2021  Impression: 1st proximal phalanx fracture again noted  Workstation performed: [id number]     XR hand 3+ views LEFT  Result Date: 6/18/2021  Impression: 1st proximal phalanx fracture The study was marked in EPIC for immediate notification  Workstation performed: [id number]     TRAUMA - CT head wo contrast    Result Date: 6/18/2021  Impression: No acute intracranial abnormality  Nonemergent findings above  Workstation performed: [id number]     TRAUMA - CT spine cervical wo contrast    Result Date: 6/18/2021  Impression: No cervical spine fracture or traumatic malalignment  Nonemergent findings above  Workstation performed: [id number]       EKG, Pathology, and Other Studies Reviewed on Admission:   EKG  Result Date: 06/18/21  Personally reviewed strips with impression of:  Normal sinus rhythm 94 bpm    Allscripts/ Epic Records Reviewed: Yes    ** Please Note: This note has been constructed using a voice recognition system   **

## 2021-06-18 NOTE — ED NOTES
Report called to Lula Tuttle RN, [facility], at this time       Sheila TINO Stokes  06/18/21 3202

## 2021-06-18 NOTE — ED NOTES
Pt ambulated by self and ED tech, using a cane pt extremely slow going and unsteady on her feet  Pt given a walker for ambulation pt did better with walker but still unsteady on her feet        Chris Ramirez RN  06/18/21 8840

## 2021-06-18 NOTE — ASSESSMENT & PLAN NOTE
· Fall with thumb fracture of non dominant upper extremity  · PT/OT to evaluate for discharge needs as patient resides independently at [facility]

## 2021-06-18 NOTE — ED PROVIDER NOTES
Emergency Department Trauma Note  Logan Aguilar 80 y o  female MRN: [id number]  Unit/Bed#: ED 22/ED 22 Encounter: [id number]      Trauma Alert:    Model of Arrival: Mode of Arrival: ALS via    Trauma Team: Current Providers  Attending Provider: Asael Blank DO  Attending Provider: Chioma Joseph DO  Registered Nurse: Priscilla Moya RN  Registered Nurse: Carlos Mesa RN  ED Technician: Amrit Venegas  ED Technician: Jacqueline Gaucher  Consultants: None      History of Present Illness     Chief Complaint:   Chief Complaint   Patient presents with   Grayson Abt Fall     patient woke up to use bathroom in middle of the night  patient remembers ambulating to bathroom and states "just went down" patient unable to get up after falling  crawled over to phone to call 911     HPI:  Logan Aguilar is a 80 y o  female who presents with fall  Mechanism:           88y F from home  Got up ?around [id number] to use the bathroom  Grabbed her cane from the door handle and walked to the bathroom and states she "just went down" when she got to the bathroom  States she remembers falling but doesn't know why she fell  Denies lh, cp, sob, unilateral numbness or weakness  +hit her head, no LOC  Was unable to get up so gradually scooted herself from the bathroom to the bedroom  Tried pulling herself up into bed off of the floor but was unsuccessful  Pulled pillow/blankets onto the floor and tried getting comfortable  Finally around 0830 scooted herself over to a phone and called for help  +OAC - coumadin for afib    Denies ha/head pain, c/o mouth is very dry  Denies neck pain  Has chronic back pain that is unchanged  Has some mild chest pain "where that paramedic pushed on it" - pt unable to explain further  Denies sob  Denies abd pain, no n/v  Denies any hip/LE pain   Is RHD, reports left thumb pain and swelling  Denies other upper extremity injury  History provided by:  Patient and EMS personnel   used:  No Fall  Mechanism of injury: fall    Injury location:  Hand  Hand injury location:  L fingers  Incident location:  Home  Time since incident: approx 3-4h  Arrived directly from scene: yes    Fall:     Fall occurred:  Standing    Height of fall:  Ground level fall    Impact surface:  Hard floor    Point of impact:  Unable to specify  Protective equipment: none    Suspicion of alcohol use: no    Suspicion of drug use: no    Prior to arrival data:     Blood loss:  None    Responsiveness at scene:  Alert    Orientation at scene:  Person, place, situation and time    Amnesic to event: no      Medications administered:  None    Immobilization:  C-collar  Associated symptoms: back pain (chronic and unchanged) and chest pain    Associated symptoms: no abdominal pain, no difficulty breathing, no headaches, no nausea, no neck pain and no vomiting    Risk factors: anticoagulation therapy      Review of Systems   Cardiovascular: Positive for chest pain  Gastrointestinal: Negative for abdominal pain, nausea and vomiting  Musculoskeletal: Positive for back pain (chronic and unchanged)  Negative for neck pain  Neurological: Negative for headaches  All other systems reviewed and are negative        Historical Information     Immunizations:   Immunization History   Administered Date(s) Administered    SARS-CoV-2 / COVID-19 mRNA IM (Pf[employer]) 01/21/2021, 02/11/2021       Past Medical History:   Diagnosis Date    Hyperlipidemia     Hypertension     L1 vertebral fracture (HCC)     Osteoporosis     Paroxysmal A-fib (HCC)     TIA (transient ischemic attack)        Family History   Problem Relation Age of Onset    Heart disease Mother      Past Surgical History:   Procedure Laterality Date    BACK SURGERY       Social History     Tobacco Use    Smoking status: Former Smoker    Smokeless tobacco: Never Used   Vaping Use    Vaping Use: Never used   Substance Use Topics    Alcohol use: Never    Drug use: Never E-Cigarette/Vaping    E-Cigarette Use Never User      E-Cigarette/Vaping Substances    Nicotine No     THC No     Flavoring No     Other No     Unknown No        Family History: non-contributory    Meds/Allergies   Prior to Admission Medications   Prescriptions Last Dose Informant Patient Reported? Taking?    Cholecalciferol 2000 units CAPS  Self Yes Yes   Sig: Take 1 capsule by mouth   HYDROmorphone (DILAUDID) 2 mg tablet  Self Yes Yes   Sig: Take 2 mg by mouth 4 (four) times a day    Multiple Vitamins tablet  Self Yes Yes   Sig: Take 1 tablet by mouth daily   amLODIPine (NORVASC) 2 5 mg tablet  Self No Yes   Sig: Take 1 tablet (2 5 mg total) by mouth daily   atenolol (TENORMIN) 50 mg tablet  Self No Yes   Sig: Take 1 tablet (50 mg total) by mouth daily   cycloSPORINE (RESTASIS) 0 05 % ophthalmic emulsion  Self Yes Yes   Sig: Apply 1 drop to eye every 12 (twelve) hours    diclofenac sodium (VOLTAREN) 1 %  Self No Yes   Sig: Apply 2 g topically 4 (four) times a day   furosemide (LASIX) 20 mg tablet  Self No Yes   Sig: Take 1 tablet (20 mg total) by mouth daily as needed (SOB)   warfarin (COUMADIN) 2 5 mg tablet  Self No Yes   Sig: Take 1 tablet (2 5 mg total) by mouth daily or as directed      Facility-Administered Medications: None       Allergies   Allergen Reactions    Iodine - Food Allergy Shortness Of Breath    Acyclovir     Buprenorphine GI Intolerance and Other (See Comments)    Hydrocodone-Acetaminophen      Vomiting    Hydrocodone-Acetaminophen      Vomiting  Vomiting    Iodinated Diagnostic Agents     Latex     Metrizamide     Naproxen GI Intolerance    Shellfish Allergy - Food Allergy     Glucosamine Rash    Medical Tape Rash    Other Other (See Comments)     red raised areas - please use paper tape    Shellfish-Derived Products - Food Allergy Rash       PHYSICAL EXAM    PE limited by: n/a    Objective   Vitals:   First set: Temperature: 98 3 °F (36 8 °C) (06/18/21 0954)  Pulse: 93 (06/18/21 9076)  Respirations: 18 (06/18/21 9050)  Blood Pressure: 146/70 (06/18/21 0922)  SpO2: 94 % (06/18/21 0922)    Primary Survey:   (A) Airway: intact  (B) Breathing: clear b/l - no sig chest wall ttp/crepitus  (C) Circulation: Pulses:   normal  (D) Disabliity:  GCS Total:  15  (E) Expose:  Completed    Secondary Survey: (Click on Physical Exam tab above)  Physical Exam  Vitals and nursing note reviewed  Constitutional:       Appearance: She is not ill-appearing  Comments: Elderly, frail appearing     HENT:      Head: Normocephalic and atraumatic  Nose: Nose normal       Mouth/Throat:      Mouth: Mucous membranes are dry  Eyes:      Extraocular Movements: Extraocular movements intact  Neck:        Comments: C-collar in place upon my initial evaluation)  Cardiovascular:      Rate and Rhythm: Normal rate and regular rhythm  Pulmonary:      Effort: Pulmonary effort is normal       Breath sounds: Normal breath sounds  Chest:      Chest wall: Tenderness (mid-chest, sternal region  no crepitus or step off ) present  Abdominal:      General: Abdomen is flat  Palpations: Abdomen is soft  Tenderness: There is no abdominal tenderness  Musculoskeletal:      Left hand: Swelling, deformity, tenderness and bony tenderness present  Decreased range of motion  Normal strength  Normal sensation  Normal capillary refill  Normal pulse  Arms:       Cervical back: Spinous process tenderness and muscular tenderness present  Right hip: Normal       Left hip: Normal       Left knee: Ecchymosis present  No swelling, deformity or bony tenderness  No tenderness  Legs:    Skin:     General: Skin is warm  Capillary Refill: Capillary refill takes 2 to 3 seconds  Coloration: Skin is pale  Neurological:      General: No focal deficit present  Mental Status: She is alert and oriented to person, place, and time     Psychiatric:         Mood and Affect: Mood normal  Cervical spine cleared by clinical criteria?  No (imaging required)      Invasive Devices     Peripheral Intravenous Line            Peripheral IV 06/18/21 Left Antecubital <1 day                Lab Results:   Results Reviewed     Procedure Component Value Units Date/Time    Urine Microscopic [[id number]]  (Abnormal) Collected: 06/18/21 1457    Lab Status: Final result Specimen: Urine, Straight Cath Updated: 06/18/21 1556     RBC, UA 4-10 /hpf      WBC, UA Innumerable /hpf      Epithelial Cells None Seen /hpf      Bacteria, UA Occasional /hpf     Urine culture [[id number]]     Lab Status: No result Specimen: Urine, Straight Cath     UA (URINE) with reflex to Scope [[id number]]  (Abnormal) Collected: 06/18/21 1457    Lab Status: Final result Specimen: Urine, Straight Cath Updated: 06/18/21 1511     Color, UA Yellow     Clarity, UA Cloudy     Specific Mims, UA 1 020     pH, UA 6 5     Leukocytes, UA Large     Nitrite, UA Negative     Protein,  (2+) mg/dl      Glucose, UA Negative mg/dl      Ketones, UA Trace mg/dl      Urobilinogen, UA 0 2 E U /dl      Bilirubin, UA Negative     Blood, UA Large    Comprehensive metabolic panel [[id number]]  (Abnormal) Collected: 06/18/21 0936    Lab Status: Final result Specimen: Blood from Arm, Left Updated: 06/18/21 1011     Sodium 142 mmol/L      Potassium 3 8 mmol/L      Chloride 104 mmol/L      CO2 30 mmol/L      ANION GAP 8 mmol/L      BUN 20 mg/dL      Creatinine 0 97 mg/dL      Glucose 127 mg/dL      Calcium 9 1 mg/dL      Corrected Calcium 9 7 mg/dL      AST 26 U/L      ALT 19 U/L      Alkaline Phosphatase 73 U/L      Total Protein 7 7 g/dL      Albumin 3 3 g/dL      Total Bilirubin 0 86 mg/dL      eGFR 52 ml/min/1 73sq m     Narrative:      Axel guidelines for Chronic Kidney Disease (CKD):     Stage 1 with normal or high GFR (GFR > 90 mL/min/1 73 square meters)    Stage 2 Mild CKD (GFR = 60-89 mL/min/1 73 square meters)    Stage 3A Moderate CKD (GFR = 45-59 mL/min/1 73 square meters)    Stage 3B Moderate CKD (GFR = 30-44 mL/min/1 73 square meters)    Stage 4 Severe CKD (GFR = 15-29 mL/min/1 73 square meters)    Stage 5 End Stage CKD (GFR <15 mL/min/1 73 square meters)  Note: GFR calculation is accurate only with a steady state creatinine    APTT [[id number]]  (Abnormal) Collected: 06/18/21 0936    Lab Status: Final result Specimen: Blood from Arm, Left Updated: 06/18/21 1010     PTT 38 seconds     Protime-INR [[id number]]  (Abnormal) Collected: 06/18/21 0936    Lab Status: Final result Specimen: Blood from Arm, Left Updated: 06/18/21 1010     Protime 25 4 seconds      INR 2 37    Troponin I [[id number]]  (Normal) Collected: 06/18/21 0936    Lab Status: Final result Specimen: Blood from Arm, Left Updated: 06/18/21 1007     Troponin I <0 02 ng/mL     CK Total with Reflex CKMB [[id number]]  (Normal) Collected: 06/18/21 0936    Lab Status: Final result Specimen: Blood from Arm, Left Updated: 06/18/21 1007     Total  U/L     CBC and differential [[id number]]  (Abnormal) Collected: 06/18/21 0936    Lab Status: Final result Specimen: Blood from Arm, Left Updated: 06/18/21 0943     WBC 9 89 Thousand/uL      RBC 4 01 Million/uL      Hemoglobin 12 7 g/dL      Hematocrit 38 5 %      MCV 96 fL      MCH 31 7 pg      MCHC 33 0 g/dL      RDW 13 2 %      MPV 8 9 fL      Platelets 656 Thousands/uL      nRBC 0 /100 WBCs      Neutrophils Relative 86 %      Immat GRANS % 1 %      Lymphocytes Relative 7 %      Monocytes Relative 6 %      Eosinophils Relative 0 %      Basophils Relative 0 %      Neutrophils Absolute 8 58 Thousands/µL      Immature Grans Absolute 0 07 Thousand/uL      Lymphocytes Absolute 0 65 Thousands/µL      Monocytes Absolute 0 55 Thousand/µL      Eosinophils Absolute 0 02 Thousand/µL      Basophils Absolute 0 02 Thousands/µL                  Imaging Studies:   Direct to CT: No  XR hand 3+ views LEFT   ED Interpretation by Cynthia Dunaway DO [place] (06/18 1113)   See below      Final Result by Ag Sahu MD (06/18 1102)      1st proximal phalanx fracture      The study was marked in EPIC for immediate notification  Workstation performed: [id number]         XR wrist 3+ views LEFT   ED Interpretation by Vandana Sorenson DO (06/18 1141)   See below      Final Result by Ag Sahu MD (06/18 1105)      1st proximal phalanx fracture again noted  Workstation performed: [id number]         XR chest 1 view portable   ED Interpretation by Vandana Sorenson DO (06/18 1529)   See below      Final Result by Maureen Ramey MD (06/18 1356)   Persistent cardiomegaly  Increased vascular congestion  Workstation performed: [id number]         TRAUMA - CT head wo contrast   ED Interpretation by Vandana Sorenson DO (06/18 1018)   See below      Final Result by Santy Livingston MD (06/18 0957)      No acute intracranial abnormality  Nonemergent findings above  Workstation performed: [id number]         TRAUMA - CT spine cervical wo contrast   ED Interpretation by Vandana Sorenson DO (06/18 1018)   See below      Final Result by Santy Livingston MD (06/18 1000)      No cervical spine fracture or traumatic malalignment  Nonemergent findings above                     Workstation performed: [id number]               Procedures  ECG 12 Lead Documentation Only    Date/Time: 6/18/2021 9:35 AM  Performed by: Vandana Sorenson DO  Authorized by: Vandana Sorenson DO     ECG reviewed by me, the ED Provider: yes    Patient location:  ED  Previous ECG:     Previous ECG:  Compared to current    Similarity:  Changes noted  Interpretation:     Interpretation: non-specific    Rate:     ECG rate:  94    ECG rate assessment: normal    Rhythm:     Rhythm: sinus rhythm    Ectopy:     Ectopy: none    Conduction:     Conduction: normal    ST segments:     ST segments:  Normal  T waves:     T waves: normal      Splint application    Date/Time: 6/18/2021 11:15 AM  Performed by: Rosa Farley DO  Authorized by: Rosa Farley DO   Universal Protocol:  Procedure performed by:  Consent: Verbal consent obtained  Consent given by: patient  Patient identity confirmed: verbally with patient      Pre-procedure details:     Sensation:  Normal  Procedure details:     Laterality:  Left    Location:  Finger    Finger:  L thumb    Strapping: no      Splint type:  Thumb spica    Supplies:  Cotton padding, Ortho-Glass and sling  Post-procedure details:     Pain:  Improved    Sensation:  Normal    Patient tolerance of procedure: Tolerated well, no immediate complications             ED Course  ED Course as of Jun 18 1558   Fri Jun 18, 2021   1013 INR(!): 2 37   1138 Pt lives alone but states "I have very concerned neighbors"  Pt asking for her normal meds - including po dilaudid that she takes for her chronic pain / compression fractures    Ordered home meds, as well as diet  Once pts chronic pain more under control w/ home med will attempt to ambulate w/ cane (uses at home w/ right hand) to see if pt would be safe to discharge  C-collar removed, no midline ttp, FROM w/ no neuro findings  C-spine cleared      1351 Per nursing, very unsteady and slow w/ cane  Tried w/ walker (brought up to a good height to let splint rest) and did better  Pt's lunch here  Will d/w pt once she has eaten  Would recommend admission / possible       1520 Leukocytes, UA(!): Large   1520 Ketones, UA(!): Trace   1520 Awaiting micro, but likely has UTI    Will d/w pt once micro   Blood, UA(!): Large   1558 ALT: 19           MDM  Number of Diagnoses or Management Options  Acute UTI: new and requires workup  Closed fracture of base of proximal phalanx of left thumb: new and requires workup  Fall from standing: new and requires workup  Weakness: new and requires workup     Amount and/or Complexity of Data Reviewed  Clinical lab tests: reviewed and ordered  Tests in the radiology section of CPT®: reviewed and ordered  Tests in the medicine section of CPT®: reviewed and ordered  Decide to obtain previous medical records or to obtain history from someone other than the patient: yes  Independent visualization of images, tracings, or specimens: yes            Disposition  [facility] Transfer: No  Final diagnoses:   Acute UTI   Weakness   Closed fracture of base of proximal phalanx of left thumb   Fall from standing     Time reflects when diagnosis was documented in both MDM as applicable and the Disposition within this note     Time User Action Codes Description Comment    6/18/2021  3:45 PM Maria Luisa Mi Add [N39 0] Acute UTI     6/18/2021  3:45 PM Maria Luisa Mi Add [R53 1] Weakness     6/18/2021  3:45 PM Laila Mi Closed displaced fracture of proximal phalanx of left thumb     6/18/2021  3:45 PM Maria Luisa Mi Remove [S62 512A] Closed displaced fracture of proximal phalanx of left thumb     6/18/2021  3:46 PM Maria Luisa Mi Add [S62 512A] Closed fracture of base of proximal phalanx of left thumb     6/18/2021  3:46 PM Pranav Mi Ave Add [W19  XXXA] Fall from standing       ED Disposition     ED Disposition Condition Date/Time Comment    Admit Stable Fri Jun 18, 2021  3:45 PM Case was discussed with Dr Lj Manzanares and the patient's admission status was agreed to be Admission Status: inpatient status to the service of Dr Lj Manzanares   Follow-up Information    None       Patient's Medications   Discharge Prescriptions    No medications on file     No discharge procedures on file      PDMP Review       Value Time User    PDMP Reviewed  Yes 6/24/2020  9:22 AM Rajan Guidry MD          ED Provider  Electronically Signed by         Cheko Almonte DO  06/18/21 8669

## 2021-06-18 NOTE — ASSESSMENT & PLAN NOTE
· Paroxysmal atrial fibrillation currently in sinus rhythm    Continue atenolol and anticoagulated with warfarin alternating between 1 25 mg and 2 5 mg    Results from last 7 days   Lab Units 06/18/21  0936   INR  2 37*

## 2021-06-19 LAB
ALBUMIN SERPL BCP-MCNC: 2.6 G/DL (ref 3.5–5)
ALP SERPL-CCNC: 60 U/L (ref 46–116)
ALT SERPL W P-5'-P-CCNC: 18 U/L (ref 12–78)
ANION GAP SERPL CALCULATED.3IONS-SCNC: 10 MMOL/L (ref 4–13)
AST SERPL W P-5'-P-CCNC: 25 U/L (ref 5–45)
BILIRUB SERPL-MCNC: 0.66 MG/DL (ref 0.2–1)
BUN SERPL-MCNC: 18 MG/DL (ref 5–25)
CALCIUM ALBUM COR SERPL-MCNC: 9.7 MG/DL (ref 8.3–10.1)
CALCIUM SERPL-MCNC: 8.6 MG/DL (ref 8.3–10.1)
CHLORIDE SERPL-SCNC: 104 MMOL/L (ref 100–108)
CO2 SERPL-SCNC: 28 MMOL/L (ref 21–32)
CREAT SERPL-MCNC: 0.89 MG/DL (ref 0.6–1.3)
ERYTHROCYTE [DISTWIDTH] IN BLOOD BY AUTOMATED COUNT: 13.2 % (ref 11.6–15.1)
GFR SERPL CREATININE-BSD FRML MDRD: 58 ML/MIN/1.73SQ M
GLUCOSE SERPL-MCNC: 111 MG/DL (ref 65–140)
HCT VFR BLD AUTO: 33.2 % (ref 34.8–46.1)
HGB BLD-MCNC: 11.1 G/DL (ref 11.5–15.4)
INR PPP: 3.05 (ref 0.84–1.19)
MCH RBC QN AUTO: 31.9 PG (ref 26.8–34.3)
MCHC RBC AUTO-ENTMCNC: 33.4 G/DL (ref 31.4–37.4)
MCV RBC AUTO: 95 FL (ref 82–98)
PLATELET # BLD AUTO: 185 THOUSANDS/UL (ref 149–390)
PMV BLD AUTO: 8.7 FL (ref 8.9–12.7)
POTASSIUM SERPL-SCNC: 3.2 MMOL/L (ref 3.5–5.3)
PROT SERPL-MCNC: 6.7 G/DL (ref 6.4–8.2)
PROTHROMBIN TIME: 30.9 SECONDS (ref 11.6–14.5)
RBC # BLD AUTO: 3.48 MILLION/UL (ref 3.81–5.12)
SODIUM SERPL-SCNC: 142 MMOL/L (ref 136–145)
WBC # BLD AUTO: 8.85 THOUSAND/UL (ref 4.31–10.16)

## 2021-06-19 PROCEDURE — 97166 OT EVAL MOD COMPLEX 45 MIN: CPT

## 2021-06-19 PROCEDURE — 97163 PT EVAL HIGH COMPLEX 45 MIN: CPT

## 2021-06-19 PROCEDURE — 99232 SBSQ HOSP IP/OBS MODERATE 35: CPT | Performed by: STUDENT IN AN ORGANIZED HEALTH CARE EDUCATION/TRAINING PROGRAM

## 2021-06-19 PROCEDURE — 85027 COMPLETE CBC AUTOMATED: CPT | Performed by: INTERNAL MEDICINE

## 2021-06-19 PROCEDURE — 80053 COMPREHEN METABOLIC PANEL: CPT | Performed by: INTERNAL MEDICINE

## 2021-06-19 PROCEDURE — 85610 PROTHROMBIN TIME: CPT | Performed by: INTERNAL MEDICINE

## 2021-06-19 RX ORDER — AMLODIPINE BESYLATE 2.5 MG/1
2.5 TABLET ORAL DAILY
Status: DISCONTINUED | OUTPATIENT
Start: 2021-06-20 | End: 2021-06-19

## 2021-06-19 RX ORDER — AMLODIPINE BESYLATE 2.5 MG/1
2.5 TABLET ORAL DAILY
Status: DISCONTINUED | OUTPATIENT
Start: 2021-06-19 | End: 2021-06-21 | Stop reason: HOSPADM

## 2021-06-19 RX ORDER — WARFARIN SODIUM 2.5 MG/1
1.25 TABLET ORAL DAILY
COMMUNITY
End: 2021-06-21 | Stop reason: HOSPADM

## 2021-06-19 RX ADMIN — CEFTRIAXONE SODIUM 1000 MG: 10 INJECTION, POWDER, FOR SOLUTION INTRAVENOUS at 17:36

## 2021-06-19 RX ADMIN — HYDROMORPHONE HYDROCHLORIDE 2 MG: 2 TABLET ORAL at 09:37

## 2021-06-19 RX ADMIN — HYDROMORPHONE HYDROCHLORIDE 2 MG: 2 TABLET ORAL at 17:36

## 2021-06-19 RX ADMIN — GLYCERIN, HYPROMELLOSE, POLYETHYLENE GLYCOL 1 DROP: .2; .2; 1 LIQUID OPHTHALMIC at 09:37

## 2021-06-19 RX ADMIN — DICLOFENAC SODIUM 2 G: 10 GEL TOPICAL at 12:55

## 2021-06-19 RX ADMIN — HYDROMORPHONE HYDROCHLORIDE 2 MG: 2 TABLET ORAL at 23:19

## 2021-06-19 RX ADMIN — WARFARIN SODIUM 1.25 MG: 2.5 TABLET ORAL at 20:20

## 2021-06-19 RX ADMIN — DICLOFENAC SODIUM 2 G: 10 GEL TOPICAL at 09:37

## 2021-06-19 RX ADMIN — GLYCERIN, HYPROMELLOSE, POLYETHYLENE GLYCOL 1 DROP: .2; .2; 1 LIQUID OPHTHALMIC at 16:36

## 2021-06-19 RX ADMIN — HYDROMORPHONE HYDROCHLORIDE 2 MG: 2 TABLET ORAL at 01:10

## 2021-06-19 RX ADMIN — HYDROMORPHONE HYDROCHLORIDE 2 MG: 2 TABLET ORAL at 12:55

## 2021-06-19 RX ADMIN — DICLOFENAC SODIUM 2 G: 10 GEL TOPICAL at 17:36

## 2021-06-19 RX ADMIN — AMLODIPINE BESYLATE 2.5 MG: 2.5 TABLET ORAL at 20:53

## 2021-06-19 RX ADMIN — Medication 1000 UNITS: at 09:39

## 2021-06-19 NOTE — PLAN OF CARE
Problem: PHYSICAL THERAPY ADULT  Goal: Performs mobility at highest level of function for planned discharge setting  See evaluation for individualized goals  Description: Treatment/Interventions: Functional transfer training, LE strengthening/ROM, Therapeutic exercise, Cognitive reorientation, Patient/family training, Equipment eval/education, Gait training, Bed mobility, Compensatory technique education, Continued evaluation, Spoke to nursing, OT, Spoke to case management          See flowsheet documentation for full assessment, interventions and recommendations  Note: Prognosis: Good  Problem List: Decreased strength, Impaired balance, Decreased mobility, Decreased safety awareness, Orthopedic restrictions, Pain  Assessment: Yudith Valadez is a 80 y o  female admitted to [facility] on 6/18/2021 for Thumb fracture  Thumb spica applied in ED  PT was consulted and pt was seen on 6/19/2021 for mobility assessment and d/c planning  Pt presents w fall risk, pain  Somewhat of an inconsistent historian throughout session  Reports recently using Rollator for mobility and indep for ADLs  Pt is currently functioning at a supervision to min A for bed mobility, supervision for transfers and min A for ambulation w RW  Pt demonstrated significantly slow gait speeds contributing to higher fall risk  Require constant cuing for safe use of AD, as well as transfer technique  Additional cues to redirect attention and for safety awareness  Pt will benefit from continued skilled IP PT to address the above mentioned impairments  in order to maximize recovery and increase functional independence when completing mobility and ADLs  At this time PT recommendations for d/c are STR given social barriers, risk for falls and decline in function preventing safe dc home alone  End of session pt repositioned in bed, alarm engaged and all needs in reach  Encourage OOB for meals and ambulation to bathroom as tolerated    Barriers to Discharge: Decreased caregiver support  Barriers to Discharge Comments: lives alone, ?support from [address] [place] [address]     PT Discharge Recommendation: Post acute rehabilitation services     PT - OK to Discharge: Yes    See flowsheet documentation for full assessment

## 2021-06-19 NOTE — PLAN OF CARE
Problem: OCCUPATIONAL THERAPY ADULT  Goal: Performs self-care activities at highest level of function for planned discharge setting  See evaluation for individualized goals  Description: Treatment Interventions: ADL retraining, Functional transfer training, UE strengthening/ROM, Endurance training, Patient/family training, Equipment evaluation/education, Compensatory technique education, Continued evaluation, Activityengagement          See flowsheet documentation for full assessment, interventions and recommendations  Note: Limitation: Decreased ADL status, Decreased UE strength, Decreased Safe judgement during ADL, Decreased endurance, Decreased self-care trans, Decreased high-level ADLs, Decreased fine motor control  Prognosis: Good  Assessment: Pt is a 80 y o  female seen for OT evaluation s/p adm to [facility] on 6/18/2021 s/p fall and UTI  Imaging revealed 1st proximal phalanx fracture  Thumb spica split applied in ED  Comorbidities affecting pts functional performance include a significant PMH of Chronic pain, HLD, HTN, L1 vertebral fracture, Osteoporosis, Paroxysmal A-Fib, and TIA  Pt with active OT orders and activity orders for Up and OOB as tolerated   Pt lives alone at [address]  At baseline, pt was I w/ ADLs and functional mobility/transfers w/ use of SPC vs Rollator (recently using Rollator more frequently), required assist w/ IADLs, (-) , and reports 1 fall PTA  Upon evaluation, pt currently requires Supervision for UB ADLs, Mod-Min A for LB ADLs, Min A for toileting, Min A-Supervision for bed mobility, Supervision for transfers, and Min A for functional mobility/transfers 2* the following deficits impacting occupational performance: decreased strength, decreased balance, decreased tolerance, decreased safety awareness and increased pain   These impairments, as well at pts limited home support, difficulty performing ADLS, difficulty performing IADLS  and limited insight into deficits limit pts ability to safely engage in all baseline areas of occupation  The patient's raw score on the AM-PAC Daily Activity inpatient short form is 18, standardized score is 38 66, less than 39 4  Patients at this level are likely to benefit from discharge to post-acute rehabilitation services  Please refer to the recommendation of the Occupational Therapist for safe discharge planning  Based on the aforementioned OT evaluation, functional performance deficits, and assessments, pt has been identified as a Moderate complexity evaluation  Pt to continue to benefit from continued acute OT services during hospital stay to address defined deficits and to maximize level of functional independence in the following Occupational Performance areas: grooming, bathing/shower, toilet hygiene, dressing, medication management, health maintenance, functional mobility, community mobility, clothing management, cleaning, meal prep and household maintenance  From OT standpoint, recommend STR upon D/C   OT will continue to follow pt 3-5x/wk to address the following goals to  w/in 10-14 days:     OT Discharge Recommendation: Post acute rehabilitation services  OT - OK to Discharge: Yes (when medically cleared to rehab)

## 2021-06-19 NOTE — PLAN OF CARE
Problem: Potential for Falls  Goal: Patient will remain free of falls  Description: INTERVENTIONS:  - Educate patient/family on patient safety including physical limitations  - Instruct patient to call for assistance with activity   - Keep Call bell within reach  - Keep bed low and locked with side rails adjusted as appropriate  - Keep care items and personal belongings within reach  - Initiate and maintain comfort rounds  - Make Fall Risk Sign visible to staff  - Offer Toileting every 2 Hours, in advance of need  - Apply yellow socks and bracelet for high fall risk patients  - Consider moving patient to room near nurses station  Outcome: Progressing     Problem: MOBILITY - ADULT  Goal: Maintain or return to baseline ADL function  Description: INTERVENTIONS:  -  Assess patient's ability to carry out ADLs; assess patient's baseline for ADL function and identify physical deficits which impact ability to perform ADLs (bathing, care of mouth/teeth, toileting, grooming, dressing, etc )  - Assess/evaluate cause of self-care deficits   - Assess range of motion  - Assess patient's mobility; develop plan if impaired  - Assess patient's need for assistive devices and provide as appropriate  - Encourage maximum independence but intervene and supervise when necessary  - Involve family in performance of ADLs  - Assess for home care needs following discharge   - Provide patient education as appropriate  Outcome: Progressing  Goal: Maintains/Returns to pre admission functional level  Description: INTERVENTIONS:  - Perform BMAT or MOVE assessment daily    - Set and communicate daily mobility goal to care team and patient/family/caregiver  - Reposition patient every 2 hours    - Stand patient 2 times a day  - Ambulate patient 2 times a day  - Out of bed to chair 1 time a day   - Out of bed for toileting  - Record patient progress and toleration of activity level   Outcome: Progressing     Problem: PAIN - ADULT  Goal: Verbalizes/displays adequate comfort level or baseline comfort level  Description: Interventions:  - Encourage patient to monitor pain and request assistance  - Assess pain using appropriate pain scale  - Administer analgesics based on type and severity of pain and evaluate response  - Implement non-pharmacological measures as appropriate and evaluate response  - Consider cultural and social influences on pain and pain management  - Notify physician/advanced practitioner if interventions unsuccessful or patient reports new pain  Outcome: Progressing     Goal: Maintain or return to baseline ADL function  Description: INTERVENTIONS:  -  Assess patient's ability to carry out ADLs; assess patient's baseline for ADL function and identify physical deficits which impact ability to perform ADLs (bathing, care of mouth/teeth, toileting, grooming, dressing, etc )  - Assess/evaluate cause of self-care deficits   - Assess range of motion  - Assess patient's mobility; develop plan if impaired  - Assess patient's need for assistive devices and provide as appropriate  - Encourage maximum independence but intervene and supervise when necessary  - Involve family in performance of ADLs  - Assess for home care needs following discharge   - Consider OT consult to assist with ADL evaluation and planning for discharge  - Provide patient education as appropriate  Outcome: Progressing

## 2021-06-19 NOTE — ASSESSMENT & PLAN NOTE
Wt Readings from Last 3 Encounters:   06/18/21 58 kg (127 lb 13 9 oz)   05/14/21 56 2 kg (123 lb 12 8 oz)   04/28/21 56 7 kg (125 lb)     · Continue furosemide 20 mg daily

## 2021-06-19 NOTE — OCCUPATIONAL THERAPY NOTE
Occupational Therapy Evaluation     Patient Name: Cleveland Morales  RAHGS'L Date: 6/19/2021  Problem List  Principal Problem:    Thumb fracture  Active Problems:    Paroxysmal atrial fibrillation (HCC)    Benign essential hypertension    Chronic diastolic (congestive) heart failure (HCC)    Chronic pain    UTI (urinary tract infection)    Past Medical History  Past Medical History:   Diagnosis Date    Chronic pain     Hyperlipidemia     Hypertension     L1 vertebral fracture (HCC)     Osteoporosis     Paroxysmal A-fib (HCC)     TIA (transient ischemic attack)      Past Surgical History  Past Surgical History:   Procedure Laterality Date    BACK SURGERY             06/19/21 1046   Note Type   Note type Evaluation   Restrictions/Precautions   Weight Bearing Precautions Per Order No   Braces or Orthoses Splint  (thumb spica )   Other Precautions Chair Alarm; Bed Alarm; Fall Risk;Pain   Pain Assessment   Pain Assessment Tool 0-10   Pain Score 4   Pain Location/Orientation Orientation: Left; Other (Comment)  (hand-thumb)   Hospital Pain Intervention(s) Repositioned; Ambulation/increased activity; Emotional support; Rest   Multiple Pain Sites No   Home Living   Type of Home Other (Comment)  ([facility] [facility])   Home Layout One level  (0 KD)   Bathroom Shower/Tub Walk-in shower   Bathroom Toilet Raised   Bathroom Equipment Grab bars in shower;Built-in shower seat   Bathroom Accessibility Accessible   Home Equipment Cane;Reacher; Other (Comment)  (Rollator)   Additional Comments Pt lives alone at [address]  Prior Function   Level of [place] Independent with ADLs and functional mobility; Needs assistance with IADLs   Lives With Alone   Receives Help From Other (Comment)  (facility staff)   ADL Assistance Independent   IADLs Needs assistance  (Assist w/ cooking and transporation to appointments)   Falls in the last 6 months 1 to 4  (1 leading to admission)   Vocational Retired   Comments At baseline, pt was I w/ ADLs and functional mobility/transfers w/ use of SPC vs Rollator (recently using Rollator more frequently), required assist w/ IADLs, (-) , and reports 1 fall PTA  Pt reports I w/ laundry, light meal prep, and medication management  Receives assist w/ transportation and cooking   Lifestyle   Autonomy At baseline, pt was I w/ ADLs and functional mobility/transfers w/ use of SPC vs Rollator (recently using Rollator more frequently), required assist w/ IADLs, (-) , and reports 1 fall PTA   Reciprocal Relationships Facility staff   Service to Others Retired- Pharmacist    Intrinsic Gratification Watching TV   Psychosocial   Psychosocial (WDL) WDL   ADL   Where Miranda Estevez [phone]  Supervision/Setup   Grooming Assistance 5  [address] [facility] 5  Supervision/Setup    [place] [id number] 5  [address] 3  Moderate [id number] [facility] 4  Minimal Assistance   Additional Comments Reports increased difficulty completing R LE ADLs   Bed Mobility   Supine to Sit 5  Supervision   Additional items HOB elevated; Bedrails; Increased time required;Verbal cues   Sit to Supine 4  Minimal assistance   Additional items Assist x 1; Increased time required;Verbal cues;LE management   Additional Comments Pt lying supine at end of session w/ bed alarm activated  Call bell and phone within reach  All needs met and pt reports no further questions for OT at this time   Transfers   Sit to Stand 5  Supervision   Additional items Bedrails; Increased time required;Verbal cues   Stand to Sit 5  Supervision   Additional items Bedrails; Increased time required;Verbal cues   Toilet transfer 4  Minimal assistance   Additional items Assist x 1; Increased time required;Verbal cues;Standard toilet  (assist for sit>stand from toilet surface)   Additional Comments Cues for safe technique and hand placement with assist to initiate forward weight shift from surface for sit>stand   Functional Mobility   Functional Mobility 4  Minimal assistance   Additional Comments Assist x1   Additional items Rolling walker   Balance   Static Sitting Fair +   Dynamic Sitting Fair   Static Standing Fair   Dynamic Standing Fair -   Ambulatory Poor +   Activity Tolerance   Activity Tolerance Patient limited by fatigue;Treatment limited secondary to medical complications (Comment)   Medical Staff Made Aware Yumi Hickey PT   Nurse Made Aware yes; Ingrid Florence RN   RUE Assessment   RUE Assessment [facility]   RUE Strength   RUE Overall Strength Within Functional Limits - able to perform ADL tasks with strength  (4-/5 throughout)   LUE Assessment   LUE Assessment WFL   LUE Strength   LUE Overall Strength Within Functional Limits - able to perform ADL tasks with strength  (4-/5 throughout)   Hand Function   Gross Motor Coordination Functional   Fine Motor Coordination   (Impaired L 2* thumb spica splint)   Sensation   Light Touch No apparent deficits   Proprioception   Proprioception No apparent deficits   Vision - Complex Assessment   Acuity Able to read clock/calendar on wall without difficulty; Able to read employee name badge without difficulty   Additional Comments Reports recent cataract surgery, no longer wears glasses   Perception   Inattention/Neglect Appears intact   Cognition   Overall Cognitive Status [facility]   Arousal/Participation Alert; Cooperative   Attention Attends with cues to redirect   Orientation Level Oriented to person;Oriented to place;Oriented to time   Memory Decreased recall of precautions   Following Commands Follows one step commands with increased time or repetition   Comments Pleasant, cues for redirection  Limited insight into deficits    Assessment   Limitation Decreased ADL status; Decreased UE strength;Decreased Safe judgement during ADL;Decreased endurance;Decreased self-care trans;Decreased high-level ADLs; Decreased fine motor control   Prognosis Good   Assessment Pt is a 80 y o  female seen for OT evaluation s/p adm to [facility] on 6/18/2021 s/p fall and UTI  Imaging revealed 1st proximal phalanx fracture  Thumb spica split applied in ED  Comorbidities affecting pts functional performance include a significant PMH of Chronic pain, HLD, HTN, L1 vertebral fracture, Osteoporosis, Paroxysmal A-Fib, and TIA  Pt with active OT orders and activity orders for Up and OOB as tolerated   Pt lives alone at [address]  At baseline, pt was I w/ ADLs and functional mobility/transfers w/ use of SPC vs Rollator (recently using Rollator more frequently), required assist w/ IADLs, (-) , and reports 1 fall PTA  Upon evaluation, pt currently requires Supervision for UB ADLs, Mod-Min A for LB ADLs, Min A for toileting, Min A-Supervision for bed mobility, Supervision for transfers, and Min A for functional mobility/transfers 2* the following deficits impacting occupational performance: decreased strength, decreased balance, decreased tolerance, decreased safety awareness and increased pain  These impairments, as well at pts limited home support, difficulty performing ADLS, difficulty performing IADLS  and limited insight into deficits limit pts ability to safely engage in all baseline areas of occupation  The patient's raw score on the AM-PAC Daily Activity inpatient short form is 18, standardized score is 38 66, less than 39 4  Patients at this level are likely to benefit from discharge to post-acute rehabilitation services  Please refer to the recommendation of the Occupational Therapist for safe discharge planning  Based on the aforementioned OT evaluation, functional performance deficits, and assessments, pt has been identified as a Moderate complexity evaluation   Pt to continue to benefit from continued acute OT services during hospital stay to address defined deficits and to maximize level of functional independence in the following Occupational Performance areas: grooming, bathing/shower, toilet hygiene, dressing, medication management, health maintenance, functional mobility, community mobility, clothing management, cleaning, meal prep and household maintenance  From OT standpoint, recommend STR upon D/C  OT will continue to follow pt 3-5x/wk to address the following goals to  w/in 10-14 days:   Goals   Patient Goals To walk more   LTG Time Frame 10-14   Long Term Goal Please refer to LTGs listed below   Plan   Treatment Interventions ADL retraining;Functional transfer training;UE strengthening/ROM; Endurance training;Patient/family training;Equipment evaluation/education; Compensatory technique education;Continued evaluation; Activityengagement   Goal Expiration Date 21   OT Treatment Day 0   OT Frequency 3-5x/wk   Recommendation   OT Discharge Recommendation Post acute rehabilitation services   OT - OK to Discharge Yes  (when medically cleared to rehab)   AM-PAC Daily Activity Inpatient   Lower Body Dressing 2   Bathing 3   Toileting 3   Upper Body Dressing 3   Grooming 3   Eating 4   Daily Activity Raw Score 18   Daily Activity Standardized Score (Calc for Raw Score >=11) 38 66   AM-PAC Applied Cognition Inpatient   Following a Speech/Presentation 3   Understanding Ordinary Conversation 4   Taking Medications 3   Remembering Where Things Are Placed or Put Away 3   Remembering List of 4-5 Errands 3   Taking Care of Complicated Tasks 3   Applied Cognition Raw Score 19   Applied Cognition Standardized Score 39 77          GOALS    1  Pt will improve activity tolerance to G for min 30 min txment sessions for increase engagement in functional tasks    2  Pt will complete bed mobility at a Mod I level w/ G balance/safety demonstrated to decrease caregiver assistance required     3   Pt will complete UB dressing/self care w/ mod I using adaptive device and DME as needed 4  Pt will complete LB dressing/self care w/ mod I using adaptive device and DME as needed    5  Pt will complete toileting w/ mod I w/ G hygiene/thoroughness using DME as needed    6  Pt will improve functional transfers to Mod I on/off all surfaces using DME as needed w/ G balance/safety     7  Pt will improve functional mobility during ADL/IADL/leisure tasks to Mod I using DME as needed w/ G balance/safety     8  Pt will be attentive 100% of the time during ongoing cognitive assessment w/ G participation to assist w/ safe d/c planning/recommendations    9  Pt will demonstrate G carryover of pt/caregiver education and training as appropriate w/o cues w/ good tolerance to increase safety during functional tasks    10  Pt will participate in simulated IADL management task to increase independence to Mod I w/ G safety and endurance    11  Pt will increase BUE strength by 1MM grade via AROM exercises to increase independence in ADLs and transfers    12  Pt will verbalize 3 potential fall hazards and identify appropriate compensatory techniques to decrease fall risk in home environment     13   Pt will increase standing tolerance to 8-10 mins with Fair+ dynamic standing balance to increase safety during participation in ADLs         Arley [facility], OTR/L

## 2021-06-19 NOTE — PROGRESS NOTES
[address] [place] [address]  Progress Note - Peg Francisco 7/20/1932, 80 y o  female MRN: [id number]  Unit/Bed#: E2 -01 Encounter: [id number]  Primary Care Provider: Duke Jean-Baptiste MD   Date and time admitted to hospital: 6/18/2021  9:15 AM    * Thumb fracture  Assessment & Plan  80year old female suffered from a fall which resulted in thumb fracture of her left arm    - PT/OT eval pending    UTI (urinary tract infection)  Assessment & Plan  Continue empiric ceftriaxone  Await cultures    Chronic pain  Assessment & Plan  Continue hydromorphone 2 mg q i d  Chronic diastolic (congestive) heart failure (HCC)  Assessment & Plan  Wt Readings from Last 3 Encounters:   06/18/21 58 kg (127 lb 13 9 oz)   05/14/21 56 2 kg (123 lb 12 8 oz)   04/28/21 56 7 kg (125 lb)     · Continue furosemide 20 mg daily    Benign essential hypertension  Assessment & Plan  Continue amlodipine and atenolol    Paroxysmal atrial fibrillation (HCC)  Assessment & Plan  Rate controlled  - Continue once daily atenolol 50mg daily  - Anticoagulated with warfarin    Recent Labs     06/18/21  0936 06/19/21  0455   INR 2 37* 3 05*           VTE Pharmacologic Prophylaxis:   Pharmacologic: Warfarin (Coumadin)  Mechanical VTE Prophylaxis in Place: Yes    Patient Centered Rounds: I have performed bedside rounds with nursing staff today  Discussions with Specialists or Other Care Team Provider: Nursing    Education and Discussions with Family / Patient: patient    Time Spent for Care: 30 minutes  More than 50% of total time spent on counseling and coordination of care as described above  Current Length of Stay: 1 day(s)    Current Patient Status: Inpatient   Certification Statement: The patient will continue to require additional inpatient hospital stay due to iv antibiotics, pt/ot eval    Discharge Plan: active    Code Status: Level 3 - DNAR and DNI      Subjective:   Patient seen and examined at bedside  Comfortable   No new acute issues otherwise  Objective:     Vitals:   Temp (24hrs), Av 8 °F (36 6 °C), Min:97 2 °F (36 2 °C), Max:98 1 °F (36 7 °C)    Temp:  [97 2 °F (36 2 °C)-98 1 °F (36 7 °C)] 98 1 °F (36 7 °C)  HR:  [73-95] 73  Resp:  [16-20] 18  BP: ()/(56-71) 105/71  SpO2:  [91 %-98 %] 97 %  Body mass index is 24 16 kg/m²  Input and Output Summary (last 24 hours): Intake/Output Summary (Last 24 hours) at 2021 1147  Last data filed at 2021 1445  Gross per 24 hour   Intake 1000 ml   Output 200 ml   Net 800 ml       Physical Exam:     Physical Exam  Vitals reviewed  Constitutional:       General: She is not in acute distress  HENT:      Head: Normocephalic  Nose: Nose normal       Mouth/Throat:      Mouth: Mucous membranes are moist    Eyes:      General: No scleral icterus  Cardiovascular:      Rate and Rhythm: Normal rate and regular rhythm  Pulmonary:      Effort: Pulmonary effort is normal  No respiratory distress  Abdominal:      Palpations: Abdomen is soft  Tenderness: There is no abdominal tenderness  Musculoskeletal:      Right lower leg: No edema  Left lower leg: No edema  Comments: Left thumb dressing / cast in place   Skin:     General: Skin is warm  Neurological:      Mental Status: She is alert  Mental status is at baseline     Psychiatric:         Mood and Affect: Mood normal          Behavior: Behavior normal        Additional Data:     Labs:    Results from last 7 days   Lab Units 21  0455 21  0936   WBC Thousand/uL 8 85 9 89   HEMOGLOBIN g/dL 11 1* 12 7   HEMATOCRIT % 33 2* 38 5   PLATELETS Thousands/uL 185 215   NEUTROS PCT %  --  86*   LYMPHS PCT %  --  7*   MONOS PCT %  --  6   EOS PCT %  --  0     Results from last 7 days   Lab Units 21  0455   SODIUM mmol/L 142   POTASSIUM mmol/L 3 2*   CHLORIDE mmol/L 104   CO2 mmol/L 28   BUN mg/dL 18   CREATININE mg/dL 0 89   ANION GAP mmol/L 10   CALCIUM mg/dL 8 6   ALBUMIN g/dL 2 6*   TOTAL BILIRUBIN mg/dL 0 66   ALK PHOS U/L 60   ALT U/L 18   AST U/L 25   GLUCOSE RANDOM mg/dL 111     Results from last 7 days   Lab Units 06/19/21  0455   INR  3 05*                       * I Have Reviewed All Lab Data Listed Above  * Additional Pertinent Lab Tests Reviewed: Stephanie 66 Admission Reviewed    Imaging:    Imaging Reports Reviewed Today Include: all imaging: Xr chest, xr wrist, xr hand, ct spine, ct head    Recent Cultures (last 7 days):           Last 24 Hours Medication List:   Current Facility-Administered Medications   Medication Dose Route Frequency Provider Last Rate    acetaminophen  650 mg Oral Q6H PRN Borrero Peng, DO      amLODIPine  2 5 mg Oral Daily Rito Allen, DO      atenolol  50 mg Oral Daily Rito Allen, DO      cefTRIAXone  1,000 mg Intravenous Q24H Rito Allen, DO      cholecalciferol  1,000 Units Oral Daily Borrero Peng, DO      Diclofenac Sodium  2 g Topical 4x Daily Rito Allen, DO      furosemide  20 mg Oral Daily Borrero Peng, DO      glycerin-hypromellose-  1 drop Both Eyes TID Rito Allen, DO      HYDROmorphone  2 mg Oral 4x Daily Rito Allen, DO      ondansetron  4 mg Intravenous Q4H PRN Borrero Peng, DO      [START ON 6/20/2021] warfarin  2 5 mg Oral Once per day on Sun Tue Thu Rito Allen, DO      And    warfarin  1 25 mg Oral Once per day on Mon Wed Fri Sat Borrero Peng, DO          Today, Patient Was Seen By: Steve Vizcarra MD    ** Please Note: Dictation voice to text software may have been used in the creation of this document   **

## 2021-06-19 NOTE — ASSESSMENT & PLAN NOTE
80year old female suffered from a fall which resulted in thumb fracture of her left arm    - PT/OT eval pending

## 2021-06-19 NOTE — PHYSICAL THERAPY NOTE
Nephrology  Patient: Aaliyah Alan Date:2019   : 1943 Attending: Christelle Vela MD   76 year old female        Chief complaint: Pt presented with SOB; consulted for CKD and fluid overload    HPI:from initial consult  This is a 76 year old female with a past medical history significant for diabetes mellitus type 2, chronic hypoxic respiratory failure on 3 liter oxygen, COPD, obstructive sleep apnea on CPAP, morbid obesity, severe pulmonary hypertension, CKD stage IV, status post aortic valve replacement in      Pt presented with shortness of breath and cough. Per patient was exposed to multiple sick contacts at the rehab facility. Pt feels she is retaining fluid. deneis fevers, CP etc. In ED- Influenza screen is negative. Pt is admitted for further management.,     Nephrology consult is requested by Dr Almeida for management of CKD,  and fluid electrolyte issues. Baseline creat is at 1.7-2.0. Creat ON ADMIT was at  2.0. Recent SARANYA-during last hospitalization, needed HD transiently. Was aslo fluid overloaded during that hospitalization-diuresed well. Discharge wt was at 126 kg. Wt on admit is at 139 kg. Pt denies urinary c/o. deneis non compliance with medications at home.   .   Recent events/Subjective:  - no issues overnight. Diuresing better. Less sob and less cough. No cp    Past Medical History:   Diagnosis Date   • Adrenal adenoma     Benign 2.4 cm    • Anemia     Followed by Dr. Jasvir Gaines [phone] [facility]   • Aortic stenosis, moderate 3/15/2009    Echo   • Arterial ischemic stroke, vertebrobasilar, brainstem, remote, resolved    • Arthritis    • Benign ovarian cyst 3/26/2003    Benign serous cystadenoma of partial ovary   • Bronchitis     Followed by Dr. Jasvir Gaines [phone] [facility]   • CAD (coronary artery disease)    • Cerebral infarction (CMS/HCC)    • Chronic kidney disease, stage III (moderate) (CMS/HCC)    •  PHYSICAL THERAPY EVALUATION          Patient Name: Geno Orellana  ZLWNC'N Date: 6/19/2021   PT EVALUATION    80 y o     [phone]    Syncope [R55]  Weakness [R53 1]  Acute UTI [N39 0]  Closed fracture of base of proximal phalanx of left thumb [S62 512A]    Past Medical History:   Diagnosis Date    Chronic pain     Hyperlipidemia     Hypertension     L1 vertebral fracture (HCC)     Osteoporosis     Paroxysmal A-fib (HCC)     TIA (transient ischemic attack)      Past Surgical History:   Procedure Laterality Date    BACK SURGERY          06/19/21 1047   PT Last Visit   PT Visit Date 06/19/21   Note Type   Note type Evaluation   Pain Assessment   Pain Assessment Tool 0-10   Pain Score 4   Pain Location/Orientation Orientation: Left; Other (Comment)  (hand- thumb)   Hospital Pain Intervention(s) Repositioned; Ambulation/increased activity; Emotional support; Rest   Home Living   Type of Home Other (Comment)  ([facility] Mckay)   Home Layout One level   Bathroom Shower/Tub Walk-in shower   Bathroom Toilet Raised   Bathroom Equipment Grab bars in shower;Built-in [address]; Other (Comment)  (Rollator)   Prior Function   Level of [place] Independent with ADLs and functional mobility; Needs assistance with IADLs   Lives With Alone   Receives Help From Other (Comment)  (facility)   ADL Assistance Independent   IADLs Needs assistance  (does her own laundry  ILF provides transport)   Falls in the last 6 months 1 to 4  (reports 1 related to admission)   Comments pt reports being indep pta w use of cane but recently using Rollator more  -   Restrictions/Precautions   Weight Bearing Precautions Per Order No   Braces or Orthoses Splint  (thumb spica)   Other Precautions Cognitive; Chair Alarm; Bed Alarm; Fall Risk;Pain   General   Additional Pertinent History pt admitted 6/18/21 for thumb fx, splinted in ED   up Chronic pain     Followed by Dr. Jasvir Gaines [phone] [facility]   • Colon polyps 2006, 2-12    polyps   • CVA (cerebral infarction)     \"Small\"      no residual   • DJD (degenerative joint disease)    • DM (diabetes mellitus) (CMS/[facility])    • Fall 2016   • Hemorrhoids, internal    • History of renal calculi    • HTN (hypertension)    • Hypercholesteremia    • Hypoxia 2012    Followed by Dr. Jasvir Gaines [phone] [facility]   • Left heart failure with left ejection fraction greater than or equal to 50 percent (CMS/[facility]) 3/17/09    EF=57%, per myocardial perfusion scan   • Microscopic hematuria    • OA (osteoarthritis) of knee    • Obstructive sleep apnea on CPAP bring card    Settings:CPAPat 8-18 cm H2O DME: [facility]   • On supplemental oxygen therapy     SettinL O2 via NC w/ exertion DME: [facility]   • JIM (obstructive sleep apnea)     Followed by Dr. Jasvir Gaines [phone] [facility]   • Osteopenia    • Overweight 2015    Followed by Dr. Jasvir Gaines [phone] [facility]   • Pneumonia     Followed by Dr. Jasvir Gaines [phone] [facility]   • Pulmonary nodules 5-6 mm RML & 4 mm LLL nodule, stable over 2 yrs.    Followed by Dr. Jasvir Gaines [phone] [facility]   • S/P AVR-21mm tissue 10/6/2014   • TIA (transient ischemic attack)    • Urinary tract infection    • Vitamin D deficiency    • Wears dentures     Full Upper, lower Partial   • Wears glasses        Past Surgical History:   Procedure Laterality Date   • Abdominal adhesion surgery  2012   • Aortic valve replacement  10/6/14    21mm tissue   • Bilateral salpingoophorectomy  2012    Dr. Haim Ramos, bilateral salpingo-oophorectomy and removal of a 10 cm pelvic mass.   • Biopsy of external ear  2010    Dr. Jame Rodriguez, BX of  and oob orders   Cognition   Overall Cognitive Status WFL   Arousal/Participation Cooperative   Orientation Level Oriented to person;Oriented to place;Oriented to time   Memory Within functional limits   Following Commands Follows one step commands with increased time or repetition   Comments tangential   RLE Assessment   RLE Assessment X   Strength RLE   R Knee Extension 3-/5  (pain limited)   RLE Overall Strength 3-/5   LLE Assessment   LLE Assessment X  (3- to 3/5)   Bed Mobility   Supine to Sit 5  Supervision   Additional items Bedrails; Increased time required;Verbal cues   Sit to Supine 4  Minimal assistance   Additional items Increased time required;Verbal cues; Assist x 1;LE management   Transfers   Sit to Stand 5  Supervision   Additional items Bedrails; Increased time required;Verbal cues; Other  (RW)   Stand to Sit 5  Supervision   Additional items Bedrails; Increased time required;Verbal cues; Other  (RW)   Toilet transfer 4  Minimal assistance   Additional items Assist x 1; Increased time required;Verbal cues;Standard toilet; Other  (RW)   Ambulation/Elevation   Gait pattern Poor UE support; Excessively slow; Short stride; Improper Weight shift;Decreased foot clearance   Gait Assistance 4  Minimal assist   Additional items Assist x 1;Verbal cues  (to encourage normal gait speed)   Assistive Device Rolling walker   Distance 15'x2   Balance   Static Standing Fair  (w RW)   Dynamic Standing Fair -  (w UE support)   Ambulatory Poor +  (w RW)   Activity Tolerance   Activity Tolerance Treatment limited secondary to medical complications (Comment)  (weakness)   Medical Staff Made Aware Zaida Alcala CM   Nurse Made Aware Samantha RN   Assessment   Prognosis Good   Problem List Decreased strength; Impaired balance;Decreased mobility; Decreased safety awareness;Orthopedic restrictions;Pain   Assessment Sidra Cid is a 80 y o  female admitted to [facility] on 6/18/2021 for Thumb fracture  Thumb spica applied in ED   PT was Left earlobe = benign   • Cardiac surgery     • Cataract extraction w/  intraocular lens implant      Left   • Colonoscopy  ,      Dr. Jeronimo Rodriguez   • Colonoscopy w/ polypectomy  2000    Dr. Tyrone Garces   • Colonoscopy w/ polypectomy  10/02/2003    Dr. Jeronimo Rodriguez   • Coronary angiogram - cv  10/01/2014   • Eye surgery  1998    left cataract   • Joint replacement      Left knee   • Meniscectomy  10/11/2000    Dr. Jaiden Pierson:  Right knee arthroscopy, partial lateral meniscectomy.Debridement and chondroplasty, medial femoral condyle.   • Partial removal of ovary(s)  2003    Dr. Jonnie Guzman:  Laparscopy with partial oopherectomy   • Pilonidal cyst drainage     • Service to gastroenterology     • Skin biopsy  2007    Benign: Skin tags   • Skin biopsy  2007    Benign: skin tags   • Total abdominal hysterectomy     • Total knee arthroplasty  2007    Dr. Tj Richardson, Cemented right total knee arthroplasty.       Social History     Socioeconomic History   • Marital status: Single     Spouse name: Not on file   • Number of children: 0   • Years of education: Not on file   • Highest education level: Not on file   Occupational History   • Occupation: retired   Social Needs   • Financial resource strain: Not on file   • Food insecurity:     Worry: Not on file     Inability: Not on file   • Transportation needs:     Medical: Not on file     Non-medical: Not on file   Tobacco Use   • Smoking status: Former Smoker     Packs/day: 0.50     Years: 40.00     Pack years: 20.00     Last attempt to quit: 3/13/2008     Years since quittin.0   • Smokeless tobacco: Never Used   Substance and Sexual Activity   • Alcohol use: No     Alcohol/week: 0.0 oz   • Drug use: No   • Sexual activity: Never   Lifestyle   • Physical activity:     Days per week: Not on file     Minutes per session: Not on file   • Stress: Not on file   Relationships   • Social connections:     Talks on  consulted and pt was seen on 6/19/2021 for mobility assessment and d/c planning  Pt presents w fall risk, pain  Somewhat of an inconsistent historian throughout session  Reports recently using Rollator for mobility and indep for ADLs  Pt is currently functioning at a supervision to min A for bed mobility, supervision for transfers and min A for ambulation w RW  Pt demonstrated significantly slow gait speeds contributing to higher fall risk  Require constant cuing for safe use of AD, as well as transfer technique  Additional cues to redirect attention and for safety awareness  Pt will benefit from continued skilled IP PT to address the above mentioned impairments  in order to maximize recovery and increase functional independence when completing mobility and ADLs  At this time PT recommendations for d/c are STR given social barriers, risk for falls and decline in function preventing safe dc home alone  End of session pt repositioned in bed, alarm engaged and all needs in reach  Encourage OOB for meals and ambulation to bathroom as tolerated  Barriers to Discharge Decreased caregiver support   Barriers to Discharge Comments lives alone, ?support from ILF   Goals   Patient Goals to walk more   STG Expiration Date 07/03/21   Short Term Goal #1 1)  Pt will perform bed mobility with Trisha demonstrating appropriate technique 100% of the time in order to improve function  2)  Perform all transfers with Trisha demonstrating safe and appropriate technique 100% of the time in order to improve ability to negotiate safely in home environment  3) Amb with least restrictive AD > 50'x1 with mod I in order to demonstrate ability to negotiate in home environment  4)  Improve overall strength and balance 1/2 grade in order to optimize ability to perform functional tasks and reduce fall risk  5) Increase activity tolerance to 45 minutes in order to improve endurance to functional tasks  6)  PT for ongoing patient and family/caregiver education, DME needs and d/c planning in order to promote highest level of function in least restrictive environment  PT Treatment Day 0   Plan   Treatment/Interventions Functional transfer training;LE strengthening/ROM; Therapeutic exercise;Cognitive reorientation;Patient/family training;Equipment eval/education;Gait training;Bed mobility; Compensatory technique education;Continued evaluation;Spoke to nursing;OT;Spoke to case management   PT Frequency Other (Comment)  (4-5x)   Recommendation   PT Discharge Recommendation Post acute rehabilitation services   PT - OK to Discharge Yes   Additional Comments The patient's AM-PAC Basic Mobility Inpatient Short Form Raw Score is 15, Standardized Score is 36 97  A standardized score less than 42 9 suggests the patient may benefit from discharge to post-acute rehabilitation services  Please also refer to the recommendation of the Physical Therapist for safe discharge planning     AM-PAC Basic Mobility Inpatient   Turning in Bed Without Bedrails 3   Lying on Back to Sitting on Edge of Flat Bed 2   Moving Bed to Chair 3   Standing Up From Chair 3   Walk in Room 3   Climb 3-5 Stairs 1   Basic Mobility Inpatient Raw Score 15   Basic Mobility Standardized Score 36 97   History: co - morbidities, age, social background, fall risk, use of assistive device, assist for iadl's, cognition, thumb spica  Exam: impairments in systems including musculoskeletal (strength), neuromuscular (balance, gait, transfers, motor function), joint integrity,cognition  Clinical: unstable/unpredictable  Complexity:high      Malaika Mast, PT phone: Not on file     Gets together: Not on file     Attends Rastafarian service: Not on file     Active member of club or organization: Not on file     Attends meetings of clubs or organizations: Not on file     Relationship status: Not on file   • Intimate partner violence:     Fear of current or ex partner: Not on file     Emotionally abused: Not on file     Physically abused: Not on file     Forced sexual activity: Not on file   Other Topics Concern   •  Service Not Asked   • Blood Transfusions Not Asked   • Caffeine Concern Not Asked   • Occupational Exposure Not Asked   • Hobby Hazards Not Asked   • Sleep Concern Not Asked   • Stress Concern Not Asked   • Weight Concern Not Asked   • Special Diet Not Asked   • Back Care Not Asked   • Exercise Not Asked   • Bike Helmet Not Asked   • Seat Belt Yes   • Self-Exams Yes   Social History Narrative   • Not on file       Family History   Problem Relation Age of Onset   • Hypertension Mother    • High cholesterol Mother    • Heart disease Mother    • Stroke Mother    • Myocardial Infarction Mother    • Arthritis Mother    • High blood pressure Mother    • Heart disease Father    • Arthritis Father    • Stroke Father    • High cholesterol Sister    • Cancer Maternal Aunt 35        Breast       ALLERGIES:   Allergen Reactions   • Morphine NAUSEA       Medications/Infusions:  Medications Prior to Admission   Medication Sig Dispense Refill   • labetalol (NORMODYNE) 200 MG tablet Take 200 mg by mouth 3 times daily.     • Cranberry 500 MG Cap Take 500 mg by mouth daily.     • warfarin (COUMADIN) 4 MG tablet Take 4 mg by mouth daily. Take 1 tab (=4mg) Thursday, Friday, Saturday and Sunday 60 tablet 0   • hydrALAZINE (APRESOLINE) 50 MG tablet Take 2 tablets by mouth 3 times daily. 270 tablet 1   • insulin lispro protamine-insulin lispro (HUMALOG MIX 75/25 KWIKPEN) (75-25) 100 UNIT/ML pen-injector Inject 35 Units into the skin 2 times daily (before meals). (Patient taking  differently: Inject 42-45 Units into the skin 2 times daily (before meals). AM base unit is 42 units   PM base unit is 45 units   Patient uses home sliding scale) 60 mL 5   • Cholecalciferol (VITAMIN D3) 1000 units capsule Take 1,000 Units by mouth daily.     • coenzyme Q10 100 MG capsule Take 100 mg by mouth daily.     • acetaminophen (TYLENOL) 500 MG tablet Take 500 mg by mouth every 6 hours as needed for Pain.     • furosemide (LASIX) 80 MG tablet TAKE 1 TABLET BY MOUTH TWICE DAILY 180 tablet 3   • tiotropium (SPIRIVA HANDIHALER) 18 MCG capsule for inhaler Place 1 capsule into inhaler and inhale daily. 90 capsule 3   • albuterol 108 (90 Base) MCG/ACT inhaler Inhale 2 puffs into the lungs every 4 hours as needed for Shortness of Breath or Wheezing. 1 Inhaler 5   • tiZANidine (ZANAFLEX) 2 MG tablet Muscle relaxant for right hip pain (Patient taking differently: Take 2 mg by mouth every 8 hours as needed. Muscle relaxant for right hip pain) 30 tablet 2   • traMADol (ULTRAM) 50 MG tablet Take 1 tablet by mouth every 8 hours as needed for Pain. 30 tablet 0   • lidocaine (LIDODERM) 5 % patch Place 1 patch onto the skin daily. Remove patch 12 hours after applying (Patient taking differently: Place 1 patch onto the skin as needed (for back pain). Remove patch 12 hours after applying) 30 patch 0   • polyethylene glycol (MIRALAX) powder Take 17 g by mouth 2 times daily. Mix with 8oz of liquid      • nitroGLYcerin (NITROSTAT) 0.4 MG SL tablet Place 1 tablet under the tongue every 5 minutes as needed for Chest pain. 30 tablet 0   • Multiple Vitamins-Minerals (CENTRUM SILVER ADULT 50+ PO) Take 1 tablet by mouth daily.     • clindamycin (CLEOCIN) 150 MG capsule Take 150 mg by mouth as directed. take 4 capsules 1 hour prior to dental appointment     • B-D U/F PEN NEEDLE 5/16\" 31G X 8 MM Misc USE TWICE DAILY 200 each 3   • blood glucose test strip TEST BLOOD SUGAR THREE TIMES DAILY AS DIRECTED. Diagnostic Code 250.00 100 strip  10     • benzonatate  100 mg Oral TID   • cholecalciferol  1,000 Units Oral Daily   • hydrALAZINE  100 mg Oral TID   • insulin aspart  42 Units Subcutaneous BID AC   • labetalol  200 mg Oral 3 times per day   • lidocaine  1 patch Transdermal Daily   • polyethylene glycol  17 g Oral Daily   • tiotropium  18 mcg Inhalation Daily   • albuterol-ipratropium 2.5 mg/0.5 mg  3 mL Nebulization 4x Daily Resp   • WARFARIN - PHARMACIST MONITORED   Does not apply See Admin Instructions   • insulin lispro   Subcutaneous TID AC   • doxycycline hyclate  100 mg Oral 2 times per day   • sodium chloride (PF)  2 mL Injection 2 times per day   • lidocaine  1 patch Transdermal Once     • furosemide (LASIX) 250 mg in sodium chloride 0.9 % 125 mL total volume infusion 10 mg/hr (04/11/19 0531)   • dextrose 5 % infusion         Review of Systems:  Positives stated above in HPI.  Full ROS completed and is otherwise negative.     Vital Last Value 24 Hour Range   Temperature 96.7 °F (35.9 °C) Temp  Min: 96.7 °F (35.9 °C)  Max: 99 °F (37.2 °C)   Pulse 100 Pulse  Min: 100  Max: 106   Respiratory 20 Resp  Min: 18  Max: 20   Blood Pressure 130/70 BP  Min: 110/56  Max: 130/70   Art BP   No data recorded   Pulse Oximetry 98 % SpO2  Min: 97 %  Max: 99 %     Vital Today Admitted   Weight (refuse) Weight: 135.5 kg   Height N/A Height: 5' 6\" (167.6 cm)   BMI N/A BMI (Calculated): 48.21     Weight over the past 48 Hours:  Patient Vitals for the past 48 hrs:   Weight   04/09/19 1325 (!) 139 kg        Hemodynamics:      Last Value 24 Hour Range   CVP   No data recorded   PAS/PAD   No data recorded   PCWP   No data recorded   CO   No data recorded   CI   No data recorded   SVR   No data recorded   SV02   No data recorded     Intake/Output:    Last Stool Occurrence: 0 (04/09/19 2146)    No intake/output data recorded.    I/O last 3 completed shifts:  In: 300 [P.O.:300]  Out: 2800 [Urine:2800]      Intake/Output Summary (Last 24 hours) at 4/11/2019 7343  Last  data filed at 4/11/2019 0529  Gross per 24 hour   Intake 300 ml   Output 2800 ml   Net -2500 ml       Physical Exam:    General: Alert, no acute distress  HEENT: Head atraumatic, normocephalic.  Moist oral mucus membranes.  external ears normal, nose normal. Sclera non-icteric.   Neck: Supple, difficult to appreciate  JVD.  Trachea midline. No thyromegaly  Chest/Lungs: Normal effort.  Symmetrical expansion.  Diminished air entry ay bases  No wheezes, rales or rhonchi.  CVS:  S1,S2 well heard.  no pericardial rub heard.  Abdomen:obese,  . Soft, non tender, non distended.  No hepatosplenomegaly.  Neuro: No focal neurological deficit.  A&O x 3.   Skin: Warm, dry.  No rashes.   Extremities:  No clubbing or cyanosis. 2+LE edema.      Laboratory Results:  Recent Labs   Lab 04/11/19  0405 04/10/19  0351 04/09/19  0544 04/08/19  1322   SODIUM 140 139 134* 134*   POTASSIUM 4.2 4.2 4.8 4.2   CHLORIDE 100 100 97* 98   CO2 33* 32 29 29   ANIONGAP 11 11 13 11   BUN 72* 72* 72* 74*   CREATININE 1.52* 1.57* 1.81* 2.07*   GFRNA 33 32 27 23   GFRA 38 37 31 26   GLUCOSE 54* 133* 215* 94   CALCIUM 9.4 9.1 9.0 8.9   ALBUMIN  --   --  2.7* 2.9*   MG  --   --  2.7* 2.5*   AST  --   --  34 39*   GPT  --   --  40 40   ALKPT  --   --  81 79   BILIRUBIN  --   --  0.3 0.4       Recent Labs   Lab 04/11/19  0405 04/10/19  0351 04/09/19  0544   WBC 8.1 9.1 8.8   HGB 7.7* 7.8* 7.7*   HCT 25.5* 26.3* 25.3*    225 217       No results found    No results found    Urine Panel  No results found    Imaging/Procedures:  CXR 4/8/19  IMPRESSION:   1.  Improved aeration of the lung bases with mild residual patchy alveolar  opacities from 3/1/2019.  2.  Unchanged mild enlargement of the cardiac silhouette    Impression, Plan & Recommendations:    -CKD stage4  :  · Baseline creatinine is around 1.7 - 2.0  · Last creat is below baseline, but expect to rise with diuresis  · Will accept higher creats to maintain volume status  · Vein mapping done - 3/10  and subsequent vascular surgery (Dr. Villareal) for follow up for access eval. Unfortunately pt was deemed high cardiac risk for AVF/avg placement at that time  · Save Right arm for possible future AVG/AVF    -Hyponatremia:  · Likely sec to hypervolemia  · Improved    -Fluid overload:  · Diuresing better.   · but still fluid overloaded. Will add metolzoone  · Cont  in FR  · Monitor daily wt's and I/O's  · If response is suboptimal to diuretics, will need to consider starting RTT. Pt is aware    Severe pulmonary hypertension with possible right ventricular failure:   · followed by cardiology/heart failure service    -HTN:   · Bp's acceptable  · Cont present medications    -Anemia: of CKD.  · Recheck  iron studies pending    -Bone-mineral issues:  · Phos 4.5, iPTH - 204 (since Ca is in mid 9.0's will wait to add calcitriol) and Vitamin D - 37.5     D/w pt and Dr. Mirian Zapata MD  Office [phone]

## 2021-06-19 NOTE — ASSESSMENT & PLAN NOTE
Rate controlled  - Continue once daily atenolol 50mg daily  - Anticoagulated with warfarin    Recent Labs     06/18/21  0936 06/19/21  0455   INR 2 37* 3 05*

## 2021-06-19 NOTE — PLAN OF CARE
Problem: Potential for Falls  Goal: Patient will remain free of falls  Description: INTERVENTIONS:  - Educate patient/family on patient safety including physical limitations  - Instruct patient to call for assistance with activity   - Consult OT/PT to assist with strengthening/mobility   - Keep Call bell within reach  - Keep bed low and locked with side rails adjusted as appropriate  - Keep care items and personal belongings within reach  - Initiate and maintain comfort rounds  - Make Fall Risk Sign visible to staff  - Offer Toileting every Hours, in advance of need  - Initiate/Maintainalarm  - Obtain necessary fall risk management equipment:  - Apply yellow socks and bracelet for high fall risk patients  - Consider moving patient to room near nurses station  Outcome: Progressing     Problem: MOBILITY - ADULT  Goal: Maintain or return to baseline ADL function  Description: INTERVENTIONS:  -  Assess patient's ability to carry out ADLs; assess patient's baseline for ADL function and identify physical deficits which impact ability to perform ADLs (bathing, care of mouth/teeth, toileting, grooming, dressing, etc )  - Assess/evaluate cause of self-care deficits   - Assess range of motion  - Assess patient's mobility; develop plan if impaired  - Assess patient's need for assistive devices and provide as appropriate  - Encourage maximum independence but intervene and supervise when necessary  - Involve family in performance of ADLs  - Assess for home care needs following discharge   - Consider OT consult to assist with ADL evaluation and planning for discharge  - Provide patient education as appropriate  Outcome: Progressing  Goal: Maintains/Returns to pre admission functional level  Description: INTERVENTIONS:  - Perform BMAT or MOVE assessment daily        Problem: MOBILITY - ADULT  Goal: Maintain or return to baseline ADL function  Description: INTERVENTIONS:  -  Assess patient's ability to carry out ADLs; assess patient's baseline for ADL function and identify physical deficits which impact ability to perform ADLs (bathing, care of mouth/teeth, toileting, grooming, dressing, etc )  - Assess/evaluate cause of self-care deficits   - Assess range of motion  - Assess patient's mobility; develop plan if impaired  - Assess patient's need for assistive devices and provide as appropriate  - Encourage maximum independence but intervene and supervise when necessary  - Involve family in performance of ADLs  - Assess for home care needs following discharge   - Consider OT consult to assist with ADL evaluation and planning for discharge  - Provide patient education as appropriate  Outcome: Progressing  Goal: Maintains/Returns to pre admission functional level  Description: INTERVENTIONS:  - Perform BMAT or MOVE assessment daily    - Set and communicate daily mobility goal to care team and patient/family/caregiver       Problem: PAIN - ADULT  Goal: Verbalizes/displays adequate comfort level or baseline comfort level  Description: Interventions:  - Encourage patient to monitor pain and request assistance  - Assess pain using appropriate pain scale  - Administer analgesics based on type and severity of pain and evaluate response  - Implement non-pharmacological measures as appropriate and evaluate response  - Consider cultural and social influences on pain and pain management  - Notify physician/advanced practitioner if interventions unsuccessful or patient reports new pain  Outcome: Progressing     Problem: INFECTION - ADULT  Goal: Absence or prevention of progression during hospitalization  Description: INTERVENTIONS:  - Assess and monitor for signs and symptoms of infection  - Monitor lab/diagnostic results  - Monitor all insertion sites, i e  indwelling lines, tubes, and drains  - Monitor endotracheal if appropriate and nasal secretions for changes in amount and color  - [place] appropriate cooling/warming therapies per order  - Administer medications as ordered  - Instruct and encourage patient and family to use good hand hygiene technique  - Identify and instruct in appropriate isolation precautions for identified infection/condition  Outcome: Progressing  Goal: Absence of fever/infection during neutropenic period  Description: INTERVENTIONS:  - Monitor WBC    Outcome: Progressing     Problem: SAFETY ADULT  Goal: Patient will remain free of falls  Description: INTERVENTIONS:  - Educate patient/family on patient safety including physical limitations  - Instruct patient to call for assistance with activity   Goal: Maintain or return to baseline ADL function  Description: INTERVENTIONS:  -  Assess patient's ability to carry out ADLs; assess patient's baseline for ADL function and identify physical deficits which impact ability to perform ADLs (bathing, care of mouth/teeth, toileting, grooming, dressing, etc )  - Assess/evaluate cause of self-care deficits   - Assess range of motion  - Assess patient's mobility; develop plan if impaired  - Assess patient's need for assistive devices and provide as appropriate  - Encourage maximum independence but intervene and supervise when necessary  - Involve family in performance of ADLs  - Assess for home care needs following discharge   - Consider OT consult to assist with ADL evaluation and planning for discharge    Problem: DISCHARGE PLANNING  Goal: Discharge to home or other facility with appropriate resources  Description: INTERVENTIONS:  - Identify barriers to discharge w/patient and caregiver  - Arrange for needed discharge resources and transportation as appropriate  - Identify discharge learning needs (meds, wound care, etc )  - Arrange for interpretive services to assist at discharge as needed  - Refer to Case Management Department for coordinating discharge planning if the patient needs post-hospital services based on physician/advanced practitioner order or complex needs related to functional status, cognitive ability, or social support system  Outcome: Progressing     Problem: Knowledge Deficit  Goal: Patient/family/caregiver demonstrates understanding of disease process, treatment plan, medications, and discharge instructions  Description: Complete learning assessment and assess knowledge base    Interventions:  - Provide teaching at level of understanding  - Provide teaching via preferred learning methods  Outcome: Progressing     Problem: Prexisting or High Potential for Compromised Skin Integrity  Goal: Skin integrity is maintained or improved  Description: INTERVENTIONS:  - Identify patients at risk for skin breakdown  - Assess and monitor skin integrity  - Assess and monitor nutrition and hydration status  - Monitor labs   - Assess for incontinence   - Turn and reposition patient  - Assist with mobility/ambulation  - Relieve pressure over bony prominences  - Avoid friction and shearing  - Provide appropriate hygiene as needed including keeping skin clean and dry  - Evaluate need for skin moisturizer/barrier cream  - Collaborate with interdisciplinary team   - Patient/family teaching  - Consider wound care consult   Outcome: Progressing

## 2021-06-20 LAB
INR PPP: 2.62 (ref 0.84–1.19)
PROTHROMBIN TIME: 27.4 SECONDS (ref 11.6–14.5)

## 2021-06-20 PROCEDURE — 85610 PROTHROMBIN TIME: CPT | Performed by: STUDENT IN AN ORGANIZED HEALTH CARE EDUCATION/TRAINING PROGRAM

## 2021-06-20 PROCEDURE — 97116 GAIT TRAINING THERAPY: CPT

## 2021-06-20 PROCEDURE — 99232 SBSQ HOSP IP/OBS MODERATE 35: CPT | Performed by: STUDENT IN AN ORGANIZED HEALTH CARE EDUCATION/TRAINING PROGRAM

## 2021-06-20 PROCEDURE — 99222 1ST HOSP IP/OBS MODERATE 55: CPT | Performed by: PHYSICIAN ASSISTANT

## 2021-06-20 PROCEDURE — 97530 THERAPEUTIC ACTIVITIES: CPT

## 2021-06-20 PROCEDURE — 97110 THERAPEUTIC EXERCISES: CPT

## 2021-06-20 RX ADMIN — HYDROMORPHONE HYDROCHLORIDE 2 MG: 2 TABLET ORAL at 08:28

## 2021-06-20 RX ADMIN — AMLODIPINE BESYLATE 2.5 MG: 2.5 TABLET ORAL at 21:55

## 2021-06-20 RX ADMIN — DICLOFENAC SODIUM 2 G: 10 GEL TOPICAL at 18:09

## 2021-06-20 RX ADMIN — WARFARIN SODIUM 2.5 MG: 2.5 TABLET ORAL at 18:06

## 2021-06-20 RX ADMIN — HYDROMORPHONE HYDROCHLORIDE 2 MG: 2 TABLET ORAL at 11:58

## 2021-06-20 RX ADMIN — CEFTRIAXONE SODIUM 1000 MG: 10 INJECTION, POWDER, FOR SOLUTION INTRAVENOUS at 16:04

## 2021-06-20 RX ADMIN — Medication 1000 UNITS: at 08:27

## 2021-06-20 RX ADMIN — GLYCERIN, HYPROMELLOSE, POLYETHYLENE GLYCOL 1 DROP: .2; .2; 1 LIQUID OPHTHALMIC at 16:04

## 2021-06-20 RX ADMIN — DICLOFENAC SODIUM 2 G: 10 GEL TOPICAL at 21:55

## 2021-06-20 RX ADMIN — ATENOLOL 50 MG: 50 TABLET ORAL at 08:28

## 2021-06-20 RX ADMIN — HYDROMORPHONE HYDROCHLORIDE 2 MG: 2 TABLET ORAL at 21:55

## 2021-06-20 RX ADMIN — DICLOFENAC SODIUM 2 G: 10 GEL TOPICAL at 08:28

## 2021-06-20 RX ADMIN — DICLOFENAC SODIUM 2 G: 10 GEL TOPICAL at 11:58

## 2021-06-20 RX ADMIN — HYDROMORPHONE HYDROCHLORIDE 2 MG: 2 TABLET ORAL at 18:06

## 2021-06-20 RX ADMIN — GLYCERIN, HYPROMELLOSE, POLYETHYLENE GLYCOL 1 DROP: .2; .2; 1 LIQUID OPHTHALMIC at 21:55

## 2021-06-20 NOTE — PLAN OF CARE
Problem: PHYSICAL THERAPY ADULT  Goal: Performs mobility at highest level of function for planned discharge setting  See evaluation for individualized goals  Description:   Outcome: Progressing  Note: Prognosis: Good  Problem List: Decreased strength, Decreased range of motion, Decreased endurance, Impaired balance, Decreased mobility, Decreased cognition, Impaired judgement, Decreased safety awareness, Decreased skin integrity, Orthopedic restrictions, Pain  Assessment: Pt  seated in bedside chair upon my arrival  Pt  reporting fatigue/pain, however agreeable to therapeutic intervention  Noted pt  new order for NWB status of LUE  Thus, application of platform for RW prior to therapeutic intervention  Performance of HEP with cues provided for proper completion  Progressed with transfers requiring A of therapist with cues for hand placement/technique  Pt  continued to require cueing to maintain NWB status of LUE both verbal/tactile cueing provided  Progressed with limited amb  trial due to fatigue  Pt  returned to seated in bedside chair  Pt  remained seated in bedside chair at end of treatment session  PT will continue to recommend d/c to rehab when medically stable for continued improvement of noted impairments  Barriers to Discharge: Decreased caregiver support, Inaccessible home environment  Barriers to Discharge Comments: Level of support at home  PT Discharge Recommendation: Post acute rehabilitation services     PT - OK to Discharge: Yes (if d/c to rehab when medically stable )    See flowsheet documentation for full assessment

## 2021-06-20 NOTE — PROGRESS NOTES
[facility]  Progress Note - Irais Thomas 7/20/1932, 80 y o  female MRN: [id number]  Unit/Bed#: E2 -01 Encounter: [id number]  Primary Care Provider: Merle Dubose MD   Date and time admitted to hospital: 6/18/2021  9:15 AM    * Thumb fracture  Assessment & Plan  80year old female suffered from a fall which resulted in thumb fracture of her left arm    - PT/OT recommending rehab  But patient declining rehab  Discussed with CM  CM will reach out to her facility [facility] determine if they have the resources to provide this need without her going to rehab  UTI (urinary tract infection)  Assessment & Plan  Continue empiric ceftriaxone  Await sensitivites  Results from last 7 days   Lab Units 06/18/21  1457   URINE CULTURE  70,000-79,000 cfu/ml Staphylococcus aureus*               Chronic pain  Assessment & Plan  Continue hydromorphone 2 mg q i d  Chronic diastolic (congestive) heart failure (HCC)  Assessment & Plan  Wt Readings from Last 3 Encounters:   06/18/21 58 kg (127 lb 13 9 oz)   05/14/21 56 2 kg (123 lb 12 8 oz)   04/28/21 56 7 kg (125 lb)     · Continue furosemide 20 mg daily    Benign essential hypertension  Assessment & Plan  Continue amlodipine and atenolol    Paroxysmal atrial fibrillation [facility])  Assessment & Plan  Rate controlled  - Continue once daily atenolol 50mg daily  - Anticoagulated with warfarin    Recent Labs     06/18/21  0936 06/19/21  0455 06/20/21  0831   INR 2 37* 3 05* 2 62*           VTE Pharmacologic Prophylaxis:   Pharmacologic: Warfarin (Coumadin)  Mechanical VTE Prophylaxis in Place: Yes    Patient Centered Rounds: I have performed bedside rounds with nursing staff today  Discussions with Specialists or Other Care Team Provider: Nursing    Education and Discussions with Family / Patient: patient    Time Spent for Care: 30 minutes  More than 50% of total time spent on counseling and coordination of care as described above      Current Length of Stay: 2 day(s)    Current Patient Status: Inpatient   Certification Statement: The patient will continue to require additional inpatient hospital stay due to cm discussion regarding placement    Discharge Plan: placement    Code Status: Level 3 - DNAR and DNI      Subjective:   Patient seen and examined at bedside  She does not want to go to rehab  I spoke to CM about this  CM will discuss with her facility if they are able to provide her with therapy  Unfortunately, this would not happen until ninfa   Objective:     Vitals:   Temp (24hrs), Av 6 °F (37 °C), Min:98 1 °F (36 7 °C), Max:99 1 °F (37 3 °C)    Temp:  [98 1 °F (36 7 °C)-99 1 °F (37 3 °C)] 98 6 °F (37 °C)  HR:  [87-92] 89  Resp:  [18] 18  BP: (115-151)/(61-74) 115/69  SpO2:  [92 %-95 %] 92 %  Body mass index is 24 16 kg/m²  Input and Output Summary (last 24 hours): Intake/Output Summary (Last 24 hours) at 2021 1201  Last data filed at 2021 0644  Gross per 24 hour   Intake 90 ml   Output 400 ml   Net -310 ml       Physical Exam:     Physical Exam  Vitals reviewed  Constitutional:       General: She is not in acute distress  HENT:      Head: Normocephalic  Nose: Nose normal       Mouth/Throat:      Mouth: Mucous membranes are moist    Eyes:      General: No scleral icterus  Cardiovascular:      Rate and Rhythm: Normal rate and regular rhythm  Pulmonary:      Effort: Pulmonary effort is normal  No respiratory distress  Breath sounds: No wheezing or rales  Abdominal:      Palpations: Abdomen is soft  Tenderness: There is no abdominal tenderness  Musculoskeletal:      Right lower leg: No edema  Left lower leg: No edema  Comments: Left thumb spica splint   Skin:     General: Skin is warm  Neurological:      Mental Status: She is alert  Mental status is at baseline     Psychiatric:         Mood and Affect: Mood normal          Behavior: Behavior normal        Additional Data:     Labs:    Results from last 7 days   Lab Units 06/19/21  0455 06/18/21  0936   WBC Thousand/uL 8 85 9 89   HEMOGLOBIN g/dL 11 1* 12 7   HEMATOCRIT % 33 2* 38 5   PLATELETS Thousands/uL 185 215   NEUTROS PCT %  --  86*   LYMPHS PCT %  --  7*   MONOS PCT %  --  6   EOS PCT %  --  0     Results from last 7 days   Lab Units 06/19/21  0455   SODIUM mmol/L 142   POTASSIUM mmol/L 3 2*   CHLORIDE mmol/L 104   CO2 mmol/L 28   BUN mg/dL 18   CREATININE mg/dL 0 89   ANION GAP mmol/L 10   CALCIUM mg/dL 8 6   ALBUMIN g/dL 2 6*   TOTAL BILIRUBIN mg/dL 0 66   ALK PHOS U/L 60   ALT U/L 18   AST U/L 25   GLUCOSE RANDOM mg/dL 111     Results from last 7 days   Lab Units 06/20/21  0831   INR  2 62*                       * I Have Reviewed All Lab Data Listed Above  * Additional Pertinent Lab Tests Reviewed:  Stephanie 66 Admission Reviewed    Imaging:    Imaging Reports Reviewed Today Include: no new imaging    Recent Cultures (last 7 days):     Results from last 7 days   Lab Units 06/18/21  1457   URINE CULTURE  70,000-79,000 cfu/ml Staphylococcus aureus*       Last 24 Hours Medication List:   Current Facility-Administered Medications   Medication Dose Route Frequency Provider Last Rate    acetaminophen  650 mg Oral Q6H PRN Alexys Morris, DO      amLODIPine  2 5 mg Oral Daily Victorine Clock, CRNP      atenolol  50 mg Oral Daily Alexys Morris, DO      cefTRIAXone  1,000 mg Intravenous Q24H Alexys Morris, DO Stopped (06/19/21 1806)    cholecalciferol  1,000 Units Oral Daily Rito Villa, DO      Diclofenac Sodium  2 g Topical 4x Daily Rito Villa, DO      furosemide  20 mg Oral Daily Maralyn Alexandra, DO      glycerin-hypromellose-  1 drop Both Eyes TID Rito Villa, DO      HYDROmorphone  2 mg Oral 4x Daily Rito Villa, DO      ondansetron  4 mg Intravenous Q4H PRN Jose Martinn Alexandra, DO      warfarin  2 5 mg Oral Once per day on Sun Tue Thu Rito Villa DO      And    warfarin  1 25 mg Oral Once per day on Mon Wed Roxana Frank DO          Today, Patient Was Seen By: Thaddeus Ngo MD    ** Please Note: Dictation voice to text software may have been used in the creation of this document   **

## 2021-06-20 NOTE — PHYSICAL THERAPY NOTE
Physical Therapy Progress Note     06/20/21 1410   PT Last Visit   PT Visit Date 06/20/21   Note Type   Note Type Treatment   Pain Assessment   Pain Assessment Tool 0-10   Pain Score 4   Pain Location/Orientation Orientation: Right;Location: Knee   Hospital Pain Intervention(s) Ambulation/increased activity;Repositioned   Restrictions/Precautions   Weight Bearing Precautions Per Order Yes   LUE Weight Bearing Per Order NWB   Braces or Orthoses Splint  (thumb spica)   Other Precautions Chair Alarm; Bed Alarm; Fall Risk;WBS;Pain;Cognitive   General   Chart Reviewed Yes   Response to Previous Treatment Patient reporting fatigue but able to participate  Family/Caregiver Present No   Subjective   Subjective Willing to participate in therapy this PM    Transfers   Sit to Stand 4  Minimal assistance   Additional items Assist x 1; Armrests; Increased time required;Verbal cues   Stand to Sit 4  Minimal assistance   Additional items Assist x 1; Armrests; Increased time required;Verbal cues   Additional Comments Cues to maintain NWB status of LUE   Ambulation/Elevation   Gait pattern Poor UE support; Short stride; Step to;Excessively slow; Inconsistent german; Shuffling;Decreased foot clearance   Gait Assistance 4  Minimal assist   Additional items Assist x 1;Verbal cues; Tactile cues   Assistive Device Rolling walker   Distance 15'   Balance   Static Sitting Fair +   Dynamic Sitting Fair   Static Standing Fair   Dynamic Standing Fair -   Ambulatory Poor +   Endurance Deficit   Endurance Deficit Yes   Endurance Deficit Description fatigue/weakness/pain   Activity Tolerance   Activity Tolerance Patient limited by fatigue;Patient limited by pain   Nurse Made Aware Yes   Exercises   THR Supine;Sitting;10 reps;AAROM; Bilateral   Assessment   Prognosis Good   Problem List Decreased strength;Decreased range of motion;Decreased endurance; Impaired balance;Decreased mobility; Decreased cognition; Impaired judgement;Decreased safety awareness;Decreased skin integrity;Orthopedic restrictions;Pain   Assessment Pt  seated in bedside chair upon my arrival  Pt  reporting fatigue/pain, however agreeable to therapeutic intervention  Noted pt  new order for NWB status of LUE  Thus, application of platform for RW prior to therapeutic intervention  Performance of HEP with cues provided for proper completion  Progressed with transfers requiring A of therapist with cues for hand placement/technique  Pt  continued to require cueing to maintain NWB status of LUE both verbal/tactile cueing provided  Progressed with limited amb  trial due to fatigue  Pt  returned to seated in bedside chair  Pt  remained seated in bedside chair at end of treatment session  PT will continue to recommend d/c to rehab when medically stable for continued improvement of noted impairments  Barriers to Discharge Decreased caregiver support; Inaccessible home environment   Barriers to Discharge Comments Level of support at home  Goals   Patient Goals To get better  STG Expiration Date 07/03/21   PT Treatment Day 1   Plan   Treatment/Interventions Functional transfer training;LE strengthening/ROM; Therapeutic exercise; Endurance training;Gait training;Spoke to nursing;Spoke to case management   Progress Slow progress, decreased activity tolerance   PT Frequency Other (Comment)  (4-5x/wk)   Recommendation   PT Discharge Recommendation Post acute rehabilitation services   Equipment Recommended [facility] Recommended Wheeled walker   Change/add to [address]? Yes, Add Accessories   Walker Accessories Platform attachment - left arm   PT - OK to Discharge Yes  (if d/c to rehab when medically stable  )   AM-PAC Basic Mobility Inpatient   Turning in Bed Without Bedrails 3   Lying on Back to Sitting on Edge of Flat Bed 2   Moving Bed to Chair 3   Standing Up From Chair 3   Walk in Room 3   Climb 3-5 Stairs 1   Basic Mobility Inpatient Raw Score 15   Basic Mobility Standardized Score 36 97     An AM-PAC Basic Mobility standardized score less than 42 9 suggests the patient may benefit from discharge to post-acute rehab services      Tessa Bahena PTA

## 2021-06-20 NOTE — CONSULTS
Consultation - Orthopedics   Peg Francisco 80 y o  female MRN: [id number]  Unit/Bed#: E2 -01 Encounter: [id number]      Assessment/Plan     Assessment:  Left nondisplaced proximal phalanx fracture of thumb    Plan:  Recommend conservative treatment with current thumb spica splint  Ice and elevation LUE  Pain control as needed  NWB to LUE  May use platform attachment if needs walker for assistance  Follow up in 1-2 weeks with Dr Jaren Garcia hand surgeon as outpatient  Please call if there are any questions or concerns  History of Present Illness   Physician Requesting Consult: Chayo Antonio MD  Reason for Consult / Principal Problem: left thumb injury  HPI: Peg Francisco is a 80y o  year old female who presents with left thumb pain after suffering a mechanical fall in her home 2 days ago on 6/18/21  Patient states she got up in the middle of the night to use the bathroom  She used her cane to walk to the bathroom and then when she tried to hang the cane up on the door knob she fell to the ground on her left side  She felt immediate pain in her left hand  She felt too weak to stand and she crawled back to her bedroom  She eventually called for help and her neighbors found her and called EMS  X-rays of left hand in ED revealed left proximal phalanx fracture of thumb  She was admitted to the medical service and orthopedics was consulted  Thumb spica splint was applied in ED  Patient lives by herself at [employer]  Inpatient consult to Orthopedic Surgery  Consult performed by: Paige Lynne PA-C  Consult ordered by: Chayo Antonio MD          Review of Systems   Constitutional: Negative  HENT: Negative  Eyes: Negative  Respiratory: Negative  Cardiovascular: Negative  Gastrointestinal: Negative  Endocrine: Negative  Genitourinary: Negative  Musculoskeletal: Positive for arthralgias and joint swelling  Skin: Negative  Allergic/Immunologic: Negative      Neurological: Positive for weakness  Hematological: Negative  Psychiatric/Behavioral: Negative  Historical Information   Past Medical History:   Diagnosis Date    Chronic pain     Hyperlipidemia     Hypertension     L1 vertebral fracture (HCC)     Osteoporosis     Paroxysmal A-fib (HCC)     TIA (transient ischemic attack)      Past Surgical History:   Procedure Laterality Date    BACK SURGERY       Social History   Social History     Substance and Sexual Activity   Alcohol Use Never     Social History     Substance and Sexual Activity   Drug Use Never     E-Cigarette/Vaping    E-Cigarette Use Never User      E-Cigarette/Vaping Substances    Nicotine No     THC No     Flavoring No     Other No     Unknown No      Social History     Tobacco Use   Smoking Status Former Smoker   Smokeless Tobacco Never Used     Family History: non-contributory    Meds/Allergies   all current active meds have been reviewed  Allergies   Allergen Reactions    Iodine - Food Allergy Shortness Of Breath    Acyclovir     Buprenorphine GI Intolerance and Other (See Comments)    Hydrocodone-Acetaminophen      Vomiting    Hydrocodone-Acetaminophen      Vomiting  Vomiting    Iodinated Diagnostic Agents     Latex     Metrizamide     Naproxen GI Intolerance    Shellfish Allergy - Food Allergy     Glucosamine Rash    Medical Tape Rash    Other Other (See Comments)     red raised areas - please use paper tape    Shellfish-Derived Products - Food Allergy Rash       Objective   Vitals: Blood pressure 115/69, pulse 89, temperature 98 6 °F (37 °C), temperature source Temporal, resp  rate 18, height 5' 1" (1 549 m), weight 58 kg (127 lb 13 9 oz), SpO2 92 %  ,Body mass index is 24 16 kg/m²  Intake/Output Summary (Last 24 hours) at 6/20/2021 0820  Last data filed at 6/20/2021 0644  Gross per 24 hour   Intake 90 ml   Output 400 ml   Net -310 ml     I/O last 24 hours:   In: 80 [I V :40; IV Piggyback:50]  Out: 400 [Urine:400]    Invasive Devices Peripheral Intravenous Line            Peripheral IV 06/18/21 Left Antecubital 1 day                Physical Exam  Constitutional:       General: She is not in acute distress  Appearance: She is well-developed  HENT:      Head: Normocephalic and atraumatic  Eyes:      Conjunctiva/sclera: Conjunctivae normal       Pupils: Pupils are equal, round, and reactive to light  Cardiovascular:      Rate and Rhythm: Normal rate and regular rhythm  Heart sounds: Normal heart sounds  No murmur heard  Pulmonary:      Effort: Pulmonary effort is normal  No respiratory distress  Breath sounds: Normal breath sounds  Abdominal:      General: Bowel sounds are normal       Palpations: Abdomen is soft  Tenderness: There is no abdominal tenderness  Musculoskeletal:      Cervical back: Normal range of motion  Skin:     General: Skin is warm and dry  Neurological:      Mental Status: She is alert and oriented to person, place, and time  Psychiatric:         Mood and Affect: Mood normal        Left Hand Exam     Other   Erythema: absent  Sensation: normal    Comments:  Thumb spica splint intact   No loosening or skin breakdown around splint  Actively moving other fingers  Nontender over index through small fingers  Capillary refill brisk            Lab Results: I have personally reviewed pertinent lab results  Imaging Studies: I have personally reviewed pertinent films in PACS  X-ray left hand: minimally angulated nondisplaced fracture at base of proximal phalanx of thumb

## 2021-06-20 NOTE — ASSESSMENT & PLAN NOTE
80year old female suffered from a fall which resulted in thumb fracture of her left arm    - PT/OT recommending rehab  But patient declining rehab  Discussed with CM  CM will reach out to her facility [facility] determine if they have the resources to provide this need without her going to rehab

## 2021-06-20 NOTE — SOCIAL WORK
CM met with the patient at bedside to do a general SW assessment and address discharge recommendations: The patient is LOS day 1  She is not a bundle  She is not a re-admission  She is here for a thumb fracture, UTI  The patient lives alone at [address] ([facility])  They are single story, 0STE  She has a cane and a RW at home if needed  She is independent with ADLs and Ambulation  She drives  She has been using the RW more recently  Her PCP is Dr Cecelia Cook  She has a LW/AD - a copy was made and placed in her chart for scanning today  Her POA if competency is lost is her friend, [phone]  No D&A use  No MH history  She has never been to the STR at [facility]  Discussed PT and OT recs for STR - she is clear that she does not wish to go to the SNF for rehab, she would like to return to her [facility] where she lives alone  She reports that [facility] can provide PT in the home everyday and send additional support  CM sent a referral to [facility] in [facility] to further discuss services at their ILF they are able to provide  When she is ready to dc back to [facility] (wether to SNF or ILF) she would like to use [facility]  She is tentatively set up with [facility] transport for Monday 6/20 at 3:30pm     DANIEL harmon

## 2021-06-20 NOTE — ASSESSMENT & PLAN NOTE
Continue empiric ceftriaxone  Await sensitivites      Results from last 7 days   Lab Units 06/18/21  1457   URINE CULTURE  70,000-79,000 cfu/ml Staphylococcus aureus*

## 2021-06-20 NOTE — DISCHARGE INSTRUCTIONS
Discharge Instructions - Orthopedics  Khloe Pearl 80 y o  female MRN: [id number]  Unit/Bed#: E2 -01    Weight Bearing Status:                                           NWB left upper extremity  May use platform attachment if walker is needed  Pain:  Continue analgesics as directed    Splint Instructions:   Please keep clean, dry and intact until follow up  DO NOT REMOVE  Must cover to bathe  Appt Instructions: If you do not have your appointment, please call the clinic at [phone] to schedule follow up with Dr Shaniqua Trevizo in 1-2 weeks  Otherwise followup as scheduled     Contact the office sooner if you experience any increased numbness/tingling in the extremities        Miscellaneous:  Ice and elevation to left upper extremity

## 2021-06-20 NOTE — ASSESSMENT & PLAN NOTE
Rate controlled  - Continue once daily atenolol 50mg daily  - Anticoagulated with warfarin    Recent Labs     06/18/21  0936 06/19/21  0455 06/20/21  0831   INR 2 37* 3 05* 2 62*

## 2021-06-21 VITALS
BODY MASS INDEX: 24.14 KG/M2 | TEMPERATURE: 98.5 F | HEART RATE: 82 BPM | RESPIRATION RATE: 20 BRPM | OXYGEN SATURATION: 95 % | DIASTOLIC BLOOD PRESSURE: 68 MMHG | SYSTOLIC BLOOD PRESSURE: 111 MMHG | HEIGHT: 61 IN | WEIGHT: 127.87 LBS

## 2021-06-21 PROBLEM — N39.0 UTI (URINARY TRACT INFECTION): Status: RESOLVED | Noted: 2021-06-18 | Resolved: 2021-06-21

## 2021-06-21 LAB
ANION GAP SERPL CALCULATED.3IONS-SCNC: 7 MMOL/L (ref 4–13)
BACTERIA UR CULT: ABNORMAL
BASOPHILS # BLD AUTO: 0.04 THOUSANDS/ΜL (ref 0–0.1)
BASOPHILS NFR BLD AUTO: 1 % (ref 0–1)
BUN SERPL-MCNC: 23 MG/DL (ref 5–25)
CALCIUM SERPL-MCNC: 8.4 MG/DL (ref 8.3–10.1)
CHLORIDE SERPL-SCNC: 105 MMOL/L (ref 100–108)
CO2 SERPL-SCNC: 29 MMOL/L (ref 21–32)
CREAT SERPL-MCNC: 0.86 MG/DL (ref 0.6–1.3)
EOSINOPHIL # BLD AUTO: 0.48 THOUSAND/ΜL (ref 0–0.61)
EOSINOPHIL NFR BLD AUTO: 6 % (ref 0–6)
ERYTHROCYTE [DISTWIDTH] IN BLOOD BY AUTOMATED COUNT: 13.2 % (ref 11.6–15.1)
GFR SERPL CREATININE-BSD FRML MDRD: 61 ML/MIN/1.73SQ M
GLUCOSE SERPL-MCNC: 97 MG/DL (ref 65–140)
HCT VFR BLD AUTO: 35.8 % (ref 34.8–46.1)
HGB BLD-MCNC: 11.8 G/DL (ref 11.5–15.4)
IMM GRANULOCYTES # BLD AUTO: 0.04 THOUSAND/UL (ref 0–0.2)
IMM GRANULOCYTES NFR BLD AUTO: 1 % (ref 0–2)
INR PPP: 2.4 (ref 0.84–1.19)
LYMPHOCYTES # BLD AUTO: 2.25 THOUSANDS/ΜL (ref 0.6–4.47)
LYMPHOCYTES NFR BLD AUTO: 28 % (ref 14–44)
MAGNESIUM SERPL-MCNC: 1.9 MG/DL (ref 1.6–2.6)
MCH RBC QN AUTO: 31.2 PG (ref 26.8–34.3)
MCHC RBC AUTO-ENTMCNC: 33 G/DL (ref 31.4–37.4)
MCV RBC AUTO: 95 FL (ref 82–98)
MONOCYTES # BLD AUTO: 0.84 THOUSAND/ΜL (ref 0.17–1.22)
MONOCYTES NFR BLD AUTO: 11 % (ref 4–12)
NEUTROPHILS # BLD AUTO: 4.31 THOUSANDS/ΜL (ref 1.85–7.62)
NEUTS SEG NFR BLD AUTO: 53 % (ref 43–75)
NRBC BLD AUTO-RTO: 0 /100 WBCS
PLATELET # BLD AUTO: 213 THOUSANDS/UL (ref 149–390)
PMV BLD AUTO: 9.1 FL (ref 8.9–12.7)
POTASSIUM SERPL-SCNC: 3.4 MMOL/L (ref 3.5–5.3)
PROTHROMBIN TIME: 25.7 SECONDS (ref 11.6–14.5)
RBC # BLD AUTO: 3.78 MILLION/UL (ref 3.81–5.12)
SODIUM SERPL-SCNC: 141 MMOL/L (ref 136–145)
WBC # BLD AUTO: 7.96 THOUSAND/UL (ref 4.31–10.16)

## 2021-06-21 PROCEDURE — 85025 COMPLETE CBC W/AUTO DIFF WBC: CPT | Performed by: STUDENT IN AN ORGANIZED HEALTH CARE EDUCATION/TRAINING PROGRAM

## 2021-06-21 PROCEDURE — 83735 ASSAY OF MAGNESIUM: CPT | Performed by: STUDENT IN AN ORGANIZED HEALTH CARE EDUCATION/TRAINING PROGRAM

## 2021-06-21 PROCEDURE — 97535 SELF CARE MNGMENT TRAINING: CPT

## 2021-06-21 PROCEDURE — 97110 THERAPEUTIC EXERCISES: CPT

## 2021-06-21 PROCEDURE — 85610 PROTHROMBIN TIME: CPT | Performed by: STUDENT IN AN ORGANIZED HEALTH CARE EDUCATION/TRAINING PROGRAM

## 2021-06-21 PROCEDURE — 97530 THERAPEUTIC ACTIVITIES: CPT

## 2021-06-21 PROCEDURE — 97116 GAIT TRAINING THERAPY: CPT

## 2021-06-21 PROCEDURE — 99239 HOSP IP/OBS DSCHRG MGMT >30: CPT | Performed by: INTERNAL MEDICINE

## 2021-06-21 PROCEDURE — 80048 BASIC METABOLIC PNL TOTAL CA: CPT | Performed by: STUDENT IN AN ORGANIZED HEALTH CARE EDUCATION/TRAINING PROGRAM

## 2021-06-21 RX ORDER — POTASSIUM CHLORIDE 20 MEQ/1
40 TABLET, EXTENDED RELEASE ORAL ONCE
Status: COMPLETED | OUTPATIENT
Start: 2021-06-21 | End: 2021-06-21

## 2021-06-21 RX ORDER — WARFARIN SODIUM 2.5 MG/1
TABLET ORAL
Refills: 0
Start: 2021-06-22

## 2021-06-21 RX ORDER — WARFARIN SODIUM 2.5 MG/1
TABLET ORAL
Refills: 0
Start: 2021-06-21

## 2021-06-21 RX ADMIN — Medication 1000 UNITS: at 09:28

## 2021-06-21 RX ADMIN — POTASSIUM CHLORIDE 40 MEQ: 1500 TABLET, EXTENDED RELEASE ORAL at 09:29

## 2021-06-21 RX ADMIN — HYDROMORPHONE HYDROCHLORIDE 2 MG: 2 TABLET ORAL at 11:40

## 2021-06-21 RX ADMIN — FUROSEMIDE 20 MG: 20 TABLET ORAL at 09:28

## 2021-06-21 RX ADMIN — ATENOLOL 50 MG: 50 TABLET ORAL at 09:28

## 2021-06-21 RX ADMIN — HYDROMORPHONE HYDROCHLORIDE 2 MG: 2 TABLET ORAL at 09:28

## 2021-06-21 RX ADMIN — GLYCERIN, HYPROMELLOSE, POLYETHYLENE GLYCOL 1 DROP: .2; .2; 1 LIQUID OPHTHALMIC at 09:30

## 2021-06-21 RX ADMIN — DICLOFENAC SODIUM 2 G: 10 GEL TOPICAL at 09:30

## 2021-06-21 RX ADMIN — DICLOFENAC SODIUM 2 G: 10 GEL TOPICAL at 11:40

## 2021-06-21 NOTE — PLAN OF CARE
Problem: PAIN - ADULT  Goal: Verbalizes/displays adequate comfort level or baseline comfort level  Description: Interventions:  - Encourage patient to monitor pain and request assistance  - Assess pain using appropriate pain scale  - Administer analgesics based on type and severity of pain and evaluate response  - Implement non-pharmacological measures as appropriate and evaluate response  - Consider cultural and social influences on pain and pain management  - Notify physician/advanced practitioner if interventions unsuccessful or patient reports new pain  Outcome: Progressing     Problem: INFECTION - ADULT  Goal: Absence or prevention of progression during hospitalization  Description: INTERVENTIONS:  - Assess and monitor for signs and symptoms of infection  - Monitor lab/diagnostic results  - Monitor all insertion sites, i e  indwelling lines, tubes, and drains  - Monitor endotracheal if appropriate and nasal secretions for changes in amount and color  - [place] appropriate cooling/warming therapies per order  - Administer medications as ordered  - Instruct and encourage patient and family to use good hand hygiene technique  - Identify and instruct in appropriate isolation precautions for identified infection/condition  Outcome: Progressing  Goal: Absence of fever/infection during neutropenic period  Description: INTERVENTIONS:  - Monitor WBC    Outcome: Progressing     Problem: Potential for Falls  Goal: Patient will remain free of falls  Description: INTERVENTIONS:  - Educate patient/family on patient safety including physical limitations  - Instruct patient to call for assistance with activity   - Consult OT/PT to assist with strengthening/mobility   - Keep Call bell within reach  - Keep bed low and locked with side rails adjusted as appropriate  - Keep care items and personal belongings within reach  - Initiate and maintain comfort rounds  - Make Fall Risk Sign visible to staff  - Offer Toileting every 2 Hours, in advance of need  - Initiate/Maintain bed alarm    - Apply yellow socks and bracelet for high fall risk patients  - Consider moving patient to room near nurses station  Outcome: Progressing

## 2021-06-21 NOTE — CASE MANAGEMENT
Pt is ready for d/c today  CM met with pt at bedside  Pt continues to refused in patient rehab  Spoke with Minnie Roche at [facility], [place] and [facility] in her [facility]  Patient does not have nursing needs  Pt aware of 3:30 PM [place]  today

## 2021-06-21 NOTE — ASSESSMENT & PLAN NOTE
Rate controlled  - Continue once daily atenolol 50mg daily  - Anticoagulated with warfarin  -pt follows with dr Lesa Esparza     06/19/21  0455 06/20/21  0831 06/21/21  0551   INR 3 05* 2 62* 2 40*

## 2021-06-21 NOTE — DISCHARGE SUMMARY
Discharge Summary - [address] [facility]    Patient Information: Robie Jeans 80 y o  female MRN: [id number]  Unit/Bed#: E2 -01 Encounter: [id number]    Discharging Physician / Practitioner: Lashawn Suarez DO  PCP: Alexandra Mead MD  Admission Date: 6/18/2021  Discharge Date: 06/21/21    Disposition:     Home with VNA Services (Reminder: Complete face to face encounter)     Reason for Admission: thumb fracture    Discharge Diagnoses:     Principal Problem:    Thumb fracture  Active Problems:    Paroxysmal atrial fibrillation (HCC)    Benign essential hypertension    Chronic diastolic (congestive) heart failure (HCC)    Chronic pain  Resolved Problems:    UTI (urinary tract infection)      Consultations During Hospital Stay:  · ortho    Procedures Performed:     · none    Significant Findings / Test Results:   XR chest 1 view portable  Result Date: 6/18/2021  Impression: Persistent cardiomegaly  Increased vascular congestion  XR wrist 3+ views LEFT  Result Date: 6/18/2021  Impression: 1st proximal phalanx fracture again noted  XR hand 3+ views LEFT  Result Date: 6/18/2021  Impression: 1st proximal phalanx fracture The study was marked in EPIC for immediate notification  TRAUMA - CT head wo contrast  Result Date: 6/18/2021  Impression: No acute intracranial abnormality  Nonemergent findings above  TRAUMA - CT spine cervical wo contrast  Result Date: 6/18/2021  Impression: No cervical spine fracture or traumatic malalignment  Nonemergent findings above  Incidental Findings:   · none    Test Results Pending at Discharge (will require follow up):   · none     Outpatient Tests Requested:  inr on usual scheduled date  inr at discharged was 2 4     Hospital Course:     Robie Jeans is a 80 y o  female patient who originally presented to the hospital on 6/18/2021 due to ed after a fall  She sustained a left non displaced proximal phalanx fracture of her thumb   She was evaluated by ortho and she was treated conservatively with thumb spica splint  She is to be nwb to [facility] and to follow up with Dr Isaias Sánchez in 1-2 weeks  [facility]  Discharge Day Visit / Exam:     * Please refer to separate progress note for these details *    Discharge instructions/Information to patient and family:   See after visit summary for information provided to patient and family  Provisions for Follow-Up Care:  See after visit summary for information related to follow-up care and any pertinent home health orders  Discharge Statement:  I spent 33 minutes discharging the patient  This time was spent on the day of discharge  I had direct contact with the patient on the day of discharge  Greater than 50% of the total time was spent examining patient, answering all patient questions, arranging and discussing plan of care with patient as well as directly providing post-discharge instructions  Additional time then spent on discharge activities  Discharge Medications:  See after visit summary for reconciled discharge medications provided to patient and family

## 2021-06-21 NOTE — PHYSICAL THERAPY NOTE
Physical Therapy Progress Note     06/21/21 0843   PT Last Visit   PT Visit Date 06/21/21   Note Type   Note Type Treatment   Pain Assessment   Pain Assessment Tool Pain Assessment not indicated - pt denies pain   Pain Score No Pain   Restrictions/Precautions   Weight Bearing Precautions Per Order Yes   LUE Weight Bearing Per Order NWB   Braces or Orthoses Splint  (thumb spica LUE)   Other Precautions Bed Alarm; Fall Risk;WBS;Cognitive   General   Chart Reviewed Yes   Response to Previous Treatment Patient reporting fatigue but able to participate  Family/Caregiver Present No   Subjective   Subjective Willing to participate in therpay this AM    Bed Mobility   Supine to Sit 4  Minimal assistance   Additional items Assist x 1;HOB elevated; Bedrails;Leg ; Increased time required;Verbal cues;LE management   Transfers   Sit to Stand 4  Minimal assistance   Additional items Assist x 1; Armrests; Bedrails; Increased time required;Verbal cues   Stand to Sit 4  Minimal assistance   Additional items Assist x 1;Bedrails;Armrests; Increased time required;Verbal cues   Toilet transfer 4  Minimal assistance   Additional items Assist x 1; Armrests; Increased time required;Verbal cues;Standard toilet  (use of grab bar)   Ambulation/Elevation   Gait pattern Decreased foot clearance; Forward Flexion; Short stride; Step to;Excessively slow; Inconsistent german; Shuffling;Poor UE support   Gait Assistance 4  Minimal assist   Additional items Assist x 1;Verbal cues; Tactile cues   Assistive Device Rolling walker;Platform walker   Distance 15' x 1, 35' x 1 with seated toileting break in between   Balance   Static Sitting Fair +   Dynamic Sitting Fair   Static Standing Fair -   Dynamic Standing Fair -   Ambulatory Poor +   Endurance Deficit   Endurance Deficit Yes   Endurance Deficit Description fatigue/weakness   Activity Tolerance   Activity Tolerance Patient limited by fatigue   Medical Staff [address], [address] Yes Exercises   THR Supine;Sitting;10 reps;AAROM; Bilateral   Assessment   Prognosis Good   Problem List Decreased strength;Decreased range of motion;Decreased endurance; Impaired balance;Decreased mobility; Decreased cognition; Impaired judgement;Decreased safety awareness;Decreased skin integrity;Orthopedic restrictions   Assessment Pt  supine in bed upon my arrival  Pt  reporting fatigue, however agreeable to therapeutic intervention  Performance of HEP with cues provided for proper completion  Progressed with transfers requiring A of therapist with cues for hand placement/technique  Pt  required constant verbal/tactile cueing to maintain NWB status of LUE throughout treatment session  Progressed with increased amb  trials x 2 with use of RW and cues provided for LE sequencing  Pt  requested to use br, female OTR present for A with pericare  Pt  continued with a second increased amb  trial with eventual return to seated in bedside chair  Pt  remained seated in bedside chair at end of treatment session  PT will continue to recommend d/c to rehab when medically stable for continued improvement of noted impairments  Barriers to Discharge Decreased caregiver support; Inaccessible home environment   Barriers to Discharge Comments Level of support at home  Goals   Patient Goals To get better  STG Expiration Date 07/03/21   PT Treatment Day 2   Plan   Treatment/Interventions Functional transfer training;LE strengthening/ROM; Therapeutic exercise; Endurance training;Gait training;Bed mobility;Spoke to nursing;Spoke to case management;OT   Progress Progressing toward goals   PT Frequency Other (Comment)  (4-5x/wk)   Recommendation   PT Discharge Recommendation Post acute rehabilitation services   Equipment Recommended [facility] Recommended Wheeled walker   Change/add to Parametric?  Yes, Add Accessories   Walker Accessories Platform attachment - left arm   PT - OK to Discharge Yes  (if d/c to rehab when medically stable  )   AM-PAC Basic Mobility Inpatient   Turning in Bed Without Bedrails 3   Lying on Back to Sitting on Edge of Flat Bed 3   Moving Bed to Chair 3   Standing Up From Chair 3   Walk in Room 3   Climb 3-5 Stairs 1   Basic Mobility Inpatient Raw Score 16   Basic Mobility Standardized Score 38 32     An AM-PAC Basic Mobility standardized score less than 42 9 suggests the patient may benefit from discharge to post-acute rehab services      Sugar Aguilera, PTA

## 2021-06-21 NOTE — OCCUPATIONAL THERAPY NOTE
[address] Progress Note     Patient Name: Shruti Bradshaw  LNDUN'A Date: 6/21/2021  Problem List  Principal Problem:    Thumb fracture  Active Problems:    Paroxysmal atrial fibrillation (HCC)    Benign essential hypertension    Chronic diastolic (congestive) heart failure (HCC)    Chronic pain    UTI (urinary tract infection)          06/21/21 0841   OT Last Visit   OT Visit Date 06/21/21   Note Type   Note Type Treatment   Restrictions/Precautions   Weight Bearing Precautions Per Order Yes   LUE Weight Bearing Per Order NWB   Braces or Orthoses Splint  (thumb spica)   Other Precautions Bed Alarm;WBS;Fall Risk   General   Response to Previous Treatment Patient with no complaints from previous session   Lifestyle   Autonomy At baseline, pt was I w/ ADLs and functional mobility/transfers w/ use of SPC vs Rollator (recently using Rollator more frequently), required assist w/ IADLs, (-) , and reports 1 fall PTA   Reciprocal Relationships Facility staff   Service to Others Retired- Pharmacist    Intrinsic Gratification Watching TV   Pain Assessment   Pain Assessment Tool Pain Assessment not indicated - pt denies pain   Pain Score No Pain   ADL   Grooming Assistance 5  Supervision/Setup   Grooming Deficit Setup;Supervision/safety; Increased time to complete;Wash/dry hands   Grooming Comments Standing at sink   LB Dressing Assistance 3  Moderate Assistance   LB Dressing Deficit Setup;Steadying; Requires assistive device for steadying;Supervision/safety;Verbal cueing; Increased time to complete; Don/doff R sock; Don/doff L sock   Toileting Assistance  4  Minimal Assistance   Toileting Deficit Setup;Steadying;Verbal cueing;Supervison/safety; Increased time to complete;Clothing management up;Clothing management down;Perineal hygiene   Functional Standing Tolerance   Time 3-4 mins   Activity Dynamic standing balance activity   Comments Min A for balance/steadying   Bed Mobility   Supine to Sit 4  Minimal assistance Additional items Assist x 1;HOB elevated; Bedrails; Increased time required;Verbal cues   Sit to Supine Unable to assess   Additional Comments Pt seated OOB in chair at end of session with call bell and phone within reach  All needs met and pt reports no further questions for OT at this time  Transfers   Sit to Stand 4  Minimal assistance   Additional items Assist x 1; Increased time required;Verbal cues   Stand to Sit 4  Minimal assistance   Additional items Assist x 1; Armrests; Increased time required;Verbal cues   Toilet transfer 4  Minimal assistance   Additional items Assist x 1; Increased time required;Verbal cues;Standard toilet  (grab bar use on R)   Additional Comments Cues for safe technique and hand placement  Decreased compliance to NWB L UE despite cues from therapist   Functional Mobility   Functional Mobility 4  Minimal assistance   Additional Comments Assist x1   Additional items Rolling walker  (w/ L UE platform)   Toilet Transfers   Toilet Transfer From Rolling walker   Toilet Transfer Type To and from   Toilet Transfer to Standard toilet   Toilet Transfer Technique Ambulating   Toilet Transfers Minimal assistance;Verbal cues   Toilet Transfers Comments Assist x1 w/ grab bar use   Cognition   Overall Cognitive Status Impaired   Arousal/Participation Alert; Cooperative   Attention Attends with cues to redirect   Orientation Level Oriented X4   Memory Decreased recall of precautions   Following Commands Follows one step commands with increased time or repetition   Activity Tolerance   Activity Tolerance Patient tolerated treatment well   Medical Staff Made Aware BONI Rodríguez RN   Assessment   Assessment Pt seen for OT treatment session focusing on functional activity tolerance, bed mobility, ADLs, and functional mobility/transfers  Reviewed ortho consult: "NWB L UE, may use platform attachment if needs walker for assistance " Pt alert and cooperative throughout session   Pt lying supine at start of session, completing bed mobility (supine>sit) w/ Min A with assist for trunk support and cues for NWB L LE  LB dressing completed w/ Mod A seated EOB with impaired functional reach demonstrated and decreased [facility] L hand 2* thumb spica splint donned  Transfers (sit<>stand) completed w/ Min A with cues for safe technique, hand placement, and WBS  Pt w/ decreased compliance to NWB L UE despite increased cues from therapists  Min A required for functional mobility with assist for balance/steadying with use of RW with L UE platform  Toileting tasks completed w/ Min A with CGA required for clothing management up/down 2* decreased dynamic standing balance demonstrated  Grooming tasks completed w/ Supervision while standing at sink to wash/dry hands  Pt seated OOB in chair at end of session with call bell and phone within reach  All needs met and pt reports no further questions for OT at this time  The patient's raw score on the AM-PAC Daily Activity inpatient short form is 18, standardized score is 38 66, less than 39 4  Patients at this level are likely to benefit from discharge to post-acute rehabilitation services  Please refer to the recommendation of the Occupational Therapist for safe discharge planning  Continue to recommend STR when medically cleared  OT to follow pt on caseload  Plan   Treatment Interventions ADL retraining;Functional transfer training;UE strengthening/ROM; Endurance training;Patient/family training;Equipment evaluation/education; Compensatory technique education; Activityengagement   Goal Expiration Date 07/03/21   OT Treatment Day 1   OT Frequency 3-5x/wk   Recommendation   OT Discharge Recommendation Post acute rehabilitation services   OT - OK to Discharge Yes  (when medically cleared to rehab)   [facility] Daily Activity Inpatient   Lower Body Dressing 2   Bathing 3   Toileting 3   Upper Body Dressing 3   Grooming 3   Eating 4   Daily Activity Raw Score 18   Daily Activity Standardized Score (Calc for Raw Score >=11) 38 66   AM-PAC Applied Cognition Inpatient   Following a Speech/Presentation 3   Understanding Ordinary Conversation 4   Taking Medications 3   Remembering Where Things Are Placed or Put Away 3   Remembering List of 4-5 Errands 3   Taking Care of Complicated Tasks 3   Applied Cognition Raw Score 19   Applied Cognition Standardized Score 39 77          Ade Jordan, OTR/L

## 2021-06-21 NOTE — PROGRESS NOTES
[facility]  Progress Note - Teresa Muñoz 7/20/1932, 80 y o  female MRN: [id number]  Unit/Bed#: E2 -01 Encounter: [id number]  Primary Care Provider: Carrie Martino MD   Date and time admitted to hospital: 6/18/2021  9:15 AM    UTI (urinary tract infection)  Assessment & Plan  Pt treated with 3 days of ceftriaxone  Urine culture below  No further antibiotics are required  Results from last 7 days   Lab Units 06/18/21  1457   URINE CULTURE  70,000-79,000 cfu/ml Staphylococcus aureus*               Chronic pain  Assessment & Plan  With opioid dependence  Continue hydromorphone 2 mg q i d  Chronic diastolic (congestive) heart failure [facility])  Assessment & Plan  Wt Readings from Last 3 Encounters:   06/18/21 58 kg (127 lb 13 9 oz)   05/14/21 56 2 kg (123 lb 12 8 oz)   04/28/21 56 7 kg (125 lb)     · Continue furosemide 20 mg daily    Benign essential hypertension  Assessment & Plan  Stable  Continue amlodipine and atenolol    Paroxysmal atrial fibrillation (HCC)  Assessment & Plan  Rate controlled  - Continue once daily atenolol 50mg daily  - Anticoagulated with warfarin  -pt follows with dr Lisa Maynard     06/19/21  0455 06/20/21  0831 06/21/21  0551   INR 3 05* 2 62* 2 40*         * Thumb fracture  Assessment & Plan  80year old female suffered from a fall which resulted in left non displaced proxmial phalanx fracture of thumb  - appreciate ortho eval   -continue conservative treatment with thumb spica splint  -nwb to [facility]  -follow up with Dr Ricco Bansal in 1-2 weeks  Discharge Plan / Estimated Discharge Date: discharge to [facility] independent living with hhpt    Code Status: Level 3 - DNAR and DNI      Subjective:   Pt seen and examined  Pt was ambulating room with help of nurses aide and walker  Discussed with her about returning to [address] living with h[facility]pt at [facility]  She is very excited about this  No problems were reported       Objective: Vitals:   Temp (24hrs), Av 2 °F (36 8 °C), Min:97 7 °F (36 5 °C), Max:98 5 °F (36 9 °C)    Temp:  [97 7 °F (36 5 °C)-98 5 °F (36 9 °C)] 98 5 °F (36 9 °C)  HR:  [82-85] 82  Resp:  [16-20] 20  BP: (106-123)/(17-68) 111/68  SpO2:  [94 %-96 %] 95 %  Body mass index is 24 16 kg/m²  Input and Output Summary (last 24 hours):     No intake or output data in the 24 hours ending 21 1117    Physical Exam:   Physical Exam  Constitutional:       Appearance: Normal appearance  HENT:      Head: Normocephalic and atraumatic  Eyes:      Extraocular Movements: Extraocular movements intact  Pupils: Pupils are equal, round, and reactive to light  Cardiovascular:      Rate and Rhythm: Normal rate and regular rhythm  Heart sounds: No murmur heard  No friction rub  No gallop  Pulmonary:      Effort: Pulmonary effort is normal  No respiratory distress  Breath sounds: Normal breath sounds  No wheezing or rales  Abdominal:      General: Bowel sounds are normal  There is no distension  Palpations: Abdomen is soft  Tenderness: There is no abdominal tenderness  There is no guarding  Musculoskeletal:      Comments: Left hand in splint   Neurological:      Mental Status: She is alert and oriented to person, place, and time          Additional Data:     Labs:  Results from last 7 days   Lab Units 21  0551   WBC Thousand/uL 7 96   HEMOGLOBIN g/dL 11 8   HEMATOCRIT % 35 8   PLATELETS Thousands/uL 213   NEUTROS PCT % 53   LYMPHS PCT % 28   MONOS PCT % 11   EOS PCT % 6     Results from last 7 days   Lab Units 21  0551 21  0455   SODIUM mmol/L 141 142   POTASSIUM mmol/L 3 4* 3 2*   CHLORIDE mmol/L 105 104   CO2 mmol/L 29 28   BUN mg/dL 23 18   CREATININE mg/dL 0 86 0 89   ANION GAP mmol/L 7 10   CALCIUM mg/dL 8 4 8 6   ALBUMIN g/dL  --  2 6*   TOTAL BILIRUBIN mg/dL  --  0 66   ALK PHOS U/L  --  60   ALT U/L  --  18   AST U/L  --  25   GLUCOSE RANDOM mg/dL 97 111     Results from last 7 days   Lab Units 06/21/21  0551   INR  2 40*                   Recent Cultures (last 7 days):     Results from last 7 days   Lab Units 06/18/21  1457   URINE CULTURE  70,000-79,000 cfu/ml Staphylococcus aureus*       Lines/Drains:  Invasive Devices     Peripheral Intravenous Line            Peripheral IV 06/18/21 Left Antecubital 3 days                Last 24 Hours Medication List:   Current Facility-Administered Medications   Medication Dose Route Frequency Provider Last Rate    acetaminophen  650 mg Oral Q6H PRN Ana Osmaney, DO      amLODIPine  2 5 mg Oral Daily DIANE Cartwright      atenolol  50 mg Oral Daily Ana Osmaney, DO      cefTRIAXone  1,000 mg Intravenous Q24H Rito Villa, DO 1,000 mg (06/20/21 1604)    cholecalciferol  1,000 Units Oral Daily Rito Villa, DO      Diclofenac Sodium  2 g Topical 4x Daily Rito Villa, DO      furosemide  20 mg Oral Daily Ana Osmaney, DO      glycerin-hypromellose-  1 drop Both Eyes TID Ana Lawler, DO      HYDROmorphone  2 mg Oral 4x Daily Rito Villa, DO      ondansetron  4 mg Intravenous Q4H PRN Ana Lawler, DO      warfarin  2 5 mg Oral Once per day on Sun Tue Thu Rito Villa DO      And    warfarin  1 25 mg Oral Once per day on Mon Wed Fri Sat Ana Lawler,           Today, Patient Was Seen By: Estella Dubois DO

## 2021-06-21 NOTE — PLAN OF CARE
Problem: OCCUPATIONAL THERAPY ADULT  Goal: Performs self-care activities at highest level of function for planned discharge setting  See evaluation for individualized goals  Description: Treatment Interventions: ADL retraining, Functional transfer training, UE strengthening/ROM, Endurance training, Patient/family training, Equipment evaluation/education, Compensatory technique education, Continued evaluation, Activityengagement          See flowsheet documentation for full assessment, interventions and recommendations  Outcome: Progressing  Note: Limitation: Decreased ADL status, Decreased UE strength, Decreased Safe judgement during ADL, Decreased endurance, Decreased self-care trans, Decreased high-level ADLs, Decreased fine motor control  Prognosis: Good  Assessment: Pt seen for OT treatment session focusing on functional activity tolerance, bed mobility, ADLs, and functional mobility/transfers  Reviewed ortho consult: "NWB L UE, may use platform attachment if needs walker for assistance " Pt alert and cooperative throughout session  Pt lying supine at start of session, completing bed mobility (supine>sit) w/ Min A with assist for trunk support and cues for NWB L LE  LB dressing completed w/ Mod A seated EOB with impaired functional reach demonstrated and decreased [address] L hand 2* thumb spica splint donned  Transfers (sit<>stand) completed w/ Min A with cues for safe technique, hand placement, and WBS  Pt w/ decreased compliance to NWB L UE despite increased cues from therapists  Min A required for functional mobility with assist for balance/steadying with use of RW with L UE platform  Toileting tasks completed w/ Min A with CGA required for clothing management up/down 2* decreased dynamic standing balance demonstrated  Grooming tasks completed w/ Supervision while standing at sink to wash/dry hands  Pt seated OOB in chair at end of session with call bell and phone within reach   All needs met and pt reports no further questions for OT at this time  The patient's raw score on the AM-PAC Daily Activity inpatient short form is 18, standardized score is 38 66, less than 39 4  Patients at this level are likely to benefit from discharge to post-acute rehabilitation services  Please refer to the recommendation of the Occupational Therapist for safe discharge planning  Continue to recommend STR when medically cleared  OT to follow pt on caseload        OT Discharge Recommendation: Post acute rehabilitation services  OT - OK to Discharge: Yes (when medically cleared to rehab)

## 2021-06-21 NOTE — ASSESSMENT & PLAN NOTE
Pt treated with 3 days of ceftriaxone  Urine culture below  No further antibiotics are required       Results from last 7 days   Lab Units 06/18/21  1457   URINE CULTURE  70,000-79,000 cfu/ml Staphylococcus aureus*

## 2021-06-21 NOTE — PLAN OF CARE
Problem: PHYSICAL THERAPY ADULT  Goal: Performs mobility at highest level of function for planned discharge setting  See evaluation for individualized goals  Description:  Outcome: Progressing  Note: Prognosis: Good  Problem List: Decreased strength, Decreased range of motion, Decreased endurance, Impaired balance, Decreased mobility, Decreased cognition, Impaired judgement, Decreased safety awareness, Decreased skin integrity, Orthopedic restrictions  Assessment: Pt  supine in bed upon my arrival  Pt  reporting fatigue, however agreeable to therapeutic intervention  Performance of HEP with cues provided for proper completion  Progressed with transfers requiring A of therapist with cues for hand placement/technique  Pt  required constant verbal/tactile cueing to maintain NWB status of LUE throughout treatment session  Progressed with increased amb  trials x 2 with use of RW and cues provided for LE sequencing  Pt  requested to use br, female OTR present for A with pericare  Pt  continued with a second increased amb  trial with eventual return to seated in bedside chair  Pt  remained seated in bedside chair at end of treatment session  PT will continue to recommend d/c to rehab when medically stable for continued improvement of noted impairments  Barriers to Discharge: Decreased caregiver support, Inaccessible home environment  Barriers to Discharge Comments: Level of support at home  PT Discharge Recommendation: Post acute rehabilitation services     PT - OK to Discharge: Yes (if d/c to rehab when medically stable )    See flowsheet documentation for full assessment

## 2021-06-21 NOTE — ASSESSMENT & PLAN NOTE
80year old female suffered from a fall which resulted in left non displaced proxmial phalanx fracture of thumb  - appreciate ortho eval   -continue conservative treatment with thumb spica splint  -eloisa BRUNER  -follow up with Dr Jessica Linn in 1-2 weeks

## 2021-06-22 ENCOUNTER — TRANSITIONAL CARE MANAGEMENT (OUTPATIENT)
Dept: FAMILY MEDICINE CLINIC | Facility: CLINIC | Age: 86
End: 2021-06-22

## 2021-06-24 ENCOUNTER — TELEPHONE (OUTPATIENT)
Dept: OBGYN CLINIC | Facility: HOSPITAL | Age: 86
End: 2021-06-24

## 2021-06-28 ENCOUNTER — TELEPHONE (OUTPATIENT)
Dept: LAB | Facility: HOSPITAL | Age: 86
End: 2021-06-28

## 2021-07-01 ENCOUNTER — OFFICE VISIT (OUTPATIENT)
Dept: FAMILY MEDICINE CLINIC | Facility: CLINIC | Age: 86
End: 2021-07-01
Payer: MEDICARE

## 2021-07-01 VITALS
TEMPERATURE: 96.7 F | HEIGHT: 61 IN | BODY MASS INDEX: 23.33 KG/M2 | SYSTOLIC BLOOD PRESSURE: 110 MMHG | DIASTOLIC BLOOD PRESSURE: 70 MMHG | WEIGHT: 123.6 LBS

## 2021-07-01 DIAGNOSIS — S22.000D COMPRESSION FRACTURE OF THORACIC VERTEBRA WITH ROUTINE HEALING, UNSPECIFIED THORACIC VERTEBRAL LEVEL, SUBSEQUENT ENCOUNTER: ICD-10-CM

## 2021-07-01 DIAGNOSIS — I50.32 CHRONIC DIASTOLIC (CONGESTIVE) HEART FAILURE (HCC): ICD-10-CM

## 2021-07-01 DIAGNOSIS — I10 BENIGN ESSENTIAL HYPERTENSION: ICD-10-CM

## 2021-07-01 DIAGNOSIS — G89.4 CHRONIC PAIN SYNDROME: ICD-10-CM

## 2021-07-01 DIAGNOSIS — I48.0 PAROXYSMAL ATRIAL FIBRILLATION (HCC): ICD-10-CM

## 2021-07-01 DIAGNOSIS — Z91.81 STATUS POST FALL: ICD-10-CM

## 2021-07-01 DIAGNOSIS — S62.502D CLOSED DISPLACED FRACTURE OF PHALANX OF LEFT THUMB WITH ROUTINE HEALING, UNSPECIFIED PHALANX, SUBSEQUENT ENCOUNTER: Primary | ICD-10-CM

## 2021-07-01 PROCEDURE — 99495 TRANSJ CARE MGMT MOD F2F 14D: CPT | Performed by: FAMILY MEDICINE

## 2021-07-01 NOTE — PROGRESS NOTES
Assessment/Plan:  The patient will follow-up with orthopedics appropriately next week  The patient will follow-up with pain management as well as Cardiology appropriately patient will continue with other meds for chronic conditions listed  Diagnoses and all orders for this visit:    Closed displaced fracture of phalanx of left thumb with routine healing, unspecified phalanx, subsequent encounter    Paroxysmal atrial fibrillation (HCC)    Chronic diastolic (congestive) heart failure (HCC)    Compression fracture of thoracic vertebra with routine healing, unspecified thoracic vertebral level, subsequent encounter    Benign essential hypertension    Chronic pain syndrome    Status post fall            Subjective:        Patient ID: Dwight Edwards is a 80 y o  female  Patient is here status post emergency room/hospitalization from the 18th through the 21st of June  Patient had a fall at [facility] independent [facility] in the bathroom  Patient was ultimately able to call for help  Patient was admitted with left fall thumb fracture as well as history of hypertension CHF paroxysmal AFib and chronic pain  Patient had thumb spica splint applied  Patient is nonweightbearing left arm  Patient using Rollator  Patient was seen by Orthopedics  Patient did have chest x-ray which showed some increased congestion other vasculature  X-ray of the hand showed proximal phalanx fracture of the 1st finger  CT of the head and cervical spine were negative for fracture  Patient does see Dr Jaquelin Arce in pain management  The patient does see Dr Kraft Certain for Cardiology  Patient will be seeing Dr Wayne Valera next Tuesday Orthopedics for follow-up  Patient is having some thoracic back pain as well as chronic right knee pain  The no pain left thumb  No chest pain shortness breath or abdominal pain or problems urinating or defecating    No fevers or chills or other complaints at the present time        The following portions of the patient's history were reviewed and updated as appropriate: allergies, current medications, past family history, past medical history, past social history, past surgical history and problem list       Review of Systems   Constitutional: Negative  HENT: Negative  Eyes: Negative  Respiratory: Negative  Cardiovascular: Negative  Gastrointestinal: Negative  Endocrine: Negative  Genitourinary: Negative  Musculoskeletal: Positive for arthralgias and back pain  Skin: Negative  Allergic/Immunologic: Negative  Neurological: Negative  Hematological: Negative  Psychiatric/Behavioral: Negative  Objective:        Depression Screening and Follow-up Plan: Clincally patient does not have depression  No treatment is required  /70 (BP Location: Right arm, Patient Position: Sitting, Cuff Size: Standard)   Temp (!) 96 7 °F (35 9 °C) (Tympanic)   Ht 5' 1" (1 549 m)   Wt 56 1 kg (123 lb 9 6 oz)   BMI 23 35 kg/m²          Physical Exam  Vitals and nursing note reviewed  Constitutional:       General: She is not in acute distress  Appearance: Normal appearance  She is not ill-appearing, toxic-appearing or diaphoretic  HENT:      Head: Normocephalic and atraumatic  Right Ear: Tympanic membrane, ear canal and external ear normal  There is no impacted cerumen  Left Ear: Tympanic membrane, ear canal and external ear normal  There is no impacted cerumen  Nose: Nose normal  No congestion or rhinorrhea  Mouth/Throat:      Mouth: Mucous membranes are moist       Pharynx: No oropharyngeal exudate or posterior oropharyngeal erythema  Eyes:      General: No scleral icterus  Right eye: No discharge  Left eye: No discharge  Extraocular Movements: Extraocular movements intact  Conjunctiva/sclera: Conjunctivae normal       Pupils: Pupils are equal, round, and reactive to light  Neck:      Vascular: No carotid bruit  Cardiovascular:      Rate and Rhythm: Normal rate and regular rhythm  Pulses: Normal pulses  Heart sounds: Normal heart sounds  No murmur heard  No friction rub  No gallop  Pulmonary:      Effort: Pulmonary effort is normal  No respiratory distress  Breath sounds: Normal breath sounds  No stridor  No wheezing, rhonchi or rales  Chest:      Chest wall: No tenderness  Abdominal:      General: Abdomen is flat  Bowel sounds are normal  There is no distension  Palpations: Abdomen is soft  Tenderness: There is no abdominal tenderness  There is no guarding or rebound  Musculoskeletal:         General: Tenderness and deformity present  No swelling or signs of injury  Cervical back: Normal range of motion and neck supple  No rigidity  No muscular tenderness  Right lower leg: No edema  Left lower leg: No edema  Comments: Mild back pain right thoracic region with palpation the left thumb in thumb spica splint  Right knee osteoarthritis   Lymphadenopathy:      Cervical: No cervical adenopathy  Skin:     General: Skin is warm and dry  Capillary Refill: Capillary refill takes less than 2 seconds  Coloration: Skin is not jaundiced  Findings: No bruising, erythema, lesion or rash  Neurological:      Mental Status: She is alert and oriented to person, place, and time  Cranial Nerves: No cranial nerve deficit  Sensory: No sensory deficit  Motor: No weakness  Coordination: Coordination normal       Gait: Gait abnormal    Psychiatric:         Mood and Affect: Mood normal          Behavior: Behavior normal          Thought Content:  Thought content normal          Judgment: Judgment normal

## 2021-07-01 NOTE — PROGRESS NOTES
TCM Call (since 5/31/2021)     Date and time call was made  6/22/2021  1:09 PM    Patient was hospitialized at  [address]        Date of Admission  06/18/21    Date of discharge  06/21/21    Diagnosis  THUMB FX    Disposition  Home    Were the patients medications reviewed and updated  Yes    Current Symptoms  None      TCM Call (since 5/31/2021)     Post hospital issues  None    Should patient be enrolled in anticoag monitoring? Yes    Scheduled for follow up?   Yes    Did you obtain your prescribed medications  Yes    Do you need help managing your prescriptions or medications  No    Is transportation to your appointment needed  Yes    Specify why  [facility]SP[facility]    I have advised the patient to call PCP with any new or worsening symptoms  [facility] [address] staff    The type of support provided  None; Emotional; Physical    Do you have social support  Yes, as much as I need    Are you recieving any outpatient services  No    Are you recieving home care services  No    Are you using any community resources  No    Current waiver services  No    Have you fallen in the last 12 months  No    Interperter language line needed  No    Counseling  Patient

## 2021-07-06 ENCOUNTER — OFFICE VISIT (OUTPATIENT)
Dept: OBGYN CLINIC | Facility: MEDICAL CENTER | Age: 86
End: 2021-07-06
Payer: MEDICARE

## 2021-07-06 VITALS
SYSTOLIC BLOOD PRESSURE: 134 MMHG | WEIGHT: 123 LBS | DIASTOLIC BLOOD PRESSURE: 76 MMHG | HEIGHT: 60 IN | HEART RATE: 76 BPM | RESPIRATION RATE: 16 BRPM | BODY MASS INDEX: 24.15 KG/M2

## 2021-07-06 DIAGNOSIS — S62.512A CLOSED FRACTURE OF BASE OF PROXIMAL PHALANX OF LEFT THUMB: Primary | ICD-10-CM

## 2021-07-06 PROCEDURE — 99214 OFFICE O/P EST MOD 30 MIN: CPT | Performed by: ORTHOPAEDIC SURGERY

## 2021-07-06 NOTE — PROGRESS NOTES
Chief Complaint     Left thumb pain     History of Present Illness     Diego Verde is a 80 y o  right hand dominant female   Who presents the office today for evaluation of her left thumb  Patient sustained injury on 06/18/2021 when she fell in her bathroom  She was evaluated at the ED where Orthopedics evaluated her and placed her in a thumb spica splint  Patient has been wearing this since the date injury  She notes no significant pain today  She does report stiffness about the thumb  She has had no prior injuries to the thumb  She notes no numbness or tingling  Patient reports after the fall she noticed deformity about her thumb, although the appearance is not bothersome to her today       Past Medical History:   Diagnosis Date    Chronic pain     Hyperlipidemia     Hypertension     L1 vertebral fracture (HCC)     Osteoporosis     Paroxysmal A-fib (HCC)     TIA (transient ischemic attack)        Past Surgical History:   Procedure Laterality Date    BACK SURGERY         Allergies   Allergen Reactions    Iodine - Food Allergy Shortness Of Breath    Acyclovir     Buprenorphine GI Intolerance and Other (See Comments)    Hydrocodone-Acetaminophen      Vomiting    Hydrocodone-Acetaminophen      Vomiting  Vomiting    Iodinated Diagnostic Agents     Latex     Metrizamide     Naproxen GI Intolerance    Shellfish Allergy - Food Allergy     Glucosamine Rash    Medical Tape Rash    Other Other (See Comments)     red raised areas - please use paper tape    Shellfish-Derived Products - Food Allergy Rash       Current Outpatient Medications on File Prior to Visit   Medication Sig Dispense Refill    amLODIPine (NORVASC) 2 5 mg tablet Take 1 tablet (2 5 mg total) by mouth daily (Patient taking differently: Take 2 5 mg by mouth daily Pt states she takes at 6 pm) 90 tablet 3    atenolol (TENORMIN) 50 mg tablet Take 1 tablet (50 mg total) by mouth daily 90 tablet 3    Cholecalciferol 2000 units CAPS Take 1 capsule by mouth      cycloSPORINE (RESTASIS) 0 05 % ophthalmic emulsion Apply 1 drop to eye every 12 (twelve) hours       diclofenac sodium (VOLTAREN) 1 % Apply 2 g topically 4 (four) times a day 6 Tube 3    furosemide (LASIX) 20 mg tablet Take 1 tablet (20 mg total) by mouth daily as needed (SOB) (Patient taking differently: Take 20 mg by mouth daily as needed (SOB) Patient states she takes 5 days a week) 90 tablet 3    HYDROmorphone (DILAUDID) 2 mg tablet Take 2 mg by mouth 4 (four) times a day       Multiple Vitamins tablet Take 1 tablet by mouth daily      warfarin (COUMADIN) 2 5 mg tablet Daily on Sunday, Tuesday, and thursdays  0    warfarin (COUMADIN) 2 5 mg tablet Daily on Monday, Wednesday, Friday, and saturday  0     No current facility-administered medications on file prior to visit  Social History     Tobacco Use    Smoking status: Former Smoker    Smokeless tobacco: Never Used   Vaping Use    Vaping Use: Never used   Substance Use Topics    Alcohol use: Never    Drug use: Never       Family History   Problem Relation Age of Onset    Heart disease Mother        Review of Systems     As stated in the HPI  All other systems were reviewed and are negative  Physical Exam     /76   Pulse 76   Resp 16   Ht 5' (1 524 m)   Wt 55 8 kg (123 lb)   BMI 24 02 kg/m²     GENERAL: This is a well-developed, well-nourished, age-appropriate patient in no acute distress  The patient is alert and oriented x3  Pleasant and cooperative  Eyes: Anicteric sclerae  Extraocular movements appear intact  HENT: Nares are patent with no drainage  Lungs: There is equal chest rise on inspection  Breathing is non-labored with no audible wheezing  Cardiovascular: No cyanosis  No upper extremity lymphadema  Skin: Skin is warm to touch  No obvious skin lesions or rashes other than described below  Neurologic: No ataxia  Psychiatric: Mood and affect are appropriate      Left thumb   Skin intact No erythema noted   Ecchymosis noted to dorsal distal phalanx proximal to the nail full  MCP hyperextension deformity  Able to flex and extend IP joint  Able to oppose to tip of small finger   Minimally tender at fracture site at proximal phalanx   Sensation intact to the radial and ulnar aspect of the digit   Brisk capillary refill noted    Data Review     Labs:  None today     Electrodiagnostic Testing:  None today     Imaging:  X-rays of the left hand obtained on 6/18/21 were personally reviewed by me in the office and demonstrate nondisplaced proximal phalanx base fracture    Assessment and Plan      Diagnoses and all orders for this visit:    Closed fracture of base of proximal phalanx of left thumb  -     Durable Medical Equipment           80year-old female with left thumb proximal phalanx base fracture  Patient and I discussed that her fracture will continue to heal over the next few weeks and that I would recommend that she wear a thumb spica brace for the next 2 weeks like a cast   After that she may discontinue use of the brace and return to activities as tolerated with the left thumb  I would not recommend any surgical intervention as her fractures nondisplaced  We did discuss that the hyperextension deformity she has is equal bilaterally hear that this is due to underlying osteoarthritis and not due to her fracture  I will see her back in 4 weeks for re-evaluation       Follow Up: 4 weeks     To Do Next Visit:  Re-evaluation    PROCEDURES PERFORMED:  Procedures  No Procedures performed today      Scribe Attestation    I,:  Brady Demarco MA am acting as a scribe while in the presence of the attending physician :       I,:  Christine Irizarry MD personally performed the services described in this documentation    as scribed in my presence :

## 2021-07-06 NOTE — PATIENT INSTRUCTIONS
Use thumb spica brace for 2 weeks at all times  In 2 weeks (7/20) may discontinue use of brace and return to normal activities as tolerated   Follow up in 4 weeks

## 2021-07-08 ENCOUNTER — ANTICOAG VISIT (OUTPATIENT)
Dept: CARDIOLOGY CLINIC | Facility: CLINIC | Age: 86
End: 2021-07-08

## 2021-07-08 ENCOUNTER — APPOINTMENT (OUTPATIENT)
Dept: LAB | Facility: HOSPITAL | Age: 86
End: 2021-07-08
Attending: INTERNAL MEDICINE
Payer: MEDICARE

## 2021-07-08 ENCOUNTER — TELEPHONE (OUTPATIENT)
Dept: OBGYN CLINIC | Facility: HOSPITAL | Age: 86
End: 2021-07-08

## 2021-07-08 DIAGNOSIS — I48.0 PAROXYSMAL ATRIAL FIBRILLATION (HCC): Primary | ICD-10-CM

## 2021-07-08 NOTE — TELEPHONE ENCOUNTER
Tried to call but no answer  Left VM for them to call back   Per note " I would recommend that she wear a thumb spica brace for the next 2 weeks like a cast   After that she may discontinue use of the brace and return to activities as tolerated with the left thumb "

## 2021-07-08 NOTE — TELEPHONE ENCOUNTER
Patient sees Dr Kraig Garcia  [facility] from [facility] called for updated protocols for the patient's occupational therapy   Call [phone]

## 2021-07-09 ENCOUNTER — TELEPHONE (OUTPATIENT)
Dept: LAB | Facility: HOSPITAL | Age: 86
End: 2021-07-09

## 2021-07-26 ENCOUNTER — APPOINTMENT (OUTPATIENT)
Dept: LAB | Facility: HOSPITAL | Age: 86
End: 2021-07-26
Attending: INTERNAL MEDICINE
Payer: MEDICARE

## 2021-07-26 ENCOUNTER — ANTICOAG VISIT (OUTPATIENT)
Dept: CARDIOLOGY CLINIC | Facility: CLINIC | Age: 86
End: 2021-07-26

## 2021-07-26 ENCOUNTER — TELEPHONE (OUTPATIENT)
Dept: LAB | Facility: HOSPITAL | Age: 86
End: 2021-07-26

## 2021-07-26 DIAGNOSIS — I48.0 PAROXYSMAL ATRIAL FIBRILLATION (HCC): Primary | ICD-10-CM

## 2021-07-26 NOTE — PROGRESS NOTES
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

## 2021-07-27 ENCOUNTER — TELEPHONE (OUTPATIENT)
Dept: LAB | Facility: HOSPITAL | Age: 86
End: 2021-07-27

## 2021-07-30 ENCOUNTER — NURSE TRIAGE (OUTPATIENT)
Dept: OTHER | Facility: OTHER | Age: 86
End: 2021-07-30

## 2021-07-30 NOTE — TELEPHONE ENCOUNTER
Regarding: Stabbing Chest Pain  ----- Message from Lori Score sent at 7/30/2021  7:25 PM EDT -----  "I haven't felt well since yesterday  I've had stabbing pains in my heart  My blood pressure is 125/70 and my pulse is at 85   My heart rate has never been that high before "

## 2021-07-30 NOTE — TELEPHONE ENCOUNTER
Reason for Disposition   Palpitations    Answer Assessment - Initial Assessment Questions  1  DESCRIPTION: "Please describe your heart rate or heartbeat that you are having" (e g , fast/slow, regular/irregular, skipped or extra beats, "palpitations")      85 when I checked it  Nurse at [facility] said to call the doctor  2  ONSET: "When did it start?" (Minutes, hours or days)       Today     3  DURATION: "How long does it last" (e g , seconds, minutes, hours)      N/A     4  PATTERN "Does it come and go, or has it been constant since it started?"  "Does it get worse with exertion?"   "Are you feeling it now?"      Usually have a lower heart rate    5  TAP: "Using your hand, can you tap out what you are feeling on a chair or table in front of you, so that I can hear?" (Note: not all patients can do this)        N/A     6  HEART RATE: "Can you tell me your heart rate?" "How many beats in 15 seconds?"  (Note: not all patients can do this)        Monitor says 85     7  RECURRENT SYMPTOM: "Have you ever had this before?" If Yes, ask: "When was the last time?" and "What happened that time?"       No    8  CAUSE: "What do you think is causing the palpitations?"      N/A     9  CARDIAC HISTORY: "Do you have any history of heart disease?" (e g , heart attack, angina, bypass surgery, angioplasty, arrhythmia)       Paroxysmal afib    10  OTHER SYMPTOMS: "Do you have any other symptoms?" (e g , dizziness, chest pain, sweating, difficulty breathing)        No chest pain or difficulty breathing      Protocols used: HEART RATE AND HEARTBEAT QUESTIONS-[facility]

## 2021-07-31 NOTE — TELEPHONE ENCOUNTER
Regarding: has increased blood pressure 130/68 and heart rate 95  ----- Message from Geo Lewis sent at 7/30/2021  8:02 PM EDT -----  "After talking to the nurse my blood pressure increased to 130/68 and so did my heat rate   Its now 95 "

## 2021-07-31 NOTE — TELEPHONE ENCOUNTER
Patient anxiously concerned if she should take more medication  Advised her not needed  Reason for Disposition   Palpitations    Answer Assessment - Initial Assessment Questions  1  DESCRIPTION: "Please describe your heart rate or heartbeat that you are having" (e g , fast/slow, regular/irregular, skipped or extra beats, "palpitations")      Fast     2  ONSET: "When did it start?" (Minutes, hours or days)       7:45pm     3  DURATION: "How long does it last" (e g , seconds, minutes, hours)      Minute     4  PATTERN "Does it come and go, or has it been constant since it started?"  "Does it get worse with exertion?"   "Are you feeling it now?"      Comes and goes     5  TAP: "Using your hand, can you tap out what you are feeling on a chair or table in front of you, so that I can hear?" (Note: not all patients can do this)        No     6  HEART RATE: "Can you tell me your heart rate?" "How many beats in 15 seconds?"  (Note: not all patients can do this)        Monitor reads 95      7  RECURRENT SYMPTOM: "Have you ever had this before?" If Yes, ask: "When was the last time?" and "What happened that time?"       Yes      8  CAUSE: "What do you think is causing the palpitations?"      Unsure     9  CARDIAC HISTORY: "Do you have any history of heart disease?" (e g , heart attack, angina, bypass surgery, angioplasty, arrhythmia)       Paroxysmal afib     10   OTHER SYMPTOMS: "Do you have any other symptoms?" (e g , dizziness, chest pain, sweating, difficulty breathing)        No cp or sob    Protocols used: HEART RATE AND HEARTBEAT QUESTIONS-[facility]-

## 2021-08-02 ENCOUNTER — TELEPHONE (OUTPATIENT)
Dept: CARDIOLOGY CLINIC | Facility: CLINIC | Age: 86
End: 2021-08-02

## 2021-08-02 ENCOUNTER — OFFICE VISIT (OUTPATIENT)
Dept: URGENT CARE | Age: 86
End: 2021-08-02
Payer: MEDICARE

## 2021-08-02 ENCOUNTER — APPOINTMENT (OUTPATIENT)
Dept: RADIOLOGY | Age: 86
End: 2021-08-02
Payer: MEDICARE

## 2021-08-02 VITALS
OXYGEN SATURATION: 99 % | WEIGHT: 122 LBS | HEIGHT: 60 IN | TEMPERATURE: 97.5 F | SYSTOLIC BLOOD PRESSURE: 144 MMHG | DIASTOLIC BLOOD PRESSURE: 90 MMHG | RESPIRATION RATE: 18 BRPM | HEART RATE: 77 BPM | BODY MASS INDEX: 23.95 KG/M2

## 2021-08-02 DIAGNOSIS — S22.060A CLOSED WEDGE COMPRESSION FRACTURE OF T8 VERTEBRA, INITIAL ENCOUNTER (HCC): Primary | ICD-10-CM

## 2021-08-02 DIAGNOSIS — M54.6 ACUTE MIDLINE THORACIC BACK PAIN: ICD-10-CM

## 2021-08-02 PROCEDURE — G0463 HOSPITAL OUTPT CLINIC VISIT: HCPCS | Performed by: PHYSICIAN ASSISTANT

## 2021-08-02 PROCEDURE — 72072 X-RAY EXAM THORAC SPINE 3VWS: CPT

## 2021-08-02 PROCEDURE — 99213 OFFICE O/P EST LOW 20 MIN: CPT | Performed by: PHYSICIAN ASSISTANT

## 2021-08-02 NOTE — TELEPHONE ENCOUNTER
Called patient because called over weekend had episode of chest pain and rapid HR  Do not see went to ED  Reached answering machine and left message stating if not feeling better to call office  If all okay no need to call

## 2021-08-02 NOTE — PROGRESS NOTES
[facility]        NAME: Violet Jordan is a 80 y o  female  : 1932    MRN: [id number]  DATE: 2021  TIME: 12:34 PM    Assessment and Plan   Acute midline thoracic back pain [M54 6]  1  Acute midline thoracic back pain  XR spine thoracic 3 vw    CANCELED: XR spine thoracic 2 vw   2  Closed wedge compression fracture of T8 vertebra, initial encounter ([facility] [address] )     Acute thoracic back pain with vague injury of history with history of osteoporosis and thoracic fractures  Recommend x-ray to rule out acute fracture  X-rays taken today demonstrate T8 fracture that was not present when compared to previous 3 view thoracic x-rays taken in May of 2019  This was the most recent comparative imaging available sugestive of acute T8 compression fracture  Discussed continued Dilauded PRN and supplement with Tylenol, follow-up with pain specialist  Pt instructed to see her PCP to discuss possible Forteo injections for treatment of osteoporosis  She will report to the ED if her symptoms worsen  Patient Instructions     Patient Instructions     Continue hydromorphone as prescribed  Supplement with Tylenol as needed  Follow-up with pain specialist   Talk to PCP about Forteo injections  If symptoms worsen report to the emergency room  Vertebral Compression Fracture   AMBULATORY CARE:   A vertebral compression fracture (VCF)  is a collapse or breakdown in a bone in your spine  Compression fractures happen when there is too much pressure on the vertebra  VCFs most often occur in the thoracic (middle) and lumbar (lower) areas of your spine  Fractures may be mild to severe  Signs and symptoms: You may not have any signs or symptoms with a mild VCF   You may have any of the following with a more severe fracture:  · Sudden, severe, and sharp back pain    · Back pain that gets worse when you stand or walk    · Muscle spasms in your back    · Problems urinating or having bowel movements    · Sudden weakness in your arms or legs    Call your local emergency number (911 in the [address]) if:   · You feel lightheaded, short of breath, and have chest pain  · You cough up blood  · You suddenly cannot feel your legs  · You suddenly have trouble moving your arms or legs  Seek care immediately if:   · Your arm or leg feels warm, tender, and painful  It may look swollen and red  · You have new problems urinating or having bowel movements  · You have severe pain in your back after falling, bending forward, sneezing, or coughing strongly  Call your doctor or orthopedist if:   · You cannot sleep or rest because of back pain  · You have pain or swelling in your back that is getting worse, or does not go away  · You have questions or concerns about your condition or care  Treatment  may include any of the following, depending on how severe the fracture is:  · Bed rest  may be needed for a mild fracture  · A back brace  may be needed for 8 to 12 weeks  A brace may decrease your pain, and help your vertebrae heal     · A cane or walker  can help you keep your balance when you walk  This helps prevent a fall that could cause more injury  · Medicines  may be given for pain  Bisphosphonates and calcitonin are medicines to help your bones get stronger  They can decrease the pain of a VCF caused by osteoporosis, and decrease your risk for another fracture  · Physical or occupational therapy  may be recommended  A physical therapist teaches you exercises to help improve movement and strength, and to decrease pain  An occupational therapist teaches you skills to help with your daily activities  · Surgery  may be needed if your pain, weakness, or numbness does not go away with other treatment  Surgery may make your spine more stable, and help decrease pressure on your spinal nerves caused by the fracture  Heat and ice:   · Apply ice  on your back for 15 to 20 minutes every hour or as directed  Use an ice pack, or put crushed ice in a plastic bag  Cover it with a towel before you apply it  Ice helps prevent tissue damage and decreases swelling and pain  · Apply heat  on your back for 20 to 30 minutes every 2 hours for as many days as directed  Heat helps decrease pain and muscle spasms  Activity:   · Avoid activities that may make the pain worse, such as picking up heavy objects  When the pain decreases, begin normal, slow movements as directed by your healthcare provider  Your healthcare provider may have you do weight-bearing exercises such as walking  You may also do non-weight-bearing exercises such as swimming and bicycling  · You may need to use a walker or cane  Ask your healthcare provider for more information about how to use a cane or a walker  · When you  objects, bend at the hips and knees  Never bend from the waist only  Use bent knees and your leg muscles as you lift the object  While you lift the object, keep it close to your chest  Try not to twist or lift anything above your waist     Physical and occupational therapy:  Your healthcare provider may recommend you start or continue one or both types of therapy  A physical therapist teaches you exercises to help improve movement and strength, and to decrease pain  An occupational therapist teaches you skills to help with your daily activities  Manage pain during sleep:   · Do not sleep on a waterbed  Waterbeds do not provide good back support  · Sleep on a firm mattress  You may also put a ½ to 1-inch piece of plywood between the mattress and box spring  · Sleep on your back with a pillow under your knees  This will decrease pressure on your back  You may also sleep on your side with 1 or both of your knees bent and a pillow between them  It may also be helpful to sleep on your stomach with a pillow under you at waist level  Follow up with your doctor or orthopedist as directed:   You may need to return for x-rays or other tests  Write down your questions so you remember to ask them during your visits  © Copyright [employer] 2021 Information is for End User's use only and may not be sold, redistributed or otherwise used for commercial purposes  All illustrations and images included in CareNotes® are the copyrighted property of [employer]  or Will Escoto  The above information is an  only  It is not intended as medical advice for individual conditions or treatments  Talk to your doctor, nurse or pharmacist before following any medical regimen to see if it is safe and effective for you  Follow up with PCP in 3-5 days  Proceed to  ER if symptoms worsen  Chief Complaint     Chief Complaint   Patient presents with    Back Pain     approx 7/28 upper thoracic pain radiates around under b/l breast  Worse w/ movement  Goes to PT  No injury         History of Present Illness       80year old female presents with complaints of thoracic back pain that radiates below bilateral breasts  Pt reports she had a fall in her bathroom on 06/18/2021, but not other injuries since  She states she was in the hospital for a bit after the fall, but they focused on her heart and lungs at the time and didn't really address her back  Pt has a history of osteoporosis and prior thoracic and lumbar fractures for which she sees a pain specialist  She has been going to PT and symptoms had improved, but came back on Wednesday  Pt denies chest pain and SOB as well as diaphoresis and fatigue  She has taken Dilauded with minimal improvement at this time  She has not noticed any changes in bowel or bladder function or gait  No other concerns or complaints today  Review of Systems   Review of Systems   Constitutional: Negative for chills and fever  Respiratory: Negative for cough and shortness of breath  Cardiovascular: Negative for chest pain and palpitations  Musculoskeletal: Positive for back pain  Negative for gait problem           Current Medications       Current Outpatient Medications:     amLODIPine (NORVASC) 2 5 mg tablet, Take 1 tablet (2 5 mg total) by mouth daily, Disp: 90 tablet, Rfl: 1    atenolol (TENORMIN) 50 mg tablet, Take 1 tablet (50 mg total) by mouth daily, Disp: 90 tablet, Rfl: 3    Cholecalciferol 2000 units CAPS, Take 1 capsule by mouth, Disp: , Rfl:     cycloSPORINE (RESTASIS) 0 05 % ophthalmic emulsion, Apply 1 drop to eye every 12 (twelve) hours , Disp: , Rfl:     diclofenac sodium (VOLTAREN) 1 %, Apply 2 g topically 4 (four) times a day, Disp: 6 Tube, Rfl: 3    HYDROmorphone (DILAUDID) 2 mg tablet, Take 2 mg by mouth 4 (four) times a day , Disp: , Rfl:     Jantoven 2 5 MG tablet, TAKE ONE TABLET BY MOUTH EVERY DAY or as directed, Disp: 30 tablet, Rfl: 5    Multiple Vitamins tablet, Take 1 tablet by mouth daily, Disp: , Rfl:     warfarin (COUMADIN) 2 5 mg tablet, Daily on Sunday, Tuesday, and thursdays, Disp: , Rfl: 0    warfarin (COUMADIN) 2 5 mg tablet, Daily on Monday, Wednesday, Friday, and saturday, Disp: , Rfl: 0    furosemide (LASIX) 20 mg tablet, Take 1 tablet (20 mg total) by mouth daily as needed (SOB) (Patient taking differently: Take 20 mg by mouth daily as needed (SOB) Patient states she takes 5 days a week), Disp: 90 tablet, Rfl: 3    Current Allergies     Allergies as of 08/02/2021 - Reviewed 08/02/2021   Allergen Reaction Noted    Iodine - food allergy Shortness Of Breath 07/23/2012    Acyclovir  08/17/2020    Buprenorphine GI Intolerance and Other (See Comments) 10/13/2016    Hydrocodone-acetaminophen  10/05/2015    Hydrocodone-acetaminophen  10/05/2015    Iodinated diagnostic agents  03/06/2013    Latex  08/17/2020    Metrizamide  03/06/2013    Naproxen GI Intolerance 01/06/2016    Shellfish allergy - food allergy  03/06/2013    Glucosamine Rash 07/23/2012    Medical tape Rash 04/19/2012    Other Other (See Comments) 07/23/2012    Shellfish-derived products - food allergy Rash 07/23/2012            The following portions of the patient's history were reviewed and updated as appropriate: allergies, current medications, past family history, past medical history, past social history, past surgical history and problem list      Past Medical History:   Diagnosis Date    Chronic pain     Hyperlipidemia     Hypertension     L1 vertebral fracture ([address] )     Osteoporosis     Paroxysmal A-fib ([address] )     TIA (transient ischemic attack)        Past Surgical History:   Procedure Laterality Date    BACK SURGERY         Family History   Problem Relation Age of Onset    Heart disease Mother          Medications have been verified  Objective   /90   Pulse 77   Temp 97 5 °F (36 4 °C)   Resp 18   Ht 5' (1 524 m)   Wt 55 3 kg (122 lb)   SpO2 99%   BMI 23 83 kg/m²   No LMP recorded  Patient is postmenopausal        Physical Exam     Physical Exam  Vitals and nursing note reviewed  Constitutional:       General: She is awake  She is not in acute distress  Appearance: Normal appearance  She is well-developed and well-groomed  She is not ill-appearing, toxic-appearing or diaphoretic  HENT:      Head: Normocephalic and atraumatic  Right Ear: Hearing and external ear normal       Left Ear: Hearing and external ear normal    Eyes:      General: Lids are normal  Vision grossly intact  Gaze aligned appropriately  Cardiovascular:      Rate and Rhythm: Normal rate  Pulmonary:      Effort: Pulmonary effort is normal       Comments: Patient is speaking in full sentences with no increased respiratory effort  No audible wheezing or stridor  Musculoskeletal:      Cervical back: Normal range of motion  Thoracic back: Deformity (kyphosis), tenderness (costochondral tenderness) and bony tenderness present  No swelling, edema, signs of trauma, lacerations or spasms  Decreased range of motion  Scoliosis present     Skin:     General: Skin is warm and dry  Neurological:      Mental Status: She is alert and oriented to person, place, and time  Coordination: Coordination is intact  Gait: Gait is intact  Psychiatric:         Attention and Perception: Attention and perception normal          Mood and Affect: Mood and affect normal          Speech: Speech normal          Behavior: Behavior normal  Behavior is cooperative  Note: Portions of this record may have been created with voice recognition software  Occasional wrong word or "sound a like" substitutions may have occurred due to the inherent limitations of voice recognition software  Please read the chart carefully and recognize, using context, where substitutions have occurred  *

## 2021-08-02 NOTE — TELEPHONE ENCOUNTER
Pt called back was actually at urgent care today is diagnosed with thoracic fracture which is the cause of her pain she had  She is trying to contact Dr William Simmons in reference about possible injection for pain and will contact us if coumadin hold needed    PRISCILLA

## 2021-08-02 NOTE — PATIENT INSTRUCTIONS
Continue hydromorphone as prescribed  Supplement with Tylenol as needed  Follow-up with pain specialist   Talk to PCP about Forteo injections  If symptoms worsen report to the emergency room  Vertebral Compression Fracture   AMBULATORY CARE:   A vertebral compression fracture (VCF)  is a collapse or breakdown in a bone in your spine  Compression fractures happen when there is too much pressure on the vertebra  VCFs most often occur in the thoracic (middle) and lumbar (lower) areas of your spine  Fractures may be mild to severe  Signs and symptoms: You may not have any signs or symptoms with a mild VCF  You may have any of the following with a more severe fracture:  · Sudden, severe, and sharp back pain    · Back pain that gets worse when you stand or walk    · Muscle spasms in your back    · Problems urinating or having bowel movements    · Sudden weakness in your arms or legs    Call your local emergency number (911 in the [address] [facility]) if:   · You feel lightheaded, short of breath, and have chest pain  · You cough up blood  · You suddenly cannot feel your legs  · You suddenly have trouble moving your arms or legs  Seek care immediately if:   · Your arm or leg feels warm, tender, and painful  It may look swollen and red  · You have new problems urinating or having bowel movements  · You have severe pain in your back after falling, bending forward, sneezing, or coughing strongly  Call your doctor or orthopedist if:   · You cannot sleep or rest because of back pain  · You have pain or swelling in your back that is getting worse, or does not go away  · You have questions or concerns about your condition or care  Treatment  may include any of the following, depending on how severe the fracture is:  · Bed rest  may be needed for a mild fracture  · A back brace  may be needed for 8 to 12 weeks   A brace may decrease your pain, and help your vertebrae heal     · A cane or walker  can help you keep your balance when you walk  This helps prevent a fall that could cause more injury  · Medicines  may be given for pain  Bisphosphonates and calcitonin are medicines to help your bones get stronger  They can decrease the pain of a VCF caused by osteoporosis, and decrease your risk for another fracture  · Physical or occupational therapy  may be recommended  A physical therapist teaches you exercises to help improve movement and strength, and to decrease pain  An occupational therapist teaches you skills to help with your daily activities  · Surgery  may be needed if your pain, weakness, or numbness does not go away with other treatment  Surgery may make your spine more stable, and help decrease pressure on your spinal nerves caused by the fracture  Heat and ice:   · Apply ice  on your back for 15 to 20 minutes every hour or as directed  Use an ice pack, or put crushed ice in a plastic bag  Cover it with a towel before you apply it  Ice helps prevent tissue damage and decreases swelling and pain  · Apply heat  on your back for 20 to 30 minutes every 2 hours for as many days as directed  Heat helps decrease pain and muscle spasms  Activity:   · Avoid activities that may make the pain worse, such as picking up heavy objects  When the pain decreases, begin normal, slow movements as directed by your healthcare provider  Your healthcare provider may have you do weight-bearing exercises such as walking  You may also do non-weight-bearing exercises such as swimming and bicycling  · You may need to use a walker or cane  Ask your healthcare provider for more information about how to use a cane or a walker  · When you  objects, bend at the hips and knees  Never bend from the waist only  Use bent knees and your leg muscles as you lift the object   While you lift the object, keep it close to your chest  Try not to twist or lift anything above your waist     Physical and occupational therapy:  Your healthcare provider may recommend you start or continue one or both types of therapy  A physical therapist teaches you exercises to help improve movement and strength, and to decrease pain  An occupational therapist teaches you skills to help with your daily activities  Manage pain during sleep:   · Do not sleep on a waterbed  Waterbeds do not provide good back support  · Sleep on a firm mattress  You may also put a ½ to 1-inch piece of plywood between the mattress and box spring  · Sleep on your back with a pillow under your knees  This will decrease pressure on your back  You may also sleep on your side with 1 or both of your knees bent and a pillow between them  It may also be helpful to sleep on your stomach with a pillow under you at waist level  Follow up with your doctor or orthopedist as directed: You may need to return for x-rays or other tests  Write down your questions so you remember to ask them during your visits  © Copyright [employer] 2021 Information is for End User's use only and may not be sold, redistributed or otherwise used for commercial purposes  All illustrations and images included in CareNotes® are the copyrighted property of A D A M [employer] Inc  or Will Rodriguez   The above information is an  only  It is not intended as medical advice for individual conditions or treatments  Talk to your doctor, nurse or pharmacist before following any medical regimen to see if it is safe and effective for you

## 2021-08-03 ENCOUNTER — OFFICE VISIT (OUTPATIENT)
Dept: OBGYN CLINIC | Facility: MEDICAL CENTER | Age: 86
End: 2021-08-03
Payer: MEDICARE

## 2021-08-03 VITALS
SYSTOLIC BLOOD PRESSURE: 122 MMHG | BODY MASS INDEX: 23.95 KG/M2 | WEIGHT: 122 LBS | DIASTOLIC BLOOD PRESSURE: 73 MMHG | HEIGHT: 60 IN | HEART RATE: 72 BPM

## 2021-08-03 DIAGNOSIS — S62.512A CLOSED FRACTURE OF BASE OF PROXIMAL PHALANX OF LEFT THUMB: Primary | ICD-10-CM

## 2021-08-03 PROCEDURE — 99213 OFFICE O/P EST LOW 20 MIN: CPT | Performed by: ORTHOPAEDIC SURGERY

## 2021-08-03 NOTE — PROGRESS NOTES
Chief Complaint     Left thumb fracture       History of Present Illness     Katya Dixon is a 80 y o  right hand dominant female who presents to the office today for a follow up regarding a left thumb fracture, DOI 6/18/2021  Overall Iise is doing well  She was compliant with the use of the thumb spica brace x 2 weeks  She has since d/c'd the use of the brace  Fariba repots minimal pain at times with overuse of her hand  Mitchel Peoples has bee working on thumb ROM, with improvement in motion  She is taking Morphine due to 8 broken bones in her back  She notes continued deformity to the thumb, which is not bothersome for her                 Past Medical History:   Diagnosis Date    Chronic pain     Hyperlipidemia     Hypertension     L1 vertebral fracture (HCC)     Osteoporosis     Paroxysmal A-fib (HCC)     TIA (transient ischemic attack)        Past Surgical History:   Procedure Laterality Date    BACK SURGERY         Allergies   Allergen Reactions    Iodine - Food Allergy Shortness Of Breath    Acyclovir     Buprenorphine GI Intolerance and Other (See Comments)    Hydrocodone-Acetaminophen      Vomiting    Hydrocodone-Acetaminophen      Vomiting  Vomiting    Iodinated Diagnostic Agents     Latex     Metrizamide     Naproxen GI Intolerance    Shellfish Allergy - Food Allergy     Glucosamine Rash    Medical Tape Rash    Other Other (See Comments)     red raised areas - please use paper tape    Shellfish-Derived Products - Food Allergy Rash       Current Outpatient Medications on File Prior to Visit   Medication Sig Dispense Refill    amLODIPine (NORVASC) 2 5 mg tablet Take 1 tablet (2 5 mg total) by mouth daily 90 tablet 1    atenolol (TENORMIN) 50 mg tablet Take 1 tablet (50 mg total) by mouth daily 90 tablet 3    Cholecalciferol 2000 units CAPS Take 1 capsule by mouth      cycloSPORINE (RESTASIS) 0 05 % ophthalmic emulsion Apply 1 drop to eye every 12 (twelve) hours       diclofenac sodium (VOLTAREN) 1 % Apply 2 g topically 4 (four) times a day 6 Tube 3    furosemide (LASIX) 20 mg tablet Take 1 tablet (20 mg total) by mouth daily as needed (SOB) (Patient taking differently: Take 20 mg by mouth daily as needed (SOB) Patient states she takes 5 days a week) 90 tablet 3    HYDROmorphone (DILAUDID) 2 mg tablet Take 2 mg by mouth 4 (four) times a day       Jantoven 2 5 MG tablet TAKE ONE TABLET BY MOUTH EVERY DAY or as directed 30 tablet 5    Multiple Vitamins tablet Take 1 tablet by mouth daily      warfarin (COUMADIN) 2 5 mg tablet Daily on Sunday, Tuesday, and thursdays  0    warfarin (COUMADIN) 2 5 mg tablet Daily on Monday, Wednesday, Friday, and saturday  0     No current facility-administered medications on file prior to visit  Social History     Tobacco Use    Smoking status: Former Smoker    Smokeless tobacco: Never Used   Vaping Use    Vaping Use: Never used   Substance Use Topics    Alcohol use: Never    Drug use: Never       Family History   Problem Relation Age of Onset    Heart disease Mother        Review of Systems     As stated in the HPI  All other systems were reviewed and are negative  Physical Exam     /73   Pulse 72   Ht 5' (1 524 m)   Wt 55 3 kg (122 lb)   BMI 23 83 kg/m²     GENERAL: This is a well-developed, well-nourished, age-appropriate patient in no acute distress  The patient is alert and oriented x3  Pleasant and cooperative  Eyes: Anicteric sclerae  Extraocular movements appear intact  HENT: Nares are patent with no drainage  Lungs: There is equal chest rise on inspection  Breathing is non-labored with no audible wheezing  Cardiovascular: No cyanosis  No upper extremity lymphadema  Skin: Skin is warm to touch  No obvious skin lesions or rashes other than described below  Neurologic: No ataxia  Psychiatric: Mood and affect are appropriate      Left thumb:   No erythema, ecchymosis or edema   Non tender to palpation   Z deformity noted Opposition base of the small finger   Brisk capillary refill     Data Review     Labs:  None    Electrodiagnostic Testing:  None    Imaging:  None    Assessment and Plan      Diagnoses and all orders for this visit:    Closed fracture of base of proximal phalanx of left thumb         80 y o  female with a left thumb proximal phalanx base fracture, DOI 6/18/21  Magnolia Ortiz is doing well  Deformity is not bothersome, minimal pain with great motion  Activities to tolerance, no restrictions       Follow Up: PRN     To Do Next Visit:     PROCEDURES PERFORMED:  Procedures  No Procedures performed today      Scribe Attestation    I,:  Rivas Cadet am acting as a scribe while in the presence of the attending physician :       I,:  Joi Monge MD personally performed the services described in this documentation    as scribed in my presence :

## 2021-08-04 ENCOUNTER — APPOINTMENT (OUTPATIENT)
Dept: LAB | Facility: HOSPITAL | Age: 86
End: 2021-08-04
Attending: INTERNAL MEDICINE
Payer: MEDICARE

## 2021-08-04 ENCOUNTER — ANTICOAG VISIT (OUTPATIENT)
Dept: CARDIOLOGY CLINIC | Facility: CLINIC | Age: 86
End: 2021-08-04

## 2021-08-04 DIAGNOSIS — I48.0 PAROXYSMAL ATRIAL FIBRILLATION (HCC): Primary | ICD-10-CM

## 2021-08-05 ENCOUNTER — TELEPHONE (OUTPATIENT)
Dept: LAB | Facility: HOSPITAL | Age: 86
End: 2021-08-05

## 2021-08-05 ENCOUNTER — TELEPHONE (OUTPATIENT)
Dept: CARDIOLOGY CLINIC | Facility: CLINIC | Age: 86
End: 2021-08-05

## 2021-08-05 NOTE — TELEPHONE ENCOUNTER
Pt called to say has fracture which she called about earlier this week stating her BP today has been jumping reading provided 121/64, 156/79 and 154/73   Told her they are not out of control and will report to Dr Nydia Patterson   she is in pain and Dr Nae Frye went on vacation and did't document his plan   she is angry with him   did tell her that pain can elevate BP s No symtoms with CP or SOB   Keirymarques Rm

## 2021-08-08 ENCOUNTER — APPOINTMENT (EMERGENCY)
Dept: CT IMAGING | Facility: HOSPITAL | Age: 86
DRG: 543 | End: 2021-08-08
Payer: MEDICARE

## 2021-08-08 ENCOUNTER — HOSPITAL ENCOUNTER (INPATIENT)
Facility: HOSPITAL | Age: 86
LOS: 2 days | Discharge: HOME | DRG: 543 | End: 2021-08-10
Attending: EMERGENCY MEDICINE | Admitting: STUDENT IN AN ORGANIZED HEALTH CARE EDUCATION/TRAINING PROGRAM
Payer: MEDICARE

## 2021-08-08 DIAGNOSIS — Z79.01 ANTICOAGULATION ADEQUATE: ICD-10-CM

## 2021-08-08 DIAGNOSIS — Z87.81 HISTORY OF SPINAL FRACTURE: ICD-10-CM

## 2021-08-08 DIAGNOSIS — G47.00 INSOMNIA: ICD-10-CM

## 2021-08-08 DIAGNOSIS — M54.9 INTRACTABLE BACK PAIN: ICD-10-CM

## 2021-08-08 DIAGNOSIS — M54.9 BACK PAIN: Primary | ICD-10-CM

## 2021-08-08 DIAGNOSIS — S22.20XA STERNAL FRACTURE: ICD-10-CM

## 2021-08-08 PROBLEM — R31.9 HEMATURIA: Status: ACTIVE | Noted: 2021-08-08

## 2021-08-08 LAB
ALBUMIN SERPL BCP-MCNC: 3.4 G/DL (ref 3.5–5)
ALP SERPL-CCNC: 87 U/L (ref 46–116)
ALT SERPL W P-5'-P-CCNC: 41 U/L (ref 12–78)
ANION GAP SERPL CALCULATED.3IONS-SCNC: 9 MMOL/L (ref 4–13)
AST SERPL W P-5'-P-CCNC: 67 U/L (ref 5–45)
BACTERIA UR QL AUTO: ABNORMAL /HPF
BASOPHILS # BLD AUTO: 0.02 THOUSANDS/ΜL (ref 0–0.1)
BASOPHILS NFR BLD AUTO: 0 % (ref 0–1)
BILIRUB SERPL-MCNC: 0.5 MG/DL (ref 0.2–1)
BILIRUB UR QL STRIP: NEGATIVE
BUN SERPL-MCNC: 18 MG/DL (ref 5–25)
CALCIUM ALBUM COR SERPL-MCNC: 9.7 MG/DL (ref 8.3–10.1)
CALCIUM SERPL-MCNC: 9.2 MG/DL (ref 8.3–10.1)
CHLORIDE SERPL-SCNC: 102 MMOL/L (ref 100–108)
CLARITY UR: CLEAR
CO2 SERPL-SCNC: 28 MMOL/L (ref 21–32)
COLOR UR: YELLOW
CREAT SERPL-MCNC: 0.87 MG/DL (ref 0.6–1.3)
EOSINOPHIL # BLD AUTO: 0.21 THOUSAND/ΜL (ref 0–0.61)
EOSINOPHIL NFR BLD AUTO: 3 % (ref 0–6)
ERYTHROCYTE [DISTWIDTH] IN BLOOD BY AUTOMATED COUNT: 13.9 % (ref 11.6–15.1)
GFR SERPL CREATININE-BSD FRML MDRD: 59 ML/MIN/1.73SQ M
GLUCOSE SERPL-MCNC: 104 MG/DL (ref 65–140)
GLUCOSE UR STRIP-MCNC: NEGATIVE MG/DL
HCT VFR BLD AUTO: 38.5 % (ref 34.8–46.1)
HGB BLD-MCNC: 12.5 G/DL (ref 11.5–15.4)
HGB UR QL STRIP.AUTO: ABNORMAL
IMM GRANULOCYTES # BLD AUTO: 0.02 THOUSAND/UL (ref 0–0.2)
IMM GRANULOCYTES NFR BLD AUTO: 0 % (ref 0–2)
INR PPP: 2.93 (ref 0.84–1.19)
KETONES UR STRIP-MCNC: NEGATIVE MG/DL
LEUKOCYTE ESTERASE UR QL STRIP: ABNORMAL
LIPASE SERPL-CCNC: 131 U/L (ref 73–393)
LYMPHOCYTES # BLD AUTO: 1.82 THOUSANDS/ΜL (ref 0.6–4.47)
LYMPHOCYTES NFR BLD AUTO: 27 % (ref 14–44)
MCH RBC QN AUTO: 31.6 PG (ref 26.8–34.3)
MCHC RBC AUTO-ENTMCNC: 32.5 G/DL (ref 31.4–37.4)
MCV RBC AUTO: 97 FL (ref 82–98)
MONOCYTES # BLD AUTO: 0.71 THOUSAND/ΜL (ref 0.17–1.22)
MONOCYTES NFR BLD AUTO: 11 % (ref 4–12)
NEUTROPHILS # BLD AUTO: 3.87 THOUSANDS/ΜL (ref 1.85–7.62)
NEUTS SEG NFR BLD AUTO: 59 % (ref 43–75)
NITRITE UR QL STRIP: NEGATIVE
NON-SQ EPI CELLS URNS QL MICRO: ABNORMAL /HPF
NRBC BLD AUTO-RTO: 0 /100 WBCS
PH UR STRIP.AUTO: 6.5 [PH] (ref 4.5–8)
PLATELET # BLD AUTO: 232 THOUSANDS/UL (ref 149–390)
PMV BLD AUTO: 9.3 FL (ref 8.9–12.7)
POTASSIUM SERPL-SCNC: 4.3 MMOL/L (ref 3.5–5.3)
PROT SERPL-MCNC: 8.1 G/DL (ref 6.4–8.2)
PROT UR STRIP-MCNC: ABNORMAL MG/DL
PROTHROMBIN TIME: 29.9 SECONDS (ref 11.6–14.5)
RBC # BLD AUTO: 3.96 MILLION/UL (ref 3.81–5.12)
RBC #/AREA URNS AUTO: ABNORMAL /HPF
SODIUM SERPL-SCNC: 139 MMOL/L (ref 136–145)
SP GR UR STRIP.AUTO: 1.02 (ref 1–1.03)
UROBILINOGEN UR QL STRIP.AUTO: 0.2 E.U./DL
WBC # BLD AUTO: 6.65 THOUSAND/UL (ref 4.31–10.16)
WBC #/AREA URNS AUTO: ABNORMAL /HPF

## 2021-08-08 PROCEDURE — 99285 EMERGENCY DEPT VISIT HI MDM: CPT | Performed by: EMERGENCY MEDICINE

## 2021-08-08 PROCEDURE — 81001 URINALYSIS AUTO W/SCOPE: CPT

## 2021-08-08 PROCEDURE — 99223 1ST HOSP IP/OBS HIGH 75: CPT | Performed by: STUDENT IN AN ORGANIZED HEALTH CARE EDUCATION/TRAINING PROGRAM

## 2021-08-08 PROCEDURE — 80053 COMPREHEN METABOLIC PANEL: CPT | Performed by: EMERGENCY MEDICINE

## 2021-08-08 PROCEDURE — 99285 EMERGENCY DEPT VISIT HI MDM: CPT

## 2021-08-08 PROCEDURE — 74176 CT ABD & PELVIS W/O CONTRAST: CPT

## 2021-08-08 PROCEDURE — 83690 ASSAY OF LIPASE: CPT | Performed by: EMERGENCY MEDICINE

## 2021-08-08 PROCEDURE — 93005 ELECTROCARDIOGRAM TRACING: CPT

## 2021-08-08 PROCEDURE — 85025 COMPLETE CBC W/AUTO DIFF WBC: CPT | Performed by: EMERGENCY MEDICINE

## 2021-08-08 PROCEDURE — 36415 COLL VENOUS BLD VENIPUNCTURE: CPT | Performed by: EMERGENCY MEDICINE

## 2021-08-08 PROCEDURE — 1123F ACP DISCUSS/DSCN MKR DOCD: CPT | Performed by: EMERGENCY MEDICINE

## 2021-08-08 PROCEDURE — 85610 PROTHROMBIN TIME: CPT | Performed by: EMERGENCY MEDICINE

## 2021-08-08 PROCEDURE — 96374 THER/PROPH/DIAG INJ IV PUSH: CPT

## 2021-08-08 PROCEDURE — 71250 CT THORAX DX C-: CPT

## 2021-08-08 RX ORDER — CYCLOSPORINE 0.5 MG/ML
1 EMULSION OPHTHALMIC EVERY 12 HOURS
Status: DISCONTINUED | OUTPATIENT
Start: 2021-08-08 | End: 2021-08-10 | Stop reason: HOSPADM

## 2021-08-08 RX ORDER — ZOLPIDEM TARTRATE 5 MG/1
2.5 TABLET ORAL
Status: DISCONTINUED | OUTPATIENT
Start: 2021-08-08 | End: 2021-08-10 | Stop reason: HOSPADM

## 2021-08-08 RX ORDER — FUROSEMIDE 20 MG/1
20 TABLET ORAL DAILY
Status: DISCONTINUED | OUTPATIENT
Start: 2021-08-09 | End: 2021-08-10 | Stop reason: HOSPADM

## 2021-08-08 RX ORDER — LIDOCAINE 50 MG/G
1 PATCH TOPICAL ONCE
Status: COMPLETED | OUTPATIENT
Start: 2021-08-08 | End: 2021-08-09

## 2021-08-08 RX ORDER — AMLODIPINE BESYLATE 2.5 MG/1
2.5 TABLET ORAL DAILY
Status: DISCONTINUED | OUTPATIENT
Start: 2021-08-09 | End: 2021-08-09

## 2021-08-08 RX ORDER — HYDROMORPHONE HCL IN WATER/PF 6 MG/30 ML
0.2 PATIENT CONTROLLED ANALGESIA SYRINGE INTRAVENOUS EVERY 6 HOURS PRN
Status: DISCONTINUED | OUTPATIENT
Start: 2021-08-08 | End: 2021-08-10 | Stop reason: HOSPADM

## 2021-08-08 RX ORDER — WARFARIN SODIUM 2.5 MG/1
1.25 TABLET ORAL
Status: DISCONTINUED | OUTPATIENT
Start: 2021-08-09 | End: 2021-08-10 | Stop reason: HOSPADM

## 2021-08-08 RX ORDER — SENNOSIDES 8.6 MG
1 TABLET ORAL
Status: DISCONTINUED | OUTPATIENT
Start: 2021-08-08 | End: 2021-08-10 | Stop reason: HOSPADM

## 2021-08-08 RX ORDER — WARFARIN SODIUM 2.5 MG/1
2.5 TABLET ORAL
Status: DISCONTINUED | OUTPATIENT
Start: 2021-08-08 | End: 2021-08-10 | Stop reason: HOSPADM

## 2021-08-08 RX ORDER — HYDROMORPHONE HCL/PF 1 MG/ML
0.5 SYRINGE (ML) INJECTION ONCE
Status: COMPLETED | OUTPATIENT
Start: 2021-08-08 | End: 2021-08-08

## 2021-08-08 RX ORDER — POLYETHYLENE GLYCOL 3350 17 G/17G
17 POWDER, FOR SOLUTION ORAL DAILY PRN
Status: DISCONTINUED | OUTPATIENT
Start: 2021-08-08 | End: 2021-08-10 | Stop reason: HOSPADM

## 2021-08-08 RX ORDER — HYDROMORPHONE HYDROCHLORIDE 2 MG/1
2 TABLET ORAL EVERY 6 HOURS PRN
Status: DISCONTINUED | OUTPATIENT
Start: 2021-08-08 | End: 2021-08-10 | Stop reason: HOSPADM

## 2021-08-08 RX ORDER — METHOCARBAMOL 500 MG/1
500 TABLET, FILM COATED ORAL ONCE
Status: COMPLETED | OUTPATIENT
Start: 2021-08-08 | End: 2021-08-08

## 2021-08-08 RX ORDER — ATENOLOL 50 MG/1
50 TABLET ORAL DAILY
Status: DISCONTINUED | OUTPATIENT
Start: 2021-08-09 | End: 2021-08-10 | Stop reason: HOSPADM

## 2021-08-08 RX ADMIN — WARFARIN SODIUM 2.5 MG: 2.5 TABLET ORAL at 22:06

## 2021-08-08 RX ADMIN — CYCLOSPORINE 1 DROP: 0.5 EMULSION OPHTHALMIC at 22:02

## 2021-08-08 RX ADMIN — HYDROMORPHONE HYDROCHLORIDE 2 MG: 2 TABLET ORAL at 18:23

## 2021-08-08 RX ADMIN — HYDROMORPHONE HYDROCHLORIDE 0.5 MG: 1 INJECTION, SOLUTION INTRAMUSCULAR; INTRAVENOUS; SUBCUTANEOUS at 12:44

## 2021-08-08 RX ADMIN — METHOCARBAMOL 500 MG: 500 TABLET ORAL at 12:44

## 2021-08-08 RX ADMIN — LIDOCAINE 1 PATCH: 50 PATCH CUTANEOUS at 12:45

## 2021-08-08 NOTE — ED PROVIDER NOTES
Pt Name: Nkechi Bates  MRN: [id number]  [place] 7/20/1932  Age/Sex: 80 y o  female  Date of evaluation: 8/8/2021  PCP: Jody Chou DO    CHIEF COMPLAINT    Chief Complaint   Patient presents with    Back Pain     Pt arrives EMS from [facility] reporting chronic back pain witha fracture to T8 that she was unable to get an epidural for due to her Dr  on vacation  Pt states pain/difficulty breathing due to the pain with intermittent chest pain  Also reports urinary incontinence   Chest Pain         HPI    Fariba presents to the Emergency Department complaining of severe back pain  She has had chest pain as well with any movement  She has not had any new falls or trauma  She takes chronic daily dilaudid and was hoping that her pain management doctor would be able to do injection to help with her pain  In the meanwhile, she lives alone and feels that she cannot manage with this much pain  HPI      Past Medical and Surgical History    Past Medical History:   Diagnosis Date    Chronic pain     Hyperlipidemia     Hypertension     L1 vertebral fracture (HCC)     Osteoporosis     Paroxysmal A-fib (HCC)     TIA (transient ischemic attack)        Past Surgical History:   Procedure Laterality Date    BACK SURGERY         Family History   Problem Relation Age of Onset    Heart disease Mother        Social History     Tobacco Use    Smoking status: Former Smoker    Smokeless tobacco: Never Used   Vaping Use    Vaping Use: Never used   Substance Use Topics    Alcohol use: Never    Drug use: Never               Allergies    Allergies   Allergen Reactions    Iodine - Food Allergy Shortness Of Breath    Acyclovir     Buprenorphine GI Intolerance and Other (See Comments)    Hydrocodone-Acetaminophen      Vomiting    Hydrocodone-Acetaminophen      Vomiting  Vomiting    Iodinated Diagnostic Agents     Latex     Metrizamide     Naproxen GI Intolerance    Shellfish Allergy - Food Allergy     Glucosamine Rash    Medical Tape Rash    Other Other (See Comments)     red raised areas - please use paper tape    Shellfish-Derived Products - Food Allergy Rash       Home Medications    Prior to Admission medications    Medication Sig Start Date End Date Taking? Authorizing Provider   amLODIPine (NORVASC) 2 5 mg tablet Take 1 tablet (2 5 mg total) by mouth daily 7/6/21  Yes Rafy Moreno MD   atenolol (TENORMIN) 50 mg tablet Take 1 tablet (50 mg total) by mouth daily 11/13/20  Yes Rafy Moreno MD   furosemide (LASIX) 20 mg tablet Take 1 tablet (20 mg total) by mouth daily as needed (SOB)  Patient taking differently: Take 20 mg by mouth daily as needed (SOB) Patient states she takes 5 days a week 7/13/20  Yes Rafy Moreno MD   HYDROmorphone (DILAUDID) 2 mg tablet Take 2 mg by mouth 4 (four) times a day  7/10/17  Yes Historical Provider, MD   Jantoven 2 5 MG tablet TAKE ONE TABLET BY MOUTH EVERY DAY or as directed 7/6/21  Yes Rafy Moreno MD   Multiple Vitamins tablet Take 1 tablet by mouth daily 4/21/15  Yes Historical Provider, MD   warfarin (COUMADIN) 2 5 mg tablet Daily on Sunday, Tuesday, and thursdays 6/22/21  Yes Ryanne Sprague DO   warfarin (COUMADIN) 2 5 mg tablet Daily on Monday, Wednesday, Friday, and saturday 6/21/21  Yes Ryanne Sprague DO   Cholecalciferol 2000 units CAPS Take 1 capsule by mouth  Patient not taking: Reported on 8/8/2021    Historical Provider, MD   cycloSPORINE (RESTASIS) 0 05 % ophthalmic emulsion Apply 1 drop to eye every 12 (twelve) hours   Patient not taking: Reported on 8/8/2021    Historical Provider, MD   diclofenac sodium (VOLTAREN) 1 % Apply 2 g topically 4 (four) times a day  Patient not taking: Reported on 8/8/2021 6/13/20   Mani Cooper MD           Review of Systems    Review of Systems   Constitutional: Negative for chills and fever  HENT: Negative for ear pain and sore throat  Eyes: Negative for pain and visual disturbance     Respiratory: Negative for cough and shortness of breath  Cardiovascular: Negative for chest pain and palpitations  Gastrointestinal: Negative for abdominal pain and vomiting  Genitourinary: Negative for dysuria and hematuria  Musculoskeletal: Positive for back pain  Negative for arthralgias  Skin: Negative for color change and rash  Neurological: Negative for seizures and syncope  All other systems reviewed and are negative  Physical Exam      ED Triage Vitals   Temperature Pulse Respirations Blood Pressure SpO2   08/08/21 1149 08/08/21 1149 08/08/21 1149 08/08/21 1149 08/08/21 1149   98 7 °F (37 1 °C) 86 16 127/69 94 %      Temp Source Heart Rate Source Patient Position - Orthostatic VS BP Location FiO2 (%)   08/08/21 1149 08/08/21 1149 08/08/21 1149 08/08/21 1149 --   Oral Monitor Sitting Right arm       Pain Score       08/08/21 1244       7               Physical Exam  Vitals and nursing note reviewed  Constitutional:       General: She is not in acute distress  Appearance: She is well-developed  HENT:      Head: Normocephalic and atraumatic  Eyes:      Conjunctiva/sclera: Conjunctivae normal    Cardiovascular:      Rate and Rhythm: Normal rate and regular rhythm  Heart sounds: No murmur heard  Pulmonary:      Effort: Pulmonary effort is normal  No respiratory distress  Breath sounds: Normal breath sounds  Abdominal:      Palpations: Abdomen is soft  Tenderness: There is no abdominal tenderness  Musculoskeletal:      Cervical back: Neck supple  Back:    Skin:     General: Skin is warm and dry  Neurological:      Mental Status: She is alert  Assessment and Plan    Randy Finnegan is a 80 y o  female who presents with severe pain  Physical examination remarkable for pain with any movement  Differential diagnosis (not completely inclusive) includes acute on chronic pain  Plan will be to perform diagnostic testing and treat symptomatically        MDM    Diagnostic Results      Labs:    Results for orders placed or performed during the hospital encounter of 08/08/21   CBC and differential   Result Value Ref Range    WBC 6 65 4 31 - 10 16 Thousand/uL    RBC 3 96 3 81 - 5 12 Million/uL    Hemoglobin 12 5 11 5 - 15 4 g/dL    Hematocrit 38 5 34 8 - 46 1 %    MCV 97 82 - 98 fL    MCH 31 6 26 8 - 34 3 pg    MCHC 32 5 31 4 - 37 4 g/dL    RDW 13 9 11 6 - 15 1 %    MPV 9 3 8 9 - 12 7 fL    Platelets 054 848 - 259 Thousands/uL    nRBC 0 /100 WBCs    Neutrophils Relative 59 43 - 75 %    Immat GRANS % 0 0 - 2 %    Lymphocytes Relative 27 14 - 44 %    Monocytes Relative 11 4 - 12 %    Eosinophils Relative 3 0 - 6 %    Basophils Relative 0 0 - 1 %    Neutrophils Absolute 3 87 1 85 - 7 62 Thousands/µL    Immature Grans Absolute 0 02 0 00 - 0 20 Thousand/uL    Lymphocytes Absolute 1 82 0 60 - 4 47 Thousands/µL    Monocytes Absolute 0 71 0 17 - 1 22 Thousand/µL    Eosinophils Absolute 0 21 0 00 - 0 61 Thousand/µL    Basophils Absolute 0 02 0 00 - 0 10 Thousands/µL   Comprehensive metabolic panel   Result Value Ref Range    Sodium 139 136 - 145 mmol/L    Potassium 4 3 3 5 - 5 3 mmol/L    Chloride 102 100 - 108 mmol/L    CO2 28 21 - 32 mmol/L    ANION GAP 9 4 - 13 mmol/L    BUN 18 5 - 25 mg/dL    Creatinine 0 87 0 60 - 1 30 mg/dL    Glucose 104 65 - 140 mg/dL    Calcium 9 2 8 3 - 10 1 mg/dL    Corrected Calcium 9 7 8 3 - 10 1 mg/dL    AST 67 (H) 5 - 45 U/L    ALT 41 12 - 78 U/L    Alkaline Phosphatase 87 46 - 116 U/L    Total Protein 8 1 6 4 - 8 2 g/dL    Albumin 3 4 (L) 3 5 - 5 0 g/dL    Total Bilirubin 0 50 0 20 - 1 00 mg/dL    eGFR 59 ml/min/1 73sq m   Lipase   Result Value Ref Range    Lipase 131 73 - 393 u/L   Protime-INR   Result Value Ref Range    Protime 29 9 (H) 11 6 - 14 5 seconds    INR 2 93 (H) 0 84 - 1 19   Urine Microscopic   Result Value Ref Range    RBC, UA 4-10 (A) None Seen, 2-4 /hpf    WBC, UA 4-10 (A) None Seen, 2-4, 5-60 /hpf    Epithelial Cells Occasional None Seen, Occasional /hpf    Bacteria, UA Occasional None Seen, Occasional /hpf   Urine Macroscopic, POC   Result Value Ref Range    Color, UA Yellow     Clarity, UA Clear     pH, UA 6 5 4 5 - 8 0    Leukocytes, UA Small (A) Negative    Nitrite, UA Negative Negative    Protein,  (2+) (A) Negative mg/dl    Glucose, UA Negative Negative mg/dl    Ketones, UA Negative Negative mg/dl    Urobilinogen, UA 0 2 0 2, 1 0 E U /dl E U /dl    Bilirubin, UA Negative Negative    Blood, UA Trace (A) Negative    Specific Gravity, UA 1 025 1 003 - 1 030       All labs reviewed and utilized in the medical decision making process    Radiology:    CT chest abdomen pelvis wo contrast   Final Result      1  Normal caliber thoracic aorta with no evidence of aneurysm  Aortic dissection cannot be diagnosed without intravenous contrast       2   Areas of subsegmental atelectasis in both lungs  3   Otherwise, no evidence of acute abnormality in the chest, abdomen or pelvis  4   Chronic compression fractures at several thoracic and lumbar vertebral levels, unchanged since thoracic spine radiographs from 8/2/2021 and lumbar spine radiographs from 3/9/2021       5   Acute appearing fracture of the manubrium of the sternum  Workstation performed: [id number]         CT recon only thoracolumbar   Final Result       Multiple compression fractures in the thoracic and lumbar spine, as described above  Most are chronic  However, severe compression fractures of T8 and T10 have developed since the CT from 11/30/2018  These were previously demonstrated on thoracic    spine radiographs from 8/2/2021  Workstation performed: [id number]             All radiology studies independently viewed by me and interpreted by the radiologist     Procedure    Procedures      ED Course of Care and Re-Assessments    Patient tells me that she has had sternal pain since her fall in June at the time of her last admission     The

## 2021-08-08 NOTE — PLAN OF CARE
Problem: Potential for Falls  Goal: Patient will remain free of falls  Description: INTERVENTIONS:  - Educate patient/family on patient safety including physical limitations  - Instruct patient to call for assistance with activity   - Consult OT/PT to assist with strengthening/mobility   - Keep Call bell within reach  - Keep bed low and locked with side rails adjusted as appropriate  - Keep care items and personal belongings within reach  - Initiate and maintain comfort rounds  - Make Fall Risk Sign visible to staff    - Apply yellow socks and bracelet for high fall risk patients  - Consider moving patient to room near nurses station  Outcome: Progressing     Problem: MOBILITY - ADULT  Goal: Maintain or return to baseline ADL function  Description: INTERVENTIONS:  -  Assess patient's ability to carry out ADLs; assess patient's baseline for ADL function and identify physical deficits which impact ability to perform ADLs (bathing, care of mouth/teeth, toileting, grooming, dressing, etc )  - Assess/evaluate cause of self-care deficits   - Assess range of motion  - Assess patient's mobility; develop plan if impaired  - Assess patient's need for assistive devices and provide as appropriate  - Encourage maximum independence but intervene and supervise when necessary  - Involve family in performance of ADLs  - Assess for home care needs following discharge   - Consider OT consult to assist with ADL evaluation and planning for discharge  - Provide patient education as appropriate  Outcome: Progressing  Goal: Maintains/Returns to pre admission functional level  Description: INTERVENTIONS:  - Perform BMAT or MOVE assessment daily    - Set and communicate daily mobility goal to care team and patient/family/caregiver     - Collaborate with rehabilitation services on mobility goals if consulted    - Out of bed for toileting  - Record patient progress and toleration of activity level   Outcome: Progressing     Problem: PAIN - ADULT  Goal: Verbalizes/displays adequate comfort level or baseline comfort level  Description: Interventions:  - Encourage patient to monitor pain and request assistance  - Assess pain using appropriate pain scale  - Administer analgesics based on type and severity of pain and evaluate response  - Implement non-pharmacological measures as appropriate and evaluate response  - Consider cultural and social influences on pain and pain management  - Notify physician/advanced practitioner if interventions unsuccessful or patient reports new pain  Outcome: Progressing     Problem: INFECTION - ADULT  Goal: Absence or prevention of progression during hospitalization  Description: INTERVENTIONS:  - Assess and monitor for signs and symptoms of infection  - Monitor lab/diagnostic results  - Monitor all insertion sites, i e  indwelling lines, tubes, and drains  - Monitor endotracheal if appropriate and nasal secretions for changes in amount and color  - [place] appropriate cooling/warming therapies per order  - Administer medications as ordered  - Instruct and encourage patient and family to use good hand hygiene technique  - Identify and instruct in appropriate isolation precautions for identified infection/condition  Outcome: Progressing  Goal: Absence of fever/infection during neutropenic period  Description: INTERVENTIONS:  - Monitor WBC    Outcome: Progressing     Problem: DISCHARGE PLANNING  Goal: Discharge to home or other facility with appropriate resources  Description: INTERVENTIONS:  - Identify barriers to discharge w/patient and caregiver  - Arrange for needed discharge resources and transportation as appropriate  - Identify discharge learning needs (meds, wound care, etc )  - Arrange for interpretive services to assist at discharge as needed  - Refer to Case Management Department for coordinating discharge planning if the patient needs post-hospital services based on physician/advanced practitioner order or complex needs related to functional status, cognitive ability, or social support system  Outcome: Progressing     Problem: Knowledge Deficit  Goal: Patient/family/caregiver demonstrates understanding of disease process, treatment plan, medications, and discharge instructions  Description: Complete learning assessment and assess knowledge base    Interventions:  - Provide teaching at level of understanding  - Provide teaching via preferred learning methods  Outcome: Progressing

## 2021-08-08 NOTE — ASSESSMENT & PLAN NOTE
Microscopic hematuria in the setting of coumadin used   Repeat urinalysis / PCP follow-up on discharge

## 2021-08-08 NOTE — ASSESSMENT & PLAN NOTE
Rate controlled with atenolol and anticoagulated with warfarin  MWF: 1 25mg HS  Rest of the week: 2 5mg hs      Recent Labs     08/08/21  1245   INR 2 93*

## 2021-08-08 NOTE — ASSESSMENT & PLAN NOTE
Imaging studies reviewed: Chronic compression fractures of T6, T8, T10, T11, T12, L1, L4 and L5, unchanged since radiographs from 3/9/2021 and 8/2/2021  Prior vertebroplasty is at T12, L4 and L5  Chronic, healed fracture of the body of the sternum  Fracture of the sternal manubrium, acute in appearance  No other evidence of acute fracture    No evidence of osteolytic or osteoblastic lesion

## 2021-08-08 NOTE — H&P
[address] [place] [address] 7/20/1932, 80 y o  female MRN: [id number]  Unit/Bed#: ED 20 Encounter: [id number]  Primary Care Provider: Eboni Pitts DO   Date and time admitted to hospital: 8/8/2021 11:37 AM    * Intractable back pain  Assessment & Plan  80year old female presenting with intractable back pain possibly as a result of her chronic compression fractures  She was also found to have an acute appearing fracture of the manubrium of the sternum   - Pain Control  Takes dilaudid po 2mg QID  Will add IV for breakthrough  - Ortho consult  - supportive care, pt/ot eval  - bowel regimen    Benign essential hypertension  Assessment & Plan  Controlled  - Continue amlodipine and atenolol    Paroxysmal atrial fibrillation (HCC)  Assessment & Plan  Rate controlled with atenolol and anticoagulated with warfarin  MWF: 1 25mg HS  Rest of the week: 2 5mg hs  Recent Labs     08/08/21  1245   INR 2 93*         Hematuria  Assessment & Plan  Microscopic hematuria in the setting of coumadin used  Repeat urinalysis / PCP follow-up on discharge    Chronic diastolic (congestive) heart failure (HCC)  Assessment & Plan  Wt Readings from Last 3 Encounters:   08/08/21 56 7 kg (125 lb)   08/03/21 55 3 kg (122 lb)   08/02/21 55 3 kg (122 lb)     Euvolemic, not in acute exacerbation   - Takes lasix 20mg PRN for shortness of breath or weight gain  Will put to daily for now due to above baseline weight levels  Uncomplicated opioid dependence ([facility] [address] )  Assessment & Plan  PDMP Reviewed, takes dilaudid due to her chronic compression fractures    Closed compression fracture of L1 lumbar vertebra  Assessment & Plan  Imaging studies reviewed: Chronic compression fractures of T6, T8, T10, T11, T12, L1, L4 and L5, unchanged since radiographs from 3/9/2021 and 8/2/2021  Prior vertebroplasty is at T12, L4 and L5  Chronic, healed fracture of the body of the sternum   Fracture of the sternal manubrium, acute in appearance  No other evidence of acute fracture  No evidence of osteolytic or osteoblastic lesion        VTE Prophylaxis: Warfarin (Coumadin)  / sequential compression device   Code Status: dnr/dni    Anticipated Length of Stay:  Patient will be admitted on an Emergency basis with an anticipated length of stay of  > 2 midnights  Justification for Hospital Stay: intractable pain requiring iv medications, pt/ot eval    Total Time for Visit, including Counseling / Coordination of Care: 75 minutes  Greater than 50% of this total time spent on direct patient counseling and coordination of care  Chief Complaint:   Back pain    History of Present Illness:    Solitario Hylton is a 80 y o  female who presents with back pain  She was recently admitted as a result of a thumb fracture after sustaining it from a fall  She was sent home with VNA services  She is being seen at the pain clinic as a result of her chronic compression fractures  States that she has been on able to sleep well over the past couple of nights as a result of this back pain  States that it could go up to 9/10 in severity and was sharp in character  States that occasionally she will have some shortness of breath as a result of this pain, which fortunately is not present on my initial evaluation  She denies any fever or chills  Denies any bowel incontinence or numbness  Persistence of pain prompted her to seek consult in our institution since she states that she could not do and under night of severe pain  She is currently being followed at the pain clinic and receives Dilaudid 2 mg p o  Q i d  P r n       Review of Systems:    Review of Systems   Constitutional: Negative for chills, fatigue and fever  HENT: Negative for ear pain  Eyes: Negative for pain  Respiratory: Positive for shortness of breath  Negative for cough and wheezing  Cardiovascular: Positive for chest pain  Negative for palpitations     Gastrointestinal: Negative for abdominal pain, diarrhea, nausea and vomiting  Genitourinary: Negative for dysuria  Musculoskeletal: Positive for back pain and gait problem  Skin: Negative for color change and pallor  Neurological: Negative for seizures and headaches  Psychiatric/Behavioral: Negative for agitation and confusion  Past Medical and Surgical History:     Past Medical History:   Diagnosis Date    Chronic pain     Hyperlipidemia     Hypertension     L1 vertebral fracture (HCC)     Osteoporosis     Paroxysmal A-fib (HCC)     TIA (transient ischemic attack)        Past Surgical History:   Procedure Laterality Date    BACK SURGERY         Meds/Allergies:    Prior to Admission medications    Medication Sig Start Date End Date Taking?  Authorizing Provider   amLODIPine (NORVASC) 2 5 mg tablet Take 1 tablet (2 5 mg total) by mouth daily 7/6/21  Yes Keshia Lambert MD   atenolol (TENORMIN) 50 mg tablet Take 1 tablet (50 mg total) by mouth daily 11/13/20  Yes Keshia Lambert MD   furosemide (LASIX) 20 mg tablet Take 1 tablet (20 mg total) by mouth daily as needed (SOB)  Patient taking differently: Take 20 mg by mouth daily as needed (SOB) Patient states she takes 5 days a week 7/13/20  Yes Keshia Lambert MD   HYDROmorphone (DILAUDID) 2 mg tablet Take 2 mg by mouth 4 (four) times a day  7/10/17  Yes Historical Provider, MD Thom[place]oven 2 5 MG tablet TAKE ONE TABLET BY MOUTH EVERY DAY or as directed 7/6/21  Yes Keshia Lambert MD   Multiple Vitamins tablet Take 1 tablet by mouth daily 4/21/15  Yes Historical Provider, MD   warfarin (COUMADIN) 2 5 mg tablet Daily on Sunday, Tuesday, and thursdays 6/22/21  Yes Ryanne Sprague DO   warfarin (COUMADIN) 2 5 mg tablet Daily on Monday, Wednesday, Friday, and saturday 6/21/21  Yes Ryanne Sprague DO   Cholecalciferol 2000 units CAPS Take 1 capsule by mouth  Patient not taking: Reported on 8/8/2021    Historical Provider, MD   cycloSPORINE (RESTASIS) 0 05 % ophthalmic emulsion Apply 1 drop to eye every 12 (twelve) hours   Patient not taking: Reported on 8/8/2021    Historical Provider, MD   diclofenac sodium (VOLTAREN) 1 % Apply 2 g topically 4 (four) times a day  Patient not taking: Reported on 8/8/2021 6/13/20   Sharath Booth MD     I have reviewed home medications with patient personally  Allergies: Allergies   Allergen Reactions    Iodine - Food Allergy Shortness Of Breath    Acyclovir     Buprenorphine GI Intolerance and Other (See Comments)    Hydrocodone-Acetaminophen      Vomiting    Hydrocodone-Acetaminophen      Vomiting  Vomiting    Iodinated Diagnostic Agents     Latex     Metrizamide     Naproxen GI Intolerance    Shellfish Allergy - Food Allergy     Glucosamine Rash    Medical Tape Rash    Other Other (See Comments)     red raised areas - please use paper tape    Shellfish-Derived Products - Food Allergy Rash       Social History:     Marital Status: Single   Substance Use History:   Social History     Substance and Sexual Activity   Alcohol Use Never     Social History     Tobacco Use   Smoking Status Former Smoker   Smokeless Tobacco Never Used     Social History     Substance and Sexual Activity   Drug Use Never       Family History:    Family History   Problem Relation Age of Onset    Heart disease Mother        Physical Exam:     Vitals:   Blood Pressure: 105/61 (08/08/21 1452)  Pulse: 76 (08/08/21 1452)  Temperature: 98 7 °F (37 1 °C) (08/08/21 1149)  Temp Source: Oral (08/08/21 1149)  Respirations: 16 (08/08/21 1452)  Weight - Scale: 56 7 kg (125 lb) (08/08/21 1149)  SpO2: 95 % (08/08/21 1452)    Physical Exam  Vitals reviewed  Constitutional:       General: She is not in acute distress  HENT:      Head: Normocephalic  Nose: Nose normal       Mouth/Throat:      Mouth: Mucous membranes are moist    Eyes:      General: No scleral icterus  Cardiovascular:      Rate and Rhythm: Normal rate  Rhythm irregular     Pulmonary:      Effort: Pulmonary effort is normal  No respiratory distress  Breath sounds: No wheezing or rales  Abdominal:      General: There is no distension  Palpations: Abdomen is soft  Tenderness: There is no abdominal tenderness  Musculoskeletal:      Right lower leg: No edema  Left lower leg: No edema  Comments: No spinal tenderness   Skin:     General: Skin is warm  Neurological:      Mental Status: She is alert and oriented to person, place, and time  Sensory: No sensory deficit  Motor: Weakness (cannot properly assess due to limitation as a result of pain) present  Psychiatric:         Mood and Affect: Mood normal          Behavior: Behavior normal        Additional Data:     Lab Results: I have personally reviewed pertinent reports  Results from last 7 days   Lab Units 08/08/21  1245   WBC Thousand/uL 6 65   HEMOGLOBIN g/dL 12 5   HEMATOCRIT % 38 5   PLATELETS Thousands/uL 232   NEUTROS PCT % 59   LYMPHS PCT % 27   MONOS PCT % 11   EOS PCT % 3     Results from last 7 days   Lab Units 08/08/21  1245   SODIUM mmol/L 139   POTASSIUM mmol/L 4 3   CHLORIDE mmol/L 102   CO2 mmol/L 28   BUN mg/dL 18   CREATININE mg/dL 0 87   ANION GAP mmol/L 9   CALCIUM mg/dL 9 2   ALBUMIN g/dL 3 4*   TOTAL BILIRUBIN mg/dL 0 50   ALK PHOS U/L 87   ALT U/L 41   AST U/L 67*   GLUCOSE RANDOM mg/dL 104     Results from last 7 days   Lab Units 08/08/21  1245   INR  2 93*                   Imaging: I have personally reviewed pertinent reports  CT chest abdomen pelvis wo contrast   Final Result by Jaqueline Laird MD (08/08 2849)      1  Normal caliber thoracic aorta with no evidence of aneurysm  Aortic dissection cannot be diagnosed without intravenous contrast       2   Areas of subsegmental atelectasis in both lungs  3   Otherwise, no evidence of acute abnormality in the chest, abdomen or pelvis        4   Chronic compression fractures at several thoracic and lumbar vertebral levels, unchanged since thoracic spine radiographs from 8/2/2021 and lumbar spine radiographs from 3/9/2021       5   Acute appearing fracture of the manubrium of the sternum  Workstation performed: [id number]         CT recon only thoracolumbar   Final Result by Alan Sanchez MD (08/08 1554)       Multiple compression fractures in the thoracic and lumbar spine, as described above  Most are chronic  However, severe compression fractures of T8 and T10 have developed since the CT from 11/30/2018  These were previously demonstrated on thoracic    spine radiographs from 8/2/2021  Workstation performed: [id number]             EKG, Pathology, and Other Studies Reviewed on Admission:   · EKG: nsr at 77 bpm with TWI III, AVF, no st elevations    Allscripts / Epic Records Reviewed: Yes     ** Please Note: This note has been constructed using a voice recognition system   **

## 2021-08-08 NOTE — ASSESSMENT & PLAN NOTE
80year old female presenting with intractable back pain possibly as a result of her chronic compression fractures  She was also found to have an acute appearing fracture of the manubrium of the sternum   - Pain Control  Takes dilaudid po 2mg QID   Will add IV for breakthrough  - Ortho consult  - supportive care, pt/ot eval  - bowel regimen

## 2021-08-08 NOTE — ASSESSMENT & PLAN NOTE
Wt Readings from Last 3 Encounters:   08/08/21 56 7 kg (125 lb)   08/03/21 55 3 kg (122 lb)   08/02/21 55 3 kg (122 lb)     Euvolemic, not in acute exacerbation   - Takes lasix 20mg PRN for shortness of breath or weight gain  Will put to daily for now due to above baseline weight levels

## 2021-08-09 LAB
ALBUMIN SERPL BCP-MCNC: 2.8 G/DL (ref 3.5–5)
ALP SERPL-CCNC: 66 U/L (ref 46–116)
ALT SERPL W P-5'-P-CCNC: 33 U/L (ref 12–78)
ANION GAP SERPL CALCULATED.3IONS-SCNC: 8 MMOL/L (ref 4–13)
AST SERPL W P-5'-P-CCNC: 42 U/L (ref 5–45)
ATRIAL RATE: 77 BPM
BASOPHILS # BLD AUTO: 0.03 THOUSANDS/ΜL (ref 0–0.1)
BASOPHILS NFR BLD AUTO: 1 % (ref 0–1)
BILIRUB SERPL-MCNC: 0.49 MG/DL (ref 0.2–1)
BUN SERPL-MCNC: 15 MG/DL (ref 5–25)
CALCIUM ALBUM COR SERPL-MCNC: 9.7 MG/DL (ref 8.3–10.1)
CALCIUM SERPL-MCNC: 8.7 MG/DL (ref 8.3–10.1)
CHLORIDE SERPL-SCNC: 106 MMOL/L (ref 100–108)
CO2 SERPL-SCNC: 28 MMOL/L (ref 21–32)
CREAT SERPL-MCNC: 0.82 MG/DL (ref 0.6–1.3)
EOSINOPHIL # BLD AUTO: 0.36 THOUSAND/ΜL (ref 0–0.61)
EOSINOPHIL NFR BLD AUTO: 6 % (ref 0–6)
ERYTHROCYTE [DISTWIDTH] IN BLOOD BY AUTOMATED COUNT: 13.9 % (ref 11.6–15.1)
GFR SERPL CREATININE-BSD FRML MDRD: 64 ML/MIN/1.73SQ M
GLUCOSE SERPL-MCNC: 94 MG/DL (ref 65–140)
HCT VFR BLD AUTO: 34.9 % (ref 34.8–46.1)
HGB BLD-MCNC: 11.4 G/DL (ref 11.5–15.4)
IMM GRANULOCYTES # BLD AUTO: 0.02 THOUSAND/UL (ref 0–0.2)
IMM GRANULOCYTES NFR BLD AUTO: 0 % (ref 0–2)
INR PPP: 3.06 (ref 0.84–1.19)
LYMPHOCYTES # BLD AUTO: 2.53 THOUSANDS/ΜL (ref 0.6–4.47)
LYMPHOCYTES NFR BLD AUTO: 39 % (ref 14–44)
MAGNESIUM SERPL-MCNC: 1.9 MG/DL (ref 1.6–2.6)
MCH RBC QN AUTO: 31.6 PG (ref 26.8–34.3)
MCHC RBC AUTO-ENTMCNC: 32.7 G/DL (ref 31.4–37.4)
MCV RBC AUTO: 97 FL (ref 82–98)
MONOCYTES # BLD AUTO: 0.69 THOUSAND/ΜL (ref 0.17–1.22)
MONOCYTES NFR BLD AUTO: 11 % (ref 4–12)
NEUTROPHILS # BLD AUTO: 2.9 THOUSANDS/ΜL (ref 1.85–7.62)
NEUTS SEG NFR BLD AUTO: 43 % (ref 43–75)
NRBC BLD AUTO-RTO: 0 /100 WBCS
P AXIS: 24 DEGREES
PHOSPHATE SERPL-MCNC: 3.6 MG/DL (ref 2.3–4.1)
PLATELET # BLD AUTO: 198 THOUSANDS/UL (ref 149–390)
PMV BLD AUTO: 9.2 FL (ref 8.9–12.7)
POTASSIUM SERPL-SCNC: 4.2 MMOL/L (ref 3.5–5.3)
PR INTERVAL: 162 MS
PROT SERPL-MCNC: 6.8 G/DL (ref 6.4–8.2)
PROTHROMBIN TIME: 30.9 SECONDS (ref 11.6–14.5)
QRS AXIS: -10 DEGREES
QRSD INTERVAL: 82 MS
QT INTERVAL: 376 MS
QTC INTERVAL: 425 MS
RBC # BLD AUTO: 3.61 MILLION/UL (ref 3.81–5.12)
SODIUM SERPL-SCNC: 142 MMOL/L (ref 136–145)
T WAVE AXIS: -8 DEGREES
VENTRICULAR RATE: 77 BPM
WBC # BLD AUTO: 6.53 THOUSAND/UL (ref 4.31–10.16)

## 2021-08-09 PROCEDURE — 93010 ELECTROCARDIOGRAM REPORT: CPT | Performed by: INTERNAL MEDICINE

## 2021-08-09 PROCEDURE — 83735 ASSAY OF MAGNESIUM: CPT | Performed by: STUDENT IN AN ORGANIZED HEALTH CARE EDUCATION/TRAINING PROGRAM

## 2021-08-09 PROCEDURE — 84100 ASSAY OF PHOSPHORUS: CPT | Performed by: STUDENT IN AN ORGANIZED HEALTH CARE EDUCATION/TRAINING PROGRAM

## 2021-08-09 PROCEDURE — 80053 COMPREHEN METABOLIC PANEL: CPT | Performed by: STUDENT IN AN ORGANIZED HEALTH CARE EDUCATION/TRAINING PROGRAM

## 2021-08-09 PROCEDURE — 99232 SBSQ HOSP IP/OBS MODERATE 35: CPT | Performed by: INTERNAL MEDICINE

## 2021-08-09 PROCEDURE — 85610 PROTHROMBIN TIME: CPT | Performed by: STUDENT IN AN ORGANIZED HEALTH CARE EDUCATION/TRAINING PROGRAM

## 2021-08-09 PROCEDURE — 99221 1ST HOSP IP/OBS SF/LOW 40: CPT | Performed by: ORTHOPAEDIC SURGERY

## 2021-08-09 PROCEDURE — 85025 COMPLETE CBC W/AUTO DIFF WBC: CPT | Performed by: STUDENT IN AN ORGANIZED HEALTH CARE EDUCATION/TRAINING PROGRAM

## 2021-08-09 RX ORDER — AMLODIPINE BESYLATE 2.5 MG/1
2.5 TABLET ORAL DAILY
Status: DISCONTINUED | OUTPATIENT
Start: 2021-08-09 | End: 2021-08-10 | Stop reason: HOSPADM

## 2021-08-09 RX ORDER — ONDANSETRON 2 MG/ML
4 INJECTION INTRAMUSCULAR; INTRAVENOUS EVERY 6 HOURS PRN
Status: DISCONTINUED | OUTPATIENT
Start: 2021-08-09 | End: 2021-08-10 | Stop reason: HOSPADM

## 2021-08-09 RX ADMIN — WARFARIN SODIUM 1.25 MG: 2.5 TABLET ORAL at 17:09

## 2021-08-09 RX ADMIN — HYDROMORPHONE HYDROCHLORIDE 2 MG: 2 TABLET ORAL at 21:25

## 2021-08-09 RX ADMIN — CYCLOSPORINE 1 DROP: 0.5 EMULSION OPHTHALMIC at 21:27

## 2021-08-09 RX ADMIN — AMLODIPINE BESYLATE 2.5 MG: 2.5 TABLET ORAL at 17:09

## 2021-08-09 RX ADMIN — ZOLPIDEM TARTRATE 2.5 MG: 5 TABLET, FILM COATED ORAL at 21:25

## 2021-08-09 RX ADMIN — CYCLOSPORINE 1 DROP: 0.5 EMULSION OPHTHALMIC at 09:24

## 2021-08-09 RX ADMIN — FUROSEMIDE 20 MG: 20 TABLET ORAL at 08:06

## 2021-08-09 RX ADMIN — ZOLPIDEM TARTRATE 2.5 MG: 5 TABLET, FILM COATED ORAL at 00:44

## 2021-08-09 RX ADMIN — ONDANSETRON 4 MG: 2 INJECTION INTRAMUSCULAR; INTRAVENOUS at 13:50

## 2021-08-09 RX ADMIN — HYDROMORPHONE HYDROCHLORIDE 2 MG: 2 TABLET ORAL at 09:20

## 2021-08-09 RX ADMIN — HYDROMORPHONE HYDROCHLORIDE 2 MG: 2 TABLET ORAL at 15:31

## 2021-08-09 RX ADMIN — ATENOLOL 50 MG: 50 TABLET ORAL at 08:06

## 2021-08-09 RX ADMIN — HYDROMORPHONE HYDROCHLORIDE 0.2 MG: 0.2 INJECTION, SOLUTION INTRAMUSCULAR; INTRAVENOUS; SUBCUTANEOUS at 02:15

## 2021-08-09 NOTE — CASE MANAGEMENT
LOS 1 Day  URR   Pt is not a 30 day reasmission  Pt is not a [facility] bundle  CM met with Pt at bedside to discuss plans for discharge  Pt is an 79 yo woman who lives alone at [address] [place] [address] [facility]  No [facility]  One floor living  Pt is Independent w/ ADLS, A&O, uses Rollator at baseline  Has a shower chair  Hx of STR reported  PCP HECTOR [place]  Prefers [facility] DR FLORI SANDOVALANTWAN, [address] [facility]  POA/[facility] Friend  Pt did not identify by name, Pt states she keeps a copy in her purse  PT rec for STR/HHPT  Pt is refusing these recs  Pt states her awareness that nothing can be done for her fractures  Pt states her choice to go home and heal on her own  Pt reports the information given to her, that her fractures will take 3-6mo to heal    Pt further states there is not reason to be in the hospital  Pt states she came here on her own volition with intention of receiving an epidural   Pt states she will follow up with her pain management doctor, Carmen Yanez in the OP setting  Pt has set up her own transportation  Pt will discharge on 8/10, transport home w/ [facility]  P/u 1430  CM reviewed d/c planning process including the following: identifying help at home, patient preference for d/c planning needs, availability of treatment team to discuss questions or concerns patient and/or family may have regarding understanding medications and recognizing signs and symptoms once discharged  CM also encouraged patient to follow up with all recommended appointments after discharge  Patient advised of importance for patient and family to participate in managing patients medical well being  CM will con tinue to follow Pt to identify additional needs and concerns in anticipation of discharge on 8/10

## 2021-08-09 NOTE — PHYSICAL THERAPY NOTE
Physical Therapy Screen    Patient Name: Valentino Hones    [id number]'[id number] Date: 8/9/2021     Problem List  Principal Problem:    Intractable back pain  Active Problems:    Paroxysmal atrial fibrillation (HCC)    Benign essential hypertension    Closed compression fracture of L1 lumbar vertebra    Uncomplicated opioid dependence ([address] )    Chronic diastolic (congestive) heart failure (HCC)    Hematuria       Past Medical History  Past Medical History:   Diagnosis Date    Chronic pain     Hyperlipidemia     Hypertension     L1 vertebral fracture (HCC)     Osteoporosis     Paroxysmal A-fib (HCC)     TIA (transient ischemic attack)         Past Surgical History  Past Surgical History:   Procedure Laterality Date    BACK SURGERY          SUBJECT:   "I walk just fine, I don't need physical therapy "  "I am going home tomorrow and that is it "  OBJECTIVE:  Reports functioning at independent level  ASSESSMENT:  Unable to formally assess strength or functional mobility at patient request  She is reporting independence and declines need for therapy services  Intends to return home tomorrow  PLAN:    Will d/c PT at patient request   RECOMMENDATION:  Continue mobilization with nursing  Please advise if pt agreeable to therapy evaluation and reorder as appropriate    Daniel Shannon, PT

## 2021-08-09 NOTE — ASSESSMENT & PLAN NOTE
· Microscopic hematuria in the setting of coumadin used     · Repeat urinalysis / PCP follow-up on discharge

## 2021-08-09 NOTE — CASE MANAGEMENT
Call from [facility] at [facility] that pt calling their outfit to pick her up from hospital as "she's being discharged but doesn't know when as she has to talk to the Dr"  CM Informed [facility] to please disregard the call with SW made aware of same so to make the RN aware

## 2021-08-09 NOTE — PROGRESS NOTES
[facility]  Progress Note - Chana Mccarty 7/20/1932, 80 y o  female MRN: [id number]  Unit/Bed#: E5 -01 Encounter: [id number]  Primary Care Provider: Jerome Lincoln DO   Date and time admitted to hospital: 8/8/2021 11:37 AM    * Intractable back pain  Assessment & Plan  80year old female presenting with intractable back pain possibly as a result of her chronic compression fractures  She was also found to have an acute appearing fracture of the manubrium of the sternum  Patient follows with pain management at [facility] and, Dr Maddie Ko and has been having intractable back pain for the past several months  Has had multiple falls at home  - Pain Control  Takes dilaudid po 2mg QID  Will add IV for breakthrough, lidocaine patch  - Ortho consult  - PT OT consulted however patient declined  - bowel regimen    Hematuria  Assessment & Plan  · Microscopic hematuria in the setting of coumadin used  · Repeat urinalysis / PCP follow-up on discharge    Chronic diastolic (congestive) heart failure (HCC)  Assessment & Plan  Wt Readings from Last 3 Encounters:   08/08/21 56 7 kg (125 lb)   08/03/21 55 3 kg (122 lb)   08/02/21 55 3 kg (122 lb)     Euvolemic, not in acute exacerbation   - Takes lasix 20mg PRN for shortness of breath or weight gain  W  -monitor I and O      Uncomplicated opioid dependence ([facility] [address] )  Assessment & Plan  PDMP Reviewed, takes dilaudid due to her chronic compression fractures    Closed compression fracture of L1 lumbar vertebra  Assessment & Plan  Imaging studies reviewed: Chronic compression fractures of T6, T8, T10, T11, T12, L1, L4 and L5, unchanged since radiographs from 3/9/2021 and 8/2/2021  Prior vertebroplasty is at T12, L4 and L5  Chronic, healed fracture of the body of the sternum  Fracture of the sternal manubrium, acute in appearance  No other evidence of acute fracture    No evidence of osteolytic or osteoblastic lesion        Benign essential hypertension  Assessment & Plan  Controlled  - Continue amlodipine and atenolol    Paroxysmal atrial fibrillation (HCC)  Assessment & Plan  Rate controlled with atenolol and anticoagulated with warfarin  MWF: 1 25mg HS  Rest of the week: 2 5mg hs  VTE Pharmacologic Prophylaxis:   Pharmacologic:  Warfarin    Patient Centered Rounds: I have performed bedside rounds with nursing staff today  Time Spent for Care: 20 minutes  More than 50% of total time spent on counseling and coordination of care as described above  Current Length of Stay: 1 day(s)    Current Patient Status: Inpatient   Certification Statement: The patient will continue to require additional inpatient hospital stay due to Intractable back pain    Discharge Plan / Estimated Discharge Date: 24H    Code Status: Level 3 - DNAR and DNI      Subjective:   Patient seen and examined at bedside, complaining of back pain which is improved with pain medications however patient expresses frustration and it is becoming difficult for her and pain seems to be getting worse  also expresses frustration with not being able to drive with her ongoing problems with cataracts for which she follows outpatient  No loss of sensation, no bowel urinary incontinence    Objective:     Vitals:   Temp (24hrs), Av 7 °F (36 5 °C), Min:97 6 °F (36 4 °C), Max:97 8 °F (36 6 °C)    Temp:  [97 6 °F (36 4 °C)-97 8 °F (36 6 °C)] 97 6 °F (36 4 °C)  HR:  [71-82] 77  Resp:  [18] 18  BP: (108-137)/(62-83) 137/81  SpO2:  [90 %-96 %] 95 %  Body mass index is 24 41 kg/m²  Input and Output Summary (last 24 hours):        Intake/Output Summary (Last 24 hours) at 2021 1601  Last data filed at 2021 1300  Gross per 24 hour   Intake 480 ml   Output --   Net 480 ml       Physical Exam:    Constitutional: Patient is oriented to person, place and time, no acute distress  HEENT:  Normocephalic, atraumatic  Cardiovascular: Normal S1S2, RRR, No murmurs/rubs/gallops appreciated  Pulmonary:  Bilateral air entry, No rhonchi/rales/wheezing appreciated  Abdominal: Soft, Bowel sounds present, Non-tender, Non-distended  Extremities:  No cyanosis, clubbing or edema  Neurological: Cranial nerves II-XII grossly intact, sensation intact, otherwise no focal neurological symptoms  Skin:  Warm, dry    Additional Data:     Labs:    Results from last 7 days   Lab Units 08/09/21  0520   WBC Thousand/uL 6 53   HEMOGLOBIN g/dL 11 4*   HEMATOCRIT % 34 9   PLATELETS Thousands/uL 198   NEUTROS PCT % 43   LYMPHS PCT % 39   MONOS PCT % 11   EOS PCT % 6     Results from last 7 days   Lab Units 08/09/21  0520   POTASSIUM mmol/L 4 2   CHLORIDE mmol/L 106   CO2 mmol/L 28   BUN mg/dL 15   CREATININE mg/dL 0 82   CALCIUM mg/dL 8 7   ALK PHOS U/L 66   ALT U/L 33   AST U/L 42     Results from last 7 days   Lab Units 08/09/21  0520   INR  3 06*        I Have Reviewed All Lab Data Listed Above          Recent Cultures (last 7 days):           Last 24 Hours Medication List:   Current Facility-Administered Medications   Medication Dose Route Frequency Provider Last Rate    amLODIPine  2 5 mg Oral Daily Jacqueline Allen MD      atenolol  50 mg Oral Daily Emma Bashir MD      cycloSPORINE  1 drop Ophthalmic Q12H Emma Bashir MD      Diclofenac Sodium  2 g Topical 4x Daily PRN Emma Bsahir MD      furosemide  20 mg Oral Daily Emma Bashir MD      HYDROmorphone  2 mg Oral Q6H PRN mEma Bashir MD      HYDROmorphone  0 2 mg Intravenous Q6H PRN Emma Bashir MD      ondansetron  4 mg Intravenous Q6H PRN Jacqueline Allen MD      polyethylene glycol  17 g Oral Daily PRN Emma Bashir MD      senna  1 tablet Oral HS Emma Bashir MD      warfarin  1 25 mg Oral Once per day on Mon Wed Fri Emma Bashir MD      warfarin  2 5 mg Oral Once per day on Sun Tue Thu Sat Emma Bashir MD      zolpidem  2 5 mg Oral HS PRN Emma Bashir MD          Today, Patient Was Seen By: Jacqueline Allen MD

## 2021-08-09 NOTE — NURSING NOTE
Patient would like prescription for 2 5 mg Ambien at bedtime when she is discharged sent to [facility]  Dr Haro Payment made aware        Nadeen Villeda, TINO 8/9/2021 4:25 PM

## 2021-08-09 NOTE — PHYSICAL THERAPY NOTE
PHYSICAL THERAPY NOTE          Patient Name: Amandeep CLEARY Date: 8/9/2021  PT consult received  Chart reviewed  Pending ortho consult  Will follow and complete evaluation pending ortho recommendations     Florecita Brito, PT

## 2021-08-09 NOTE — CONSULTS
Orthopaedic Surgery - Consultation  Kishan Bloom (21 y o  female)   : 1932   MRN: [id number]  Date: 2021   Encounter: [id number]   Unit/Bed#: E5 -01    Consulting Physician:  Nikos Guy PA-C  Requesting Physician: Kimberly Roberts MD  Reason for Consult / Principal Problem:   Chronic back pain secondary to multiple compression fractures    Assessment / Plan  Back pain secondary to multiple compression fractures  Most pain at this time in the area of T8 and T10 fractures which may have came from a new fall she suffered a little over a month ago when this area became more painful  Sternal fracture  · Continue with ice / heat or other modalities as needed  · Patient states she has tried  Bracing in the past without success for both her lumbar and thoracic spine  · Continue regimen of pain management with Dilaudid 2 mg q i d  as prescribed by her pain management doctor  · Continue with activity as tolerated  · Follow-up outpatient with her pain management doctor for epidural as she had previously planned  History of Present Illness  Kishan Bloom is a 80 y o  female who presents to [facility] with back pain  She states that this has been a chronic issue that she has been dealing with  She states that she did have a fall in her house over a month ago that caused some increased pain in her chest along with her back  She did follow-up with her pain management doctor who did an x-ray and stated that she had it new compression fracture at T8  At that time she was supposed to make arrangements for an epidural but would have to stop her Coumadin therapy prior to this  She states that nobody contact her to set this up and yesterday she felt that she needed to have an epidural injection but found out her pain management doctor was on vacation so she came to [facility]  She is denying any acute change in her pain level    She denies any change in her bowel or bladder movement or function at this time  She denies any change distally neurovascularly  She states that she was doing physical therapy but was told by her primary care doctor not to do this anymore because she is too old and could cause injury  She states she can get up and go to the bathroom on her own with the walker  She states she has been using a walker for a long time due to weakness generally and for balance      Review of Systems  Negative for chest pain and shortness of breath    Medical, Surgical, Family, and Social History    Past Medical History:   Diagnosis Date    Chronic pain     Hyperlipidemia     Hypertension     L1 vertebral fracture (HCC)     Osteoporosis     Paroxysmal A-fib (HCC)     TIA (transient ischemic attack)        Past Surgical History:   Procedure Laterality Date    BACK SURGERY         Family History   Problem Relation Age of Onset    Heart disease Mother        Social History     Occupational History    Occupation: pharmacist  retired   Tobacco Use    Smoking status: Former Smoker    Smokeless tobacco: Never Used   Vaping Use    Vaping Use: Never used   Substance and Sexual Activity    Alcohol use: Never    Drug use: Never    Sexual activity: Not on file       Allergies   Allergen Reactions    Iodine - Food Allergy Shortness Of Breath    Acyclovir     Buprenorphine GI Intolerance and Other (See Comments)    Hydrocodone-Acetaminophen      Vomiting    Hydrocodone-Acetaminophen      Vomiting  Vomiting    Iodinated Diagnostic Agents     Latex     Metrizamide     Naproxen GI Intolerance    Shellfish Allergy - Food Allergy     Glucosamine Rash    Medical Tape Rash    Other Other (See Comments)     red raised areas - please use paper tape    Shellfish-Derived Products - Food Allergy Rash       Current Facility-Administered Medications   Medication Dose Route Frequency    amLODIPine (NORVASC) tablet 2 5 mg  2 5 mg Oral Daily    atenolol (TENORMIN) tablet 50 mg  50 mg Oral Daily    cycloSPORINE (RESTASIS) 0 05 % ophthalmic emulsion 1 drop  1 drop Ophthalmic Q12H    Diclofenac Sodium (VOLTAREN) 1 % topical gel 2 g  2 g Topical 4x Daily PRN    furosemide (LASIX) tablet 20 mg  20 mg Oral Daily    HYDROmorphone (DILAUDID) tablet 2 mg  2 mg Oral Q6H PRN    HYDROmorphone HCl (DILAUDID) injection 0 2 mg  0 2 mg Intravenous Q6H PRN    ondansetron (ZOFRAN) injection 4 mg  4 mg Intravenous Q6H PRN    polyethylene glycol (MIRALAX) packet 17 g  17 g Oral Daily PRN    senna (SENOKOT) tablet 8 6 mg  1 tablet Oral HS    warfarin (COUMADIN) tablet 1 25 mg  1 25 mg Oral Once per day on Mon Wed Fri    warfarin (COUMADIN) tablet 2 5 mg  2 5 mg Oral Once per day on Sun Tue Thu Sat    zolpidem (AMBIEN) tablet 2 5 mg  2 5 mg Oral HS PRN     Medications Prior to Admission   Medication    amLODIPine (NORVASC) 2 5 mg tablet    atenolol (TENORMIN) 50 mg tablet    furosemide (LASIX) 20 mg tablet    HYDROmorphone (DILAUDID) 2 mg tablet    Jantoven 2 5 MG tablet    Multiple Vitamins tablet    warfarin (COUMADIN) 2 5 mg tablet    warfarin (COUMADIN) 2 5 mg tablet    Cholecalciferol 2000 units CAPS    cycloSPORINE (RESTASIS) 0 05 % ophthalmic emulsion    diclofenac sodium (VOLTAREN) 1 %       Vitals  Temp:  [97 6 °F (36 4 °C)-97 8 °F (36 6 °C)] 97 6 °F (36 4 °C)  HR:  [77-82] 77  Resp:  [18] 18  BP: (108-137)/(65-83) 137/81  Body mass index is 24 41 kg/m²  I/O last 24 hours: In: 18 [P O :480]  Out: -     Physical Exam  General:  Alert & oriented x3, no distress, appears stated age  [de-identified]:  EOMI, eyes clear, hearing intact, mucous membranes moist  Neck:  Supple, non-tender, trachea midline, no lymphadenopathy  Cardiovascular:  Regular rate, no discernable arrhythmia  Pulmonary:  Regular rate, respirations non-labored   Abdominal:  Soft, non-tender    Spine Exam  Alignment:  Increased thoracic kyphosis with scoliosis noted throughout the spine in the lumbar region    Right knee does have a large varus deformity and flexion contracture  Inspection:  No swelling  No edema  No erythema  No ecchymosis  Palpation:  Mild tenderness at Paraspinal musculature around the thoracic spine and no pain around the lumbar spine at this time prior no pain centrally over the thoracic spine for patient does not have discomfort with palpation around the right knee which she states is known to be arthritic    ROM:  Normal hip ROM  Patient refuses to perform full extension of the right knee and states that does not go past about 20° to she is now on a get hurt she does not want to try to do this  Her left knee does go to full extension at this time  Normal ankle ROM  Strength:  Strength testing was difficult for her lower extremities as she did refuse to do test easing active resistance she was able to flex her hips, extend her knees, flex her knees and performed   Dorsiflexion and plantar flexion without much issue  She refused to try to resist resistive movement  Tests:  No pertinent positive or negative tests  Neurovascular:  Sensation intact in DP/SP/Caldera/Sa/T nerve distributions  Sensation intact in all digital nerve distributions  Toes warm and perfused  Gait:  Not tested  Imaging  I have personally reviewed pertinent films in PACS  CT of Chest abdomen and pelvis  From 08/08/2021 - Show chronic compression fractures of T6, T8, T10, T11, T12, L1, L4 and L5  There is of fracture of the sternal manubrium acute in appearance      Lab Results  (I have personally reviewed pertinent lab results )  Results from last 7 days   Lab Units 08/09/21  0520 08/08/21  1245   WBC Thousand/uL 6 53 6 65   HEMOGLOBIN g/dL 11 4* 12 5   HEMATOCRIT % 34 9 38 5   PLATELETS Thousands/uL 198 232   RBC Million/uL 3 61* 3 96   MCV fL 97 97   MCH pg 31 6 31 6   MCHC g/dL 32 7 32 5   RDW % 13 9 13 9   MPV fL 9 2 9 3   NRBC AUTO /100 WBCs 0 0     Results from last 7 days   Lab Units 08/09/21  0520 08/08/21  1245 08/04/21  0956   INR 3 06* 2 93* 2 35*     Results from last 7 days   Lab Units 08/09/21  0520 08/08/21  1245   POTASSIUM mmol/L 4 2 4 3   CHLORIDE mmol/L 106 102   CO2 mmol/L 28 28   BUN mg/dL 15 18   CREATININE mg/dL 0 82 0 87   EGFR ml/min/1 73sq m 64 59   CALCIUM mg/dL 8 7 9 2   PHOSPHORUS mg/dL 3 6  --    ALT U/L 33 41   AST U/L 42 67*                 Code Status  Level 3 - DNAR and DNI    Counseling / Coordination of Care  Total floor / unit time spent today 20 minutes  Greater than 50% of total time was spent with the patient and / or family counseling and / or coordination of care      Marcellus Andrew PA-C

## 2021-08-09 NOTE — NURSING NOTE
Patient requested Amlodipine be moved ninfa 1800, as this is when she takes it at home      Esteban Pendleton RN 8/9/2021 12:11 PM

## 2021-08-09 NOTE — OCCUPATIONAL THERAPY NOTE
Occupational Therapy         Patient Name: Violet Jordan  [id number] Date: 8/9/2021      MD's orders received  Pt pending ortho consult  Will continue when cleared by ortho   Oralia oSuza OT

## 2021-08-09 NOTE — NURSING NOTE
Patient frustrated about care being received, feels as if "there's nothing we can do" to help her  RN educated patient still needed to be seen by ortho MD, who may be able to help her  Patient insisting that she leaves tomorrow 08/10/21 and that she arrange her own ride via [employer], as last time DANIEL arranged ride, it was $25 00 more  Patient called [employer] and arranged ride for 1430 08/10/21  Dr Rufina Goldstein and DANIEL made aware       Kevin Chapman RN 8/9/2021 12:06 PM

## 2021-08-09 NOTE — ASSESSMENT & PLAN NOTE
Rate controlled with atenolol and anticoagulated with warfarin  MWF: 1 25mg HS  Rest of the week: 2 5mg hs

## 2021-08-09 NOTE — NURSING NOTE
Patient wore tele as order by Dr Rufina Goldstein for reported "palpiatations", then patient stated resolved palpitations and refused to wear tele  Tele was reading NSR with occasional PVCs, no acute signs of distress  Dr Rufina Goldstein made aware      Kevin Chapman RN 8/9/2021 1:26 PM

## 2021-08-09 NOTE — PLAN OF CARE
Problem: Potential for Falls  Goal: Patient will remain free of falls  Description: INTERVENTIONS:  - Educate patient/family on patient safety including physical limitations  - Instruct patient to call for assistance with activity   - Consult OT/PT to assist with strengthening/mobility   - Keep Call bell within reach  - Keep bed low and locked with side rails adjusted as appropriate  - Keep care items and personal belongings within reach  - Initiate and maintain comfort rounds  - Make Fall Risk Sign visible to staff  - Apply yellow socks and bracelet for high fall risk patients  - Consider moving patient to room near nurses station  Outcome: Progressing     Problem: MOBILITY - ADULT  Goal: Maintain or return to baseline ADL function  Description: INTERVENTIONS:  -  Assess patient's ability to carry out ADLs; assess patient's baseline for ADL function and identify physical deficits which impact ability to perform ADLs (bathing, care of mouth/teeth, toileting, grooming, dressing, etc )  - Assess/evaluate cause of self-care deficits   - Assess range of motion  - Assess patient's mobility; develop plan if impaired  - Assess patient's need for assistive devices and provide as appropriate  - Encourage maximum independence but intervene and supervise when necessary  - Involve family in performance of ADLs  - Assess for home care needs following discharge   - Consider OT consult to assist with ADL evaluation and planning for discharge  - Provide patient education as appropriate  Outcome: Progressing  Goal: Maintains/Returns to pre admission functional level  Description: INTERVENTIONS:  - Perform BMAT or MOVE assessment daily    - Set and communicate daily mobility goal to care team and patient/family/caregiver     - Collaborate with rehabilitation services on mobility goals if consulted  - Out of bed for toileting  - Record patient progress and toleration of activity level   Outcome: Progressing     Problem: PAIN - ADULT  Goal: Verbalizes/displays adequate comfort level or baseline comfort level  Description: Interventions:  - Encourage patient to monitor pain and request assistance  - Assess pain using appropriate pain scale  - Administer analgesics based on type and severity of pain and evaluate response  - Implement non-pharmacological measures as appropriate and evaluate response  - Consider cultural and social influences on pain and pain management  - Notify physician/advanced practitioner if interventions unsuccessful or patient reports new pain  Outcome: Progressing     Problem: INFECTION - ADULT  Goal: Absence or prevention of progression during hospitalization  Description: INTERVENTIONS:  - Assess and monitor for signs and symptoms of infection  - Monitor lab/diagnostic results  - Monitor all insertion sites, i e  indwelling lines, tubes, and drains  - Monitor endotracheal if appropriate and nasal secretions for changes in amount and color  - [place] appropriate cooling/warming therapies per order  - Administer medications as ordered  - Instruct and encourage patient and family to use good hand hygiene technique  - Identify and instruct in appropriate isolation precautions for identified infection/condition  Outcome: Progressing  Goal: Absence of fever/infection during neutropenic period  Description: INTERVENTIONS:  - Monitor WBC    Outcome: Progressing     Problem: DISCHARGE PLANNING  Goal: Discharge to home or other facility with appropriate resources  Description: INTERVENTIONS:  - Identify barriers to discharge w/patient and caregiver  - Arrange for needed discharge resources and transportation as appropriate  - Identify discharge learning needs (meds, wound care, etc )  - Arrange for interpretive services to assist at discharge as needed  - Refer to Case Management Department for coordinating discharge planning if the patient needs post-hospital services based on physician/advanced practitioner order or complex needs related to functional status, cognitive ability, or social support system  Outcome: Progressing     Problem: Knowledge Deficit  Goal: Patient/family/caregiver demonstrates understanding of disease process, treatment plan, medications, and discharge instructions  Description: Complete learning assessment and assess knowledge base    Interventions:  - Provide teaching at level of understanding  - Provide teaching via preferred learning methods  Outcome: Progressing

## 2021-08-09 NOTE — ASSESSMENT & PLAN NOTE
80year old female presenting with intractable back pain possibly as a result of her chronic compression fractures  She was also found to have an acute appearing fracture of the manubrium of the sternum  Patient follows with pain management at [facility] and, Dr Darron Chandra and has been having intractable back pain for the past several months  Has had multiple falls at home  - Pain Control  Takes dilaudid po 2mg QID   Will add IV for breakthrough, lidocaine patch  - Ortho consult  - PT OT consulted however patient declined  - bowel regimen

## 2021-08-09 NOTE — ASSESSMENT & PLAN NOTE
Wt Readings from Last 3 Encounters:   08/08/21 56 7 kg (125 lb)   08/03/21 55 3 kg (122 lb)   08/02/21 55 3 kg (122 lb)     Euvolemic, not in acute exacerbation   - Takes lasix 20mg PRN for shortness of breath or weight gain   W  -monitor I and O

## 2021-08-10 VITALS
HEART RATE: 81 BPM | BODY MASS INDEX: 24.54 KG/M2 | WEIGHT: 125 LBS | DIASTOLIC BLOOD PRESSURE: 65 MMHG | HEIGHT: 60 IN | RESPIRATION RATE: 18 BRPM | SYSTOLIC BLOOD PRESSURE: 96 MMHG | OXYGEN SATURATION: 95 % | TEMPERATURE: 97.4 F

## 2021-08-10 LAB
INR PPP: 2.95 (ref 0.84–1.19)
PROTHROMBIN TIME: 30.1 SECONDS (ref 11.6–14.5)

## 2021-08-10 PROCEDURE — 85610 PROTHROMBIN TIME: CPT | Performed by: INTERNAL MEDICINE

## 2021-08-10 PROCEDURE — 99239 HOSP IP/OBS DSCHRG MGMT >30: CPT | Performed by: INTERNAL MEDICINE

## 2021-08-10 RX ORDER — ZOLPIDEM TARTRATE 5 MG/1
2.5 TABLET ORAL
Qty: 2 TABLET | Refills: 0 | Status: SHIPPED | OUTPATIENT
Start: 2021-08-10 | End: 2021-08-12 | Stop reason: SDUPTHER

## 2021-08-10 RX ORDER — ZOLPIDEM TARTRATE 5 MG/1
2.5 TABLET ORAL
Qty: 2 TABLET | Refills: 0 | Status: SHIPPED | OUTPATIENT
Start: 2021-08-10 | End: 2021-08-10

## 2021-08-10 RX ADMIN — HYDROMORPHONE HYDROCHLORIDE 0.2 MG: 0.2 INJECTION, SOLUTION INTRAMUSCULAR; INTRAVENOUS; SUBCUTANEOUS at 08:01

## 2021-08-10 RX ADMIN — HYDROMORPHONE HYDROCHLORIDE 2 MG: 2 TABLET ORAL at 11:35

## 2021-08-10 RX ADMIN — HYDROMORPHONE HYDROCHLORIDE 2 MG: 2 TABLET ORAL at 03:33

## 2021-08-10 NOTE — PLAN OF CARE
Problem: MOBILITY - ADULT  Goal: Maintain or return to baseline ADL function  Description: INTERVENTIONS:  -  Assess patient's ability to carry out ADLs; assess patient's baseline for ADL function and identify physical deficits which impact ability to perform ADLs (bathing, care of mouth/teeth, toileting, grooming, dressing, etc )  - Assess/evaluate cause of self-care deficits   - Assess range of motion  - Assess patient's mobility; develop plan if impaired  - Assess patient's need for assistive devices and provide as appropriate  - Encourage maximum independence but intervene and supervise when necessary  - Involve family in performance of ADLs  - Assess for home care needs following discharge   - Consider OT consult to assist with ADL evaluation and planning for discharge  - Provide patient education as appropriate  Outcome: Progressing  Goal: Maintains/Returns to pre admission functional level  Description: INTERVENTIONS:  - Perform BMAT or MOVE assessment daily    - Set and communicate daily mobility goal to care team and patient/family/caregiver     - Collaborate with rehabilitation services on mobility goals if consulted  Problem: PAIN - ADULT  Goal: Verbalizes/displays adequate comfort level or baseline comfort level  Description: Interventions:  - Encourage patient to monitor pain and request assistance  - Assess pain using appropriate pain scale  - Administer analgesics based on type and severity of pain and evaluate response  - Implement non-pharmacological measures as appropriate and evaluate response  - Consider cultural and social influences on pain and pain management  - Notify physician/advanced practitioner if interventions unsuccessful or patient reports new pain  Outcome: Progressing     Problem: INFECTION - ADULT  Goal: Absence or prevention of progression during hospitalization  Description: INTERVENTIONS:  - Assess and monitor for signs and symptoms of infection  - Monitor lab/diagnostic results  - Monitor all insertion sites, i e  indwelling lines, tubes, and drains  - Monitor endotracheal if appropriate and nasal secretions for changes in amount and color  - [place] appropriate cooling/warming therapies per order  - Administer medications as ordered  - Instruct and encourage patient and family to use good hand hygiene technique  - Identify and instruct in appropriate isolation precautions for identified infection/condition  Outcome: Progressing  Goal: Absence of fever/infection during neutropenic period  Description: INTERVENTIONS:  - Monitor WBC    Outcome: Progressing     Problem: DISCHARGE PLANNING  Goal: Discharge to home or other facility with appropriate resources  Description: INTERVENTIONS:  - Identify barriers to discharge w/patient and caregiver  - Arrange for needed discharge resources and transportation as appropriate  - Identify discharge learning needs (meds, wound care, etc )  - Arrange for interpretive services to assist at discharge as needed  - Refer to Case Management Department for coordinating discharge planning if the patient needs post-hospital services based on physician/advanced practitioner order or complex needs related to functional status, cognitive ability, or social support system  Outcome: Progressing     Problem: Knowledge Deficit  Goal: Patient/family/caregiver demonstrates understanding of disease process, treatment plan, medications, and discharge instructions  Description: Complete learning assessment and assess knowledge base    Interventions:  - Provide teaching at level of understanding  - Provide teaching via preferred learning methods  Outcome: Progressing     - Out of bed for toileting  - Record patient progress and toleration of activity level   Outcome: Progressing

## 2021-08-10 NOTE — NURSING NOTE
Patient resting, complaints of pain PRN medication given  Call bell and personal belongings at bedside within reach   Will continue to monitor

## 2021-08-10 NOTE — DISCHARGE SUMMARY
[facility]  Discharge- Elizabeth Utuado 7/20/1932, 80 y o  female MRN: [id number]  Unit/Bed#: E5 -01 Encounter: [id number]  Primary Care Provider: Lisa Dukes DO   Date and time admitted to hospital: 8/8/2021 11:37 AM    * Intractable back pain  Assessment & Plan  80year old female presenting with intractable back pain possibly as a result of her chronic compression fractures  She was also found to have an acute appearing fracture of the manubrium of the sternum  Patient follows with pain management at [facility] and, Dr Darron Chandra and has been having intractable back pain for the past several months  Has had multiple falls at home  - Pain Control  Takes dilaudid po 2mg QID  Will add IV for breakthrough, lidocaine patch  - Ortho consult appreciated recommend continue with pain control, nonsurgical management  - PT OT consulted however patient declined--states she currently gets physical therapy outpatient    Hematuria  Assessment & Plan  · Microscopic hematuria in the setting of coumadin used  · Repeat urinalysis / PCP follow-up on discharge  · Have sent message to PCP    Chronic diastolic (congestive) heart failure (HCC)  Assessment & Plan  Wt Readings from Last 3 Encounters:   08/08/21 56 7 kg (125 lb)   08/03/21 55 3 kg (122 lb)   08/02/21 55 3 kg (122 lb)     Euvolemic, not in acute exacerbation   - Takes lasix 20mg PRN for shortness of breath or weight gain  Uncomplicated opioid dependence ([facility] [address] )  Assessment & Plan  PDMP Reviewed, takes dilaudid due to her chronic compression fractures    Closed compression fracture of L1 lumbar vertebra  Assessment & Plan  Imaging studies reviewed: Chronic compression fractures of T6, T8, T10, T11, T12, L1, L4 and L5, unchanged since radiographs from 3/9/2021 and 8/2/2021  Prior vertebroplasty is at T12, L4 and L5  Chronic, healed fracture of the body of the sternum  Fracture of the sternal manubrium, acute in appearance    No other evidence of acute fracture  No evidence of osteolytic or osteoblastic lesion        Benign essential hypertension  Assessment & Plan  Controlled  - Continue amlodipine and atenolol    Paroxysmal atrial fibrillation (HCC)  Assessment & Plan  Rate controlled with atenolol and anticoagulated with warfarin  MWF: 1 25mg HS  Rest of the week: 2 5mg hs  Transition of Care Discharge Summary - [facility]    Patient Information: Elizabeth Marin 80 y o  female MRN: [id number]  Unit/Bed#: E5 -01 Encounter: [id number]    Discharging Physician / Practitioner: Ivette Mackenzie MD  PCP: Lisa Dukes DO  Admission Date: 8/8/2021  Discharge Date: 08/10/21    Disposition:      Other: home      Reason for Admission: back pain    Discharge Diagnoses:     Principal Problem:    Intractable back pain  Active Problems:    Paroxysmal atrial fibrillation (HCC)    Benign essential hypertension    Closed compression fracture of L1 lumbar vertebra    Uncomplicated opioid dependence ([facility] [address] )    Chronic diastolic (congestive) heart failure (HCC)    Hematuria  Resolved Problems:    * No resolved hospital problems  *      Consultations During Hospital Stay:  · IP CONSULT TO ORTHOPEDIC SURGERY      Procedures Performed:     · none    Medication Adjustments and Discharge Medications:  · Medication Dosing Tapers - Please refer to Discharge Medication List for details on any medication dosing tapers (if applicable to patient)  · Discharge Medication List: See after visit summary for reconciled discharge medications  Wound Care Recommendations:  When applicable, please see wound care section of After Visit Summary  Diet Recommendations at Discharge:  Diet -        Diet Orders   (From admission, onward)             Start     Ordered    08/08/21 1800  Diet Regular; Regular House  Diet effective now     Question Answer Comment   Diet Type Regular    Regular Regular House    RD to adjust diet per protocol?  Yes        08/08/21 5953 Fluid Restriction - No Fluid Restriction at Discharge  Significant Findings / Test Results:     CT chest abdomen pelvis revealed normal, per thoracic aorta with no evidence of aneurysm, areas of subsegmental atelectasis in both lungs, no acute abnormality, chronic compression fractures at several thoracic and lumbar vertebral levels unchanged, acute appearing fracture of manubrium of sternum  CT recon spine revealed multiple compression fractures in thoracic and lumbar spine    Hospital Course:     Chana Mccarty is a 80 y o  female patient who originally presented to the hospital on 8/8/2021 due to back pain  Patient has chronic back pain and follows pain management outpatient  Patient states pain has been getting worse and she was scheduled for epidural however pain management doctor had to reschedule admits he was on vacation  States she takes Dilaudid 2 mg 4 times daily  Patient was admitted and treated for pain, evaluated by Orthopedic surgery  PT try to evaluate patient however patient declined stating she is able to ambulate and okay with getting therapy outpatient as she was prior  Patient is currently arranging for an epidural with her pain management doctor  Please see above problem list for further details  Condition at Discharge: good     Discharge Day Visit / Exam:     Subjective:  Patient denies any abdominal pain, nausea, vomiting  Still states she has back pain but feels it is her chronic back pain  States she would feel much better going and taking her medications at home    Offered more physical therapy and possible rehab however patient declined stating she gets outpatient therapy    Vitals: Blood Pressure: 96/65 (08/10/21 0732)  Pulse: 81 (08/10/21 0732)  Temperature: (!) 97 4 °F (36 3 °C) (08/09/21 2308)  Temp Source: Oral (08/09/21 2308)  Respirations: 18 (08/10/21 0732)  Height: 5' (152 4 cm) (08/08/21 1829)  Weight - Scale: 56 7 kg (125 lb) (08/08/21 1149)  SpO2: 95 % (08/10/21 2939)    Physical Exam:    Constitutional: Patient is oriented to person, place and time, no acute distress  HEENT:  Normocephalic, atraumatic  Cardiovascular: Normal S1S2, RRR, No murmurs/rubs/gallops appreciated  Pulmonary:  Bilateral air entry, No rhonchi/rales/wheezing appreciated  Abdominal: Soft, Bowel sounds present, Non-tender, Non-distended  Extremities:  No cyanosis, clubbing or edema  Neurological: Cranial nerves II-XII grossly intact, sensation intact, otherwise no focal neurological symptoms  Discharge instructions/Information to patient and family:   See after visit summary section titled Discharge Instructions for information provided to patient and family  Planned Readmission: no     Discharge Statement:  I spent 35 minutes discharging the patient  This time was spent on the day of discharge  I had direct contact with the patient on the day of discharge  Greater than 50% of the total time was spent examining patient, answering all patient questions, arranging and discussing plan of care with patient as well as directly providing post-discharge instructions  Additional time then spent on discharge activities      ** Please Note: This note has been constructed using a voice recognition system **

## 2021-08-10 NOTE — ASSESSMENT & PLAN NOTE
80year old female presenting with intractable back pain possibly as a result of her chronic compression fractures  She was also found to have an acute appearing fracture of the manubrium of the sternum  Patient follows with pain management at [facility] and, Dr Lorenzo Guzman and has been having intractable back pain for the past several months  Has had multiple falls at home  - Pain Control  Takes dilaudid po 2mg QID   Will add IV for breakthrough, lidocaine patch  - Ortho consult  - PT OT consulted however patient declined  - bowel regimen

## 2021-08-11 ENCOUNTER — TRANSITIONAL CARE MANAGEMENT (OUTPATIENT)
Dept: FAMILY MEDICINE CLINIC | Facility: CLINIC | Age: 86
End: 2021-08-11

## 2021-08-12 ENCOUNTER — TELEPHONE (OUTPATIENT)
Dept: CARDIOLOGY CLINIC | Facility: CLINIC | Age: 86
End: 2021-08-12

## 2021-08-12 DIAGNOSIS — G47.00 INSOMNIA: ICD-10-CM

## 2021-08-12 RX ORDER — ZOLPIDEM TARTRATE 5 MG/1
2.5 TABLET ORAL
Qty: 2 TABLET | Refills: 0 | Status: SHIPPED | OUTPATIENT
Start: 2021-08-12 | End: 2021-08-20

## 2021-08-12 NOTE — TELEPHONE ENCOUNTER
Called patient   last dose warfarin 8/9   she stopped on her own    ok to stay off and hopefully will be able to get procedure on Monday    I will fax form tomorrow PM

## 2021-08-12 NOTE — TELEPHONE ENCOUNTER
Patient needs epidural injection for pain by [facility] Dr Krishna Rosario and Kieran Goodson needs coumadin hold prior ASAP  She states she is in so much pain  We just received hold today 8/12/21 but Kieran Goodson says she has been waiting for a 1/2week now until they sent form  Form wants a five day hold  It is in Dr Kristian Jones  Please advise if you can so she can get this scheduled for early next week

## 2021-08-13 NOTE — TELEPHONE ENCOUNTER
LILLIE Moralez's office and they cannot get her in until Thurs or Fri and patient went off her coumadin on her own assuming that she is getting her injection Mon or Tues  They want her restarted until they can get her in  I will let Dr Gaurang Goldstein know so he can properly hold her warfarin

## 2021-08-18 ENCOUNTER — TELEPHONE (OUTPATIENT)
Dept: LAB | Facility: HOSPITAL | Age: 86
End: 2021-08-18

## 2021-08-18 ENCOUNTER — APPOINTMENT (OUTPATIENT)
Dept: LAB | Facility: HOSPITAL | Age: 86
End: 2021-08-18
Payer: MEDICARE

## 2021-08-19 ENCOUNTER — TELEPHONE (OUTPATIENT)
Dept: CARDIOLOGY CLINIC | Facility: CLINIC | Age: 86
End: 2021-08-19

## 2021-08-19 ENCOUNTER — ANTICOAG VISIT (OUTPATIENT)
Dept: CARDIOLOGY CLINIC | Facility: CLINIC | Age: 86
End: 2021-08-19

## 2021-08-19 DIAGNOSIS — I48.0 PAROXYSMAL ATRIAL FIBRILLATION (HCC): Primary | ICD-10-CM

## 2021-08-19 NOTE — PROGRESS NOTES
From call from steve at [facility], pt will be discharged today on Lovenox 50 mg bid, will be given 7 day supply  Will be instructed by pain management when to restart warfarin 2 5 mg daily and when to restart Lovenox injection post back injection today  Pt inr due tomorrow

## 2021-08-19 NOTE — TELEPHONE ENCOUNTER
Phone call from nurse, practitioner, Sandi Walters at [facility] , [phone]  Calling to report patient is being discharged today form [facility], s/p back pain  Warfarin has been on hold due to planned back injection to be done today  Patient has been given Lovenox 50 mg sq bid for bridging  Warfarin will be restarted today at 2 5 mg daily  Pt is to go to lab tomorrow for pt/inr testing  Will note in anti coag clinic and notify dr Roz Fletcher     Per Kuldeep Larose, patient will prescription for  7 day supply of Lovenox syrinages

## 2021-08-19 NOTE — TELEPHONE ENCOUNTER
Phone call from patient at [facility]  Calling to report she is in hospital and questioning what to do concerning warfarin  Explained I did receive a call from steve, nurse practitioner , and she did explain  current plan, however, all instructions regarding warfarin and Lovenox injections must be provided by hospital and pain management , explained nurse at hospital and doctors will provided most updated instructions  Requested patient call me when she is home to further discuss her warfarin and bridging and inr testing post discharge

## 2021-08-20 ENCOUNTER — NURSING HOME VISIT (OUTPATIENT)
Dept: GERIATRICS | Facility: OTHER | Age: 86
End: 2021-08-20
Payer: MEDICARE

## 2021-08-20 DIAGNOSIS — M54.9 INTRACTABLE BACK PAIN: Primary | ICD-10-CM

## 2021-08-20 DIAGNOSIS — I48.0 PAROXYSMAL ATRIAL FIBRILLATION (HCC): ICD-10-CM

## 2021-08-20 DIAGNOSIS — I50.32 CHRONIC DIASTOLIC (CONGESTIVE) HEART FAILURE (HCC): ICD-10-CM

## 2021-08-20 DIAGNOSIS — K59.04 CHRONIC IDIOPATHIC CONSTIPATION: ICD-10-CM

## 2021-08-20 DIAGNOSIS — I10 BENIGN ESSENTIAL HYPERTENSION: ICD-10-CM

## 2021-08-20 DIAGNOSIS — S22.21XD CLOSED FRACTURE OF MANUBRIUM WITH ROUTINE HEALING, SUBSEQUENT ENCOUNTER: ICD-10-CM

## 2021-08-20 PROBLEM — S22.21XA FRACTURE OF MANUBRIUM: Status: ACTIVE | Noted: 2021-08-20

## 2021-08-20 PROCEDURE — 99306 1ST NF CARE HIGH MDM 50: CPT | Performed by: INTERNAL MEDICINE

## 2021-08-20 RX ORDER — SENNA PLUS 8.6 MG/1
8.6 TABLET ORAL
COMMUNITY
Start: 2021-08-19 | End: 2022-08-19

## 2021-08-20 RX ORDER — METHOCARBAMOL 750 MG/1
750 TABLET, FILM COATED ORAL 4 TIMES DAILY
COMMUNITY
Start: 2021-08-19 | End: 2021-08-29

## 2021-08-20 RX ORDER — ACETAMINOPHEN 500 MG
1000 TABLET ORAL 3 TIMES DAILY
COMMUNITY
Start: 2021-08-19 | End: 2021-08-29

## 2021-08-20 RX ORDER — WARFARIN SODIUM 5 MG/1
TABLET ORAL
COMMUNITY

## 2021-08-20 NOTE — ASSESSMENT & PLAN NOTE
Continues to have severe pain  Was given epidural but not much affect as per patient  Was discharged on dilaudid 2mg po q6h prn from hospital but as per records she was on dilaudid 2mg po 4 times a day and as per nurse covering independent living who stated it was increased to 5 x a day     Patient is opioid dependent   Will for now make it dilaudid 2mg po 5 x a day prn  Continue methocarbamol for a week  Continue tylenol 1000mg po 3 x a day  Continue all other meds  Continue follow up with pain management  Rehab consult

## 2021-08-20 NOTE — TELEPHONE ENCOUNTER
Tc to patient, left message on answering machine, noted [facility] is at [facility] for [facility], left message on her answering machine to call office when she is discharged to her home

## 2021-08-20 NOTE — PROGRESS NOTES
[facility] [address] notes  SHORT TERM REHAB       NAME: Kishan Bloom  AGE: 80 y o  SEX: female    DATE OF ENCOUNTER: 8/20/2021    Assessment and Plan   Intractable back pain  Continues to have severe pain  Was given epidural but not much affect as per patient  Was discharged on dilaudid 2mg po q6h prn from hospital but as per records she was on dilaudid 2mg po 4 times a day and as per nurse covering independent living who stated it was increased to 5 x a day  Patient is opioid dependent   Will for now make it dilaudid 2mg po 5 x a day prn  Continue methocarbamol for a week  Continue tylenol 1000mg po 3 x a day  Continue all other meds  Continue follow up with pain management  Rehab consult     Benign essential hypertension  BP slightly elevated today but maybe due to pain  Continue current meds  Continue monitoring BP    Paroxysmal atrial fibrillation (HCC)  Rate controlled on current meds  Was on coumadin 1 25mg on MWF and 2 5mg po other days but since currently is subtherapeutic will give her 5mg po daily x 2 days and 2 5mg po then one day and get INR on Monday  Will continue lovenox till INR therapeutic as per cardiology recommendations     Chronic diastolic (congestive) heart failure (HCC)  Continue diuretics prn   Continue monitoring weight  Currently euvolemic          Chronic idiopathic constipation  Most likely due to her meds  Continue current meds  Continue monitoring bowels    Fracture of manubrium  Continue with pain management  Continue monitoring worsening symptoms         Chief Complaint     Back pain     History of Present Illness     81 yo female seen for admission to [address] [place] [address] after hospitalization  As per patient she was having back problem due to her fractures and was following with pain management   She went to the hospital because her pain was not controlled and her pain management doctor was away and was not able to get epidural  She was then discharged from the hospital with medication adjustment but it was getting worst again and went to the another hospital where she was managed for pain  She was then discharged to go to pain management directly from there to get epidural  After she got that she was discharged to [address] [place] [address] Road for therapy  Reviewed labs and imaging reports from the hospital records  PMHx     Past Medical History:   Diagnosis Date    Chronic pain     Hyperlipidemia     Hypertension     L1 vertebral fracture (HCC)     Osteoporosis     Paroxysmal A-fib (HCC)     TIA (transient ischemic attack)      Past Surgical History:   Procedure Laterality Date    BACK SURGERY       Family History   Problem Relation Age of Onset    Heart disease Mother      Social History     Socioeconomic History    Marital status: Single     Spouse name: None    Number of children: None    Years of education: None    Highest education level: None   Occupational History    Occupation: pharmacist  retired   Tobacco Use    Smoking status: Former Smoker    Smokeless tobacco: Never Used   Vaping Use    Vaping Use: Never used   Substance and Sexual Activity    Alcohol use: Never    Drug use: Never    Sexual activity: None   Other Topics Concern    None   Social History Narrative    None     Social Determinants of Health     Financial Resource Strain: Low Risk     Difficulty of Paying Living Expenses: Not hard at all   Food Insecurity: Unknown    Worried About Running Out of Food in the Last Year: Never true    Hiram of Food in the Last Year: Not on file   Transportation Needs: No Transportation Needs    Lack of Transportation (Medical): No    Lack of Transportation (Non-Medical): No   Physical Activity: Insufficiently Active    Days of Exercise per Week: 1 day    Minutes of Exercise per Session: 10 min   Stress: No Stress Concern Present    Feeling of Stress : Not at all   Social Connections:  Moderately Isolated    Frequency of Communication with Friends and Family: More than three times a week    Frequency of Social Gatherings with Friends and Family: More than three times a week    Attends [employer] Services: 1 to 4 times per year    Active Member of [employer] or Organizations: No    Attends Club or Organization Meetings: Never    Marital Status: Never    Intimate Partner Violence: Not At Risk    Fear of Current or Ex-Partner: No    Emotionally Abused: No    Physically Abused: No    Sexually Abused: No     Allergies   Allergen Reactions    Iodine - Food Allergy Shortness Of Breath    Acyclovir     Buprenorphine GI Intolerance and Other (See Comments)    Hydrocodone-Acetaminophen      Vomiting    Hydrocodone-Acetaminophen      Vomiting  Vomiting    Iodinated Diagnostic Agents     Latex     Metrizamide     Naproxen GI Intolerance    Shellfish Allergy - Food Allergy     Glucosamine Rash    Medical Tape Rash    Other Other (See Comments)     red raised areas - please use paper tape    Shellfish-Derived Products - Food Allergy Rash       Review of Systems     Back pain   All other review of system negative        Objective   Vital signs:  BP: 143/73  HR: 80  RR: 20  TEMP: 98 4  SAT  : 95% on RA    PHYSICAL EXAM:  GENERAL: no acute distress, chronically ill appearing, frail looking  SKIN: warm, dry, no rash, no cyanosis  HEENT: normocephalic, atraumatic, no JVD, no Thyromegaly, no lymphadenopathy  LUNGS: CTA, no wheezing, no rales, expanded equally, no chest tenderness   HEART: normal rhythm, normal rate, no murmur, no gallop  ABDOMEN: soft non tender non distended bs+, no guarding or rebound tenderness  :  no suprapubic tenderness  MUSCULOSKELETAL: strength about 4+/5 all extremities, ROM within normal, bilateral lower leg edema trace, no calf tenderness  NEUROLOGY: awake, alert, Ox3, able to recall 2/3 object  CN2-12 intact  PSYCH: cooperative, pleasant         Pertinent Laboratory/Diagnostic Studies:  Recent labs and diagnostic tests reviewed in nursing home EMR    Current Medications   Medications reviewed and signed off on nursing home EMR

## 2021-08-20 NOTE — ASSESSMENT & PLAN NOTE
Rate controlled on current meds  Was on coumadin 1 25mg on MWF and 2 5mg po other days but since currently is subtherapeutic will give her 5mg po daily x 2 days and 2 5mg po then one day and get INR on Monday   Will continue lovenox till INR therapeutic as per cardiology recommendations

## 2021-08-21 DIAGNOSIS — M54.9 INTRACTABLE BACK PAIN: Primary | ICD-10-CM

## 2021-08-21 RX ORDER — HYDROMORPHONE HYDROCHLORIDE 2 MG/1
TABLET ORAL
Qty: 60 TABLET | Refills: 0 | Status: SHIPPED | OUTPATIENT
Start: 2021-08-21

## 2021-08-27 ENCOUNTER — NURSING HOME VISIT (OUTPATIENT)
Dept: GERIATRICS | Facility: OTHER | Age: 86
End: 2021-08-27
Payer: MEDICARE

## 2021-08-27 DIAGNOSIS — I48.0 PAROXYSMAL ATRIAL FIBRILLATION (HCC): ICD-10-CM

## 2021-08-27 DIAGNOSIS — M54.9 INTRACTABLE BACK PAIN: Primary | ICD-10-CM

## 2021-08-27 DIAGNOSIS — I50.32 CHRONIC DIASTOLIC (CONGESTIVE) HEART FAILURE (HCC): ICD-10-CM

## 2021-08-27 PROCEDURE — 99309 SBSQ NF CARE MODERATE MDM 30: CPT | Performed by: INTERNAL MEDICINE

## 2021-08-27 NOTE — ASSESSMENT & PLAN NOTE
Weights reviewed this week few abnormal reading in between but has been mostly in the range of 118 - 120 lbs  Currently 118 lbs  Continue current meds  Continue monitoring for CHF  Continue monitoring weight

## 2021-08-27 NOTE — PROGRESS NOTES
[facility] notes  SHORT TERM REHAB       NAME: Violet Jordan  AGE: 80 y o  SEX: female    DATE OF ENCOUNTER: 8/27/2021    Assessment and Plan   Intractable back pain  Pain seems to better controlled  Continue current meds  Continues with therapy and seems to be making progress  Reviewed therapy notes    Paroxysmal atrial fibrillation (HCC)  Rate controlled   INR 2 1 will discontinue lovenox  Will give 2 5mg po daily and recheck INR on Monday  (normal dose prior to admission to the hospital seemed to be coumadin 1 25mg MWFSa and all other days 2 5mg)      Chronic diastolic (congestive) heart failure (HCC)  Weights reviewed this week few abnormal reading in between but has been mostly in the range of 118 - 120 lbs  Currently 118 lbs  Continue current meds  Continue monitoring for CHF  Continue monitoring weight              Chief Complaint     Back pain better     History of Present Illness     79 yo female seen for follow up  Patient seen for her back pain, afib and CHF  Reviewed nursing notes since last visit       PMHx     Past Medical History:   Diagnosis Date    Chronic pain     Hyperlipidemia     Hypertension     L1 vertebral fracture (HCC)     Osteoporosis     Paroxysmal A-fib (HCC)     TIA (transient ischemic attack)      Past Surgical History:   Procedure Laterality Date    BACK SURGERY       Family History   Problem Relation Age of Onset    Heart disease Mother      Social History     Socioeconomic History    Marital status: Single     Spouse name: Not on file    Number of children: Not on file    Years of education: Not on file    Highest education level: Not on file   Occupational History    Occupation: pharmacist  retired   Tobacco Use    Smoking status: Former Smoker    Smokeless tobacco: Never Used   Vaping Use    Vaping Use: Never used   Substance and Sexual Activity    Alcohol use: Never    Drug use: Never    Sexual activity: Not on file   Other Topics Concern    Not on file   Social History Narrative    Not on file     Social Determinants of Health     Financial Resource Strain: Low Risk     Difficulty of Paying Living Expenses: Not hard at all   Food Insecurity: Unknown    Worried About [address] in the Last Year: Never true    [employer] Food in the Last Year: Not on file   Transportation Needs: No Transportation Needs    Lack of Transportation (Medical): No    Lack of Transportation (Non-Medical): No   Physical Activity: Insufficiently Active    Days of Exercise per Week: 1 day    Minutes of Exercise per Session: 10 min   Stress: No Stress Concern Present    Feeling of Stress : Not at all   Social Connections:  Moderately Isolated    Frequency of Communication with Friends and Family: More than three times a week    Frequency of Social Gatherings with Friends and Family: More than three times a week    Attends Jewish Services: 1 to 4 times per year    Active Member of [employer] or Organizations: No    Attends Club or Organization Meetings: Never    Marital Status: Never    Intimate Partner Violence: Not At Risk    Fear of Current or Ex-Partner: No    Emotionally Abused: No    Physically Abused: No    Sexually Abused: No     Allergies   Allergen Reactions    Iodine - Food Allergy Shortness Of Breath    Acyclovir     Buprenorphine GI Intolerance and Other (See Comments)    Hydrocodone-Acetaminophen      Vomiting    Hydrocodone-Acetaminophen      Vomiting  Vomiting    Iodinated Diagnostic Agents     Latex     Metrizamide     Naproxen GI Intolerance    Shellfish Allergy - Food Allergy     Glucosamine Rash    Medical Tape Rash    Other Other (See Comments)     red raised areas - please use paper tape    Shellfish-Derived Products - Food Allergy Rash       Review of Systems     Chronic back pain   All other review of system negative        Objective   Vital signs:  BP: 104/66  HR: 84  RR: 20  TEMP: 98 4F  SAT : 97% on RA    PHYSICAL EXAM:  GENERAL: no acute distress, chronically ill appearing  SKIN: warm, dry, no rash, no cyanosis  HEENT: normocephalic, atraumatic, no JVD, no Thyromegaly, no lymphadenopathy  LUNGS: CTA, no wheezing, no rales, expanded equally, no chest tenderness   HEART: normal rhythm, normal rate, no murmur, no gallop  ABDOMEN: soft non tender non distended bs+, no guarding or rebound tenderness  :  no suprapubic tenderness  MUSCULOSKELETAL: strength about 4+/5 all extremities,  no calf tenderness  NEUROLOGY: awake, alert, Ox3, CN2-12 intact  PSYCH: cooperative, pleasant         Pertinent Laboratory/Diagnostic Studies:  Recent labs and diagnostic tests reviewed in nursing home EMR    Current Medications   Medications reviewed

## 2021-08-27 NOTE — ASSESSMENT & PLAN NOTE
Pain seems to better controlled  Continue current meds  Continues with therapy and seems to be making progress  Reviewed therapy notes

## 2021-08-27 NOTE — ASSESSMENT & PLAN NOTE
Rate controlled   INR 2 1 will discontinue lovenox  Will give 2 5mg po daily and recheck INR on Monday  (normal dose prior to admission to the hospital seemed to be coumadin 1 25mg MWFSa and all other days 2 5mg)

## 2021-08-31 ENCOUNTER — TELEPHONE (OUTPATIENT)
Dept: LAB | Facility: HOSPITAL | Age: 86
End: 2021-08-31

## 2021-09-01 ENCOUNTER — TELEPHONE (OUTPATIENT)
Dept: LAB | Facility: HOSPITAL | Age: 86
End: 2021-09-01

## 2021-09-02 ENCOUNTER — ANTICOAG VISIT (OUTPATIENT)
Dept: CARDIOLOGY CLINIC | Facility: CLINIC | Age: 86
End: 2021-09-02

## 2021-09-02 ENCOUNTER — APPOINTMENT (OUTPATIENT)
Dept: LAB | Facility: HOSPITAL | Age: 86
End: 2021-09-02
Attending: INTERNAL MEDICINE
Payer: MEDICARE

## 2021-09-02 DIAGNOSIS — I48.0 PAROXYSMAL ATRIAL FIBRILLATION (HCC): Primary | ICD-10-CM

## 2021-09-03 ENCOUNTER — TELEPHONE (OUTPATIENT)
Dept: LAB | Facility: HOSPITAL | Age: 86
End: 2021-09-03

## 2021-11-01 ENCOUNTER — ANTICOAG VISIT (OUTPATIENT)
Dept: CARDIOLOGY CLINIC | Facility: CLINIC | Age: 86
End: 2021-11-01

## 2021-11-01 DIAGNOSIS — I48.0 PAROXYSMAL ATRIAL FIBRILLATION (HCC): Primary | ICD-10-CM
